# Patient Record
Sex: FEMALE | Race: WHITE | Employment: OTHER | URBAN - METROPOLITAN AREA
[De-identification: names, ages, dates, MRNs, and addresses within clinical notes are randomized per-mention and may not be internally consistent; named-entity substitution may affect disease eponyms.]

---

## 2017-09-26 PROBLEM — R42 DIZZINESS: Status: ACTIVE | Noted: 2017-09-26

## 2018-01-01 ENCOUNTER — APPOINTMENT (OUTPATIENT)
Dept: GENERAL RADIOLOGY | Age: 68
DRG: 871 | End: 2018-01-01
Payer: COMMERCIAL

## 2018-01-01 ENCOUNTER — TELEPHONE (OUTPATIENT)
Dept: INTERNAL MEDICINE CLINIC | Age: 68
End: 2018-01-01

## 2018-01-01 ENCOUNTER — APPOINTMENT (OUTPATIENT)
Dept: GENERAL RADIOLOGY | Age: 68
DRG: 393 | End: 2018-01-01
Payer: COMMERCIAL

## 2018-01-01 ENCOUNTER — OFFICE VISIT (OUTPATIENT)
Dept: INTERNAL MEDICINE CLINIC | Age: 68
End: 2018-01-01
Payer: COMMERCIAL

## 2018-01-01 ENCOUNTER — HOSPITAL ENCOUNTER (OUTPATIENT)
Dept: INFUSION THERAPY | Age: 68
Setting detail: INFUSION SERIES
Discharge: HOME | End: 2018-07-03
Payer: COMMERCIAL

## 2018-01-01 ENCOUNTER — HOSPITAL ENCOUNTER (OUTPATIENT)
Age: 68
Discharge: HOME | End: 2018-08-14
Payer: COMMERCIAL

## 2018-01-01 ENCOUNTER — APPOINTMENT (OUTPATIENT)
Dept: CT IMAGING | Age: 68
DRG: 393 | End: 2018-01-01
Payer: COMMERCIAL

## 2018-01-01 ENCOUNTER — HOSPITAL ENCOUNTER (EMERGENCY)
Age: 68
Discharge: OTHER INSTITUTION | End: 2018-10-21
Attending: EMERGENCY MEDICINE
Payer: COMMERCIAL

## 2018-01-01 ENCOUNTER — HOSPITAL ENCOUNTER (OUTPATIENT)
Age: 68
Discharge: HOME | End: 2018-10-01
Payer: COMMERCIAL

## 2018-01-01 ENCOUNTER — HOSPITAL ENCOUNTER (INPATIENT)
Age: 68
LOS: 3 days | Discharge: HOME HEALTH | DRG: 871 | End: 2018-12-18
Attending: EMERGENCY MEDICINE | Admitting: FAMILY MEDICINE
Payer: COMMERCIAL

## 2018-01-01 ENCOUNTER — APPOINTMENT (OUTPATIENT)
Dept: CT IMAGING | Age: 68
DRG: 871 | End: 2018-01-01
Payer: COMMERCIAL

## 2018-01-01 ENCOUNTER — TELEPHONE (OUTPATIENT)
Dept: FAMILY MEDICINE CLINIC | Age: 68
End: 2018-01-01

## 2018-01-01 ENCOUNTER — APPOINTMENT (OUTPATIENT)
Dept: CT IMAGING | Age: 68
End: 2018-01-01
Payer: COMMERCIAL

## 2018-01-01 ENCOUNTER — TELEPHONE (OUTPATIENT)
Dept: PHYSICAL MEDICINE AND REHAB | Age: 68
End: 2018-01-01

## 2018-01-01 ENCOUNTER — HOSPITAL ENCOUNTER (OUTPATIENT)
Dept: MAMMOGRAPHY | Age: 68
Discharge: HOME | End: 2018-05-27
Payer: COMMERCIAL

## 2018-01-01 ENCOUNTER — HOSPITAL ENCOUNTER (INPATIENT)
Age: 68
LOS: 6 days | Discharge: NURSING, REHAB OR LONG-TERM CARE | DRG: 393 | End: 2018-09-14
Attending: EMERGENCY MEDICINE | Admitting: FAMILY MEDICINE
Payer: COMMERCIAL

## 2018-01-01 ENCOUNTER — HOSPITAL ENCOUNTER (OUTPATIENT)
Age: 68
Discharge: HOME | End: 2018-04-10
Payer: COMMERCIAL

## 2018-01-01 ENCOUNTER — HOSPITAL ENCOUNTER (OUTPATIENT)
Dept: CT IMAGING | Age: 68
Discharge: HOME | End: 2018-03-16
Payer: COMMERCIAL

## 2018-01-01 ENCOUNTER — HOSPITAL ENCOUNTER (EMERGENCY)
Age: 68
Discharge: HOME | End: 2018-05-18
Attending: EMERGENCY MEDICINE
Payer: COMMERCIAL

## 2018-01-01 VITALS
OXYGEN SATURATION: 96 % | TEMPERATURE: 98.9 F | HEART RATE: 96 BPM | WEIGHT: 150 LBS | RESPIRATION RATE: 14 BRPM | BODY MASS INDEX: 24.99 KG/M2 | SYSTOLIC BLOOD PRESSURE: 118 MMHG | DIASTOLIC BLOOD PRESSURE: 61 MMHG | HEIGHT: 65 IN

## 2018-01-01 VITALS
WEIGHT: 144 LBS | OXYGEN SATURATION: 97 % | RESPIRATION RATE: 22 BRPM | DIASTOLIC BLOOD PRESSURE: 82 MMHG | TEMPERATURE: 98.6 F | HEART RATE: 107 BPM | BODY MASS INDEX: 23.99 KG/M2 | SYSTOLIC BLOOD PRESSURE: 124 MMHG | HEIGHT: 65 IN

## 2018-01-01 VITALS
SYSTOLIC BLOOD PRESSURE: 122 MMHG | HEIGHT: 64 IN | BODY MASS INDEX: 26.05 KG/M2 | DIASTOLIC BLOOD PRESSURE: 66 MMHG | WEIGHT: 152.6 LBS | RESPIRATION RATE: 18 BRPM | OXYGEN SATURATION: 98 % | HEART RATE: 114 BPM | TEMPERATURE: 98.2 F

## 2018-01-01 VITALS
OXYGEN SATURATION: 96 % | BODY MASS INDEX: 22.34 KG/M2 | HEART RATE: 119 BPM | TEMPERATURE: 98 F | HEIGHT: 66 IN | DIASTOLIC BLOOD PRESSURE: 67 MMHG | SYSTOLIC BLOOD PRESSURE: 123 MMHG | WEIGHT: 139 LBS

## 2018-01-01 VITALS
TEMPERATURE: 98 F | SYSTOLIC BLOOD PRESSURE: 125 MMHG | HEART RATE: 119 BPM | BODY MASS INDEX: 25.2 KG/M2 | DIASTOLIC BLOOD PRESSURE: 76 MMHG | WEIGHT: 156.8 LBS | OXYGEN SATURATION: 94 % | HEIGHT: 66 IN

## 2018-01-01 VITALS
SYSTOLIC BLOOD PRESSURE: 122 MMHG | DIASTOLIC BLOOD PRESSURE: 63 MMHG | HEART RATE: 98 BPM | RESPIRATION RATE: 100 BRPM | BODY MASS INDEX: 20.57 KG/M2 | TEMPERATURE: 98.9 F | WEIGHT: 128 LBS | HEIGHT: 66 IN

## 2018-01-01 VITALS
OXYGEN SATURATION: 93 % | WEIGHT: 148.15 LBS | BODY MASS INDEX: 24.68 KG/M2 | HEART RATE: 102 BPM | HEIGHT: 65 IN | RESPIRATION RATE: 16 BRPM | DIASTOLIC BLOOD PRESSURE: 66 MMHG | SYSTOLIC BLOOD PRESSURE: 146 MMHG | TEMPERATURE: 98.4 F

## 2018-01-01 VITALS
OXYGEN SATURATION: 92 % | HEIGHT: 66 IN | DIASTOLIC BLOOD PRESSURE: 60 MMHG | RESPIRATION RATE: 18 BRPM | BODY MASS INDEX: 17.57 KG/M2 | TEMPERATURE: 98.7 F | SYSTOLIC BLOOD PRESSURE: 121 MMHG | HEART RATE: 102 BPM | WEIGHT: 109.31 LBS

## 2018-01-01 VITALS
DIASTOLIC BLOOD PRESSURE: 78 MMHG | RESPIRATION RATE: 18 BRPM | OXYGEN SATURATION: 100 % | TEMPERATURE: 98.6 F | HEART RATE: 80 BPM | SYSTOLIC BLOOD PRESSURE: 109 MMHG

## 2018-01-01 DIAGNOSIS — K56.699 COLON STRICTURE (HCC): ICD-10-CM

## 2018-01-01 DIAGNOSIS — M79.89 PAIN AND SWELLING OF RIGHT LOWER LEG: ICD-10-CM

## 2018-01-01 DIAGNOSIS — R63.4 WEIGHT LOSS, UNINTENTIONAL: ICD-10-CM

## 2018-01-01 DIAGNOSIS — J96.11 CHRONIC RESPIRATORY FAILURE WITH HYPOXIA, ON HOME O2 THERAPY (HCC): Primary | ICD-10-CM

## 2018-01-01 DIAGNOSIS — I50.32 CHRONIC DIASTOLIC (CONGESTIVE) HEART FAILURE (HCC): ICD-10-CM

## 2018-01-01 DIAGNOSIS — R11.2 NON-INTRACTABLE VOMITING WITH NAUSEA, UNSPECIFIED VOMITING TYPE: ICD-10-CM

## 2018-01-01 DIAGNOSIS — Z13.820 OSTEOPOROSIS SCREENING: ICD-10-CM

## 2018-01-01 DIAGNOSIS — K55.059 ACUTE INTESTINAL ISCHEMIA (HCC): Primary | ICD-10-CM

## 2018-01-01 DIAGNOSIS — R91.8 LUNG NODULES: Primary | ICD-10-CM

## 2018-01-01 DIAGNOSIS — M79.661 PAIN AND SWELLING OF RIGHT LOWER LEG: ICD-10-CM

## 2018-01-01 DIAGNOSIS — E11.9 DIABETES MELLITUS TYPE 2, NONINSULIN DEPENDENT (HCC): ICD-10-CM

## 2018-01-01 DIAGNOSIS — Z78.9 IMPAIRED MOBILITY AND ACTIVITIES OF DAILY LIVING: ICD-10-CM

## 2018-01-01 DIAGNOSIS — E78.5 HYPERLIPIDEMIA, UNSPECIFIED HYPERLIPIDEMIA TYPE: ICD-10-CM

## 2018-01-01 DIAGNOSIS — R63.0 DECREASED APPETITE: ICD-10-CM

## 2018-01-01 DIAGNOSIS — M85.859 OTHER SPECIFIED DISORDERS OF BONE DENSITY AND STRUCTURE, UNSPECIFIED THIGH: ICD-10-CM

## 2018-01-01 DIAGNOSIS — J42 CHRONIC BRONCHITIS, UNSPECIFIED CHRONIC BRONCHITIS TYPE (HCC): ICD-10-CM

## 2018-01-01 DIAGNOSIS — Z99.81 CHRONIC RESPIRATORY FAILURE WITH HYPOXIA, ON HOME O2 THERAPY (HCC): Primary | ICD-10-CM

## 2018-01-01 DIAGNOSIS — A41.9 SEPSIS, DUE TO UNSPECIFIED ORGANISM: Primary | ICD-10-CM

## 2018-01-01 DIAGNOSIS — D64.9 ANEMIA, UNSPECIFIED TYPE: ICD-10-CM

## 2018-01-01 DIAGNOSIS — Z99.81 CHRONIC RESPIRATORY FAILURE WITH HYPOXIA, ON HOME OXYGEN THERAPY (HCC): Primary | ICD-10-CM

## 2018-01-01 DIAGNOSIS — I99.8 VASCULAR CALCIFICATION: ICD-10-CM

## 2018-01-01 DIAGNOSIS — Z71.89 MEDICATION CARE PLAN DISCUSSED WITH PATIENT: ICD-10-CM

## 2018-01-01 DIAGNOSIS — R11.0 NAUSEA: ICD-10-CM

## 2018-01-01 DIAGNOSIS — G25.81 RESTLESS LEGS SYNDROME: ICD-10-CM

## 2018-01-01 DIAGNOSIS — E87.20 LACTIC ACIDOSIS: ICD-10-CM

## 2018-01-01 DIAGNOSIS — M81.0 AGE-RELATED OSTEOPOROSIS WITHOUT CURRENT PATHOLOGICAL FRACTURE: Primary | ICD-10-CM

## 2018-01-01 DIAGNOSIS — J96.11 CHRONIC RESPIRATORY FAILURE WITH HYPOXIA (HCC): ICD-10-CM

## 2018-01-01 DIAGNOSIS — R13.14 PHARYNGOESOPHAGEAL DYSPHAGIA: Primary | ICD-10-CM

## 2018-01-01 DIAGNOSIS — K62.5 RECTAL BLEEDING: ICD-10-CM

## 2018-01-01 DIAGNOSIS — R60.0 BILATERAL EDEMA OF LOWER EXTREMITY: ICD-10-CM

## 2018-01-01 DIAGNOSIS — K21.9 GASTROESOPHAGEAL REFLUX DISEASE, ESOPHAGITIS PRESENCE NOT SPECIFIED: Primary | Chronic | ICD-10-CM

## 2018-01-01 DIAGNOSIS — Z12.39 BREAST CANCER SCREENING: ICD-10-CM

## 2018-01-01 DIAGNOSIS — E78.2 MIXED HYPERLIPIDEMIA: ICD-10-CM

## 2018-01-01 DIAGNOSIS — I50.32 CHRONIC DIASTOLIC HEART FAILURE (HCC): ICD-10-CM

## 2018-01-01 DIAGNOSIS — R91.8 LUNG NODULES: ICD-10-CM

## 2018-01-01 DIAGNOSIS — M81.0 AGE-RELATED OSTEOPOROSIS WITHOUT CURRENT PATHOLOGICAL FRACTURE: ICD-10-CM

## 2018-01-01 DIAGNOSIS — R10.10 PAIN OF UPPER ABDOMEN: ICD-10-CM

## 2018-01-01 DIAGNOSIS — R91.1 LUNG NODULE: ICD-10-CM

## 2018-01-01 DIAGNOSIS — J42 CHRONIC BRONCHITIS, UNSPECIFIED CHRONIC BRONCHITIS TYPE (HCC): Primary | ICD-10-CM

## 2018-01-01 DIAGNOSIS — R91.8 MULTIPLE LUNG NODULES ON CT: ICD-10-CM

## 2018-01-01 DIAGNOSIS — Z23 NEED FOR PNEUMOCOCCAL VACCINATION: ICD-10-CM

## 2018-01-01 DIAGNOSIS — J96.11 CHRONIC RESPIRATORY FAILURE WITH HYPOXIA, ON HOME OXYGEN THERAPY (HCC): Primary | ICD-10-CM

## 2018-01-01 DIAGNOSIS — F17.218 CIGARETTE NICOTINE DEPENDENCE WITH OTHER NICOTINE-INDUCED DISORDER: ICD-10-CM

## 2018-01-01 DIAGNOSIS — J44.9 CHRONIC OBSTRUCTIVE PULMONARY DISEASE, UNSPECIFIED COPD TYPE (HCC): ICD-10-CM

## 2018-01-01 DIAGNOSIS — R34 OLIGOURIA: ICD-10-CM

## 2018-01-01 DIAGNOSIS — M85.859 OSTEOPENIA OF HIP: ICD-10-CM

## 2018-01-01 DIAGNOSIS — R19.7 DIARRHEA, UNSPECIFIED TYPE: ICD-10-CM

## 2018-01-01 DIAGNOSIS — Z09 HOSPITAL DISCHARGE FOLLOW-UP: Primary | ICD-10-CM

## 2018-01-01 DIAGNOSIS — I10 ESSENTIAL HYPERTENSION: ICD-10-CM

## 2018-01-01 DIAGNOSIS — J44.9 COPD WITH HYPOXIA (HCC): ICD-10-CM

## 2018-01-01 DIAGNOSIS — D50.9 IRON DEFICIENCY ANEMIA, UNSPECIFIED IRON DEFICIENCY ANEMIA TYPE: ICD-10-CM

## 2018-01-01 DIAGNOSIS — R10.13 EPIGASTRIC PAIN: Primary | ICD-10-CM

## 2018-01-01 DIAGNOSIS — J84.9 INTERSTITIAL LUNG DISEASE (HCC): ICD-10-CM

## 2018-01-01 DIAGNOSIS — J90 PLEURAL EFFUSION: Primary | ICD-10-CM

## 2018-01-01 DIAGNOSIS — Z74.09 IMPAIRED MOBILITY AND ACTIVITIES OF DAILY LIVING: ICD-10-CM

## 2018-01-01 DIAGNOSIS — J18.9 PNEUMONIA OF BOTH LOWER LOBES DUE TO INFECTIOUS ORGANISM: ICD-10-CM

## 2018-01-01 DIAGNOSIS — R09.02 COPD WITH HYPOXIA (HCC): ICD-10-CM

## 2018-01-01 DIAGNOSIS — R10.32 LEFT LOWER QUADRANT PAIN: ICD-10-CM

## 2018-01-01 DIAGNOSIS — D72.829 LEUKOCYTOSIS, UNSPECIFIED TYPE: ICD-10-CM

## 2018-01-01 DIAGNOSIS — J84.89 INTERSTITIAL PNEUMONITIS (HCC): ICD-10-CM

## 2018-01-01 DIAGNOSIS — K21.9 GASTROESOPHAGEAL REFLUX DISEASE, ESOPHAGITIS PRESENCE NOT SPECIFIED: ICD-10-CM

## 2018-01-01 DIAGNOSIS — Z87.19 HISTORY OF ISCHEMIC COLITIS: ICD-10-CM

## 2018-01-01 DIAGNOSIS — E78.1 HYPERTRIGLYCERIDEMIA: ICD-10-CM

## 2018-01-01 DIAGNOSIS — E53.8 FOLATE DEFICIENCY: ICD-10-CM

## 2018-01-01 DIAGNOSIS — Z71.2 ENCOUNTER TO DISCUSS TEST RESULTS: ICD-10-CM

## 2018-01-01 DIAGNOSIS — E11.49 OTHER DIABETIC NEUROLOGICAL COMPLICATION ASSOCIATED WITH TYPE 2 DIABETES MELLITUS (HCC): ICD-10-CM

## 2018-01-01 DIAGNOSIS — F32.A DEPRESSION, UNSPECIFIED DEPRESSION TYPE: Chronic | ICD-10-CM

## 2018-01-01 DIAGNOSIS — K55.069 OCCLUSION OF SUPERIOR MESENTERIC ARTERY (HCC): Primary | ICD-10-CM

## 2018-01-01 DIAGNOSIS — E55.9 VITAMIN D DEFICIENCY: ICD-10-CM

## 2018-01-01 DIAGNOSIS — R26.2 DIFFICULTY WALKING: ICD-10-CM

## 2018-01-01 LAB
ABO/RH: NORMAL
ALBUMIN SERPL-MCNC: 1.8 G/DL (ref 3.5–5.2)
ALBUMIN SERPL-MCNC: 2 G/DL (ref 3.5–5.2)
ALBUMIN SERPL-MCNC: 2.1 G/DL (ref 3.5–5.2)
ALBUMIN SERPL-MCNC: 2.3 G/DL (ref 3.5–5.2)
ALBUMIN SERPL-MCNC: 2.4 G/DL (ref 3.5–5.2)
ALBUMIN SERPL-MCNC: 2.5 G/DL (ref 3.5–5.2)
ALBUMIN SERPL-MCNC: 2.5 G/DL (ref 3.5–5.2)
ALBUMIN SERPL-MCNC: 2.6 G/DL (ref 3.5–5.2)
ALBUMIN SERPL-MCNC: 2.7 G/DL (ref 3.5–5.2)
ALBUMIN SERPL-MCNC: 2.7 G/DL (ref 3.5–5.2)
ALBUMIN SERPL-MCNC: 3.4 G/DL (ref 3.5–5.2)
ALBUMIN SERPL-MCNC: 3.4 G/DL (ref 3.5–5.2)
ALBUMIN SERPL-MCNC: 3.6 G/DL (ref 3.5–5.2)
ALBUMIN SERPL-MCNC: 3.6 G/DL (ref 3.5–5.2)
ALBUMIN SERPL-MCNC: 3.8 G/DL (ref 3.5–5.2)
ALP BLD-CCNC: 105 U/L (ref 35–104)
ALP BLD-CCNC: 111 U/L (ref 35–104)
ALP BLD-CCNC: 111 U/L (ref 35–104)
ALP BLD-CCNC: 112 U/L (ref 35–104)
ALP BLD-CCNC: 112 U/L (ref 35–104)
ALP BLD-CCNC: 116 U/L (ref 35–104)
ALP BLD-CCNC: 122 U/L (ref 35–104)
ALP BLD-CCNC: 77 U/L (ref 35–104)
ALP BLD-CCNC: 83 U/L (ref 35–104)
ALP BLD-CCNC: 83 U/L (ref 35–104)
ALP BLD-CCNC: 84 U/L (ref 35–104)
ALP BLD-CCNC: 84 U/L (ref 35–104)
ALP BLD-CCNC: 89 U/L (ref 35–104)
ALP BLD-CCNC: 91 U/L (ref 35–104)
ALP BLD-CCNC: 98 U/L (ref 35–104)
ALT SERPL-CCNC: 10 U/L (ref 0–32)
ALT SERPL-CCNC: 14 U/L (ref 0–32)
ALT SERPL-CCNC: 15 U/L (ref 0–32)
ALT SERPL-CCNC: 15 U/L (ref 0–32)
ALT SERPL-CCNC: 16 U/L (ref 0–32)
ALT SERPL-CCNC: 16 U/L (ref 0–32)
ALT SERPL-CCNC: 5 U/L (ref 0–32)
ALT SERPL-CCNC: 6 U/L (ref 0–32)
ALT SERPL-CCNC: 7 U/L (ref 0–32)
ALT SERPL-CCNC: 8 U/L (ref 0–32)
ALT SERPL-CCNC: 9 U/L (ref 0–32)
ALT SERPL-CCNC: 9 U/L (ref 0–32)
ANION GAP SERPL CALCULATED.3IONS-SCNC: 10 MMOL/L (ref 7–16)
ANION GAP SERPL CALCULATED.3IONS-SCNC: 11 MMOL/L (ref 7–16)
ANION GAP SERPL CALCULATED.3IONS-SCNC: 12 MMOL/L (ref 7–16)
ANION GAP SERPL CALCULATED.3IONS-SCNC: 12 MMOL/L (ref 7–16)
ANION GAP SERPL CALCULATED.3IONS-SCNC: 13 MMOL/L (ref 7–16)
ANION GAP SERPL CALCULATED.3IONS-SCNC: 14 MMOL/L (ref 7–16)
ANION GAP SERPL CALCULATED.3IONS-SCNC: 16 MMOL/L (ref 7–16)
ANION GAP SERPL CALCULATED.3IONS-SCNC: 9 MMOL/L (ref 7–16)
ANISOCYTOSIS: ABNORMAL
ANISOCYTOSIS: ABNORMAL
ANTIBODY SCREEN: NORMAL
AST SERPL-CCNC: 10 U/L (ref 0–31)
AST SERPL-CCNC: 11 U/L (ref 0–31)
AST SERPL-CCNC: 12 U/L (ref 0–31)
AST SERPL-CCNC: 14 U/L (ref 0–31)
AST SERPL-CCNC: 14 U/L (ref 0–31)
AST SERPL-CCNC: 15 U/L (ref 0–31)
AST SERPL-CCNC: 15 U/L (ref 0–31)
AST SERPL-CCNC: 18 U/L (ref 0–31)
AST SERPL-CCNC: 18 U/L (ref 0–31)
AST SERPL-CCNC: 19 U/L (ref 0–31)
AST SERPL-CCNC: 8 U/L (ref 0–31)
AST SERPL-CCNC: 8 U/L (ref 0–31)
B.E.: 4.4 MMOL/L (ref -3–3)
BACTERIA: ABNORMAL /HPF
BACTERIA: NORMAL /HPF
BASOPHILS ABSOLUTE: 0 E9/L (ref 0–0.2)
BASOPHILS ABSOLUTE: 0.02 E9/L (ref 0–0.2)
BASOPHILS ABSOLUTE: 0.03 E9/L (ref 0–0.2)
BASOPHILS ABSOLUTE: 0.04 E9/L (ref 0–0.2)
BASOPHILS ABSOLUTE: 0.05 E9/L (ref 0–0.2)
BASOPHILS ABSOLUTE: 0.05 E9/L (ref 0–0.2)
BASOPHILS RELATIVE PERCENT: 0.1 % (ref 0–2)
BASOPHILS RELATIVE PERCENT: 0.1 % (ref 0–2)
BASOPHILS RELATIVE PERCENT: 0.2 % (ref 0–2)
BASOPHILS RELATIVE PERCENT: 0.3 % (ref 0–2)
BASOPHILS RELATIVE PERCENT: 0.4 % (ref 0–2)
BASOPHILS RELATIVE PERCENT: 0.4 % (ref 0–2)
BASOPHILS RELATIVE PERCENT: 0.5 % (ref 0–2)
BASOPHILS RELATIVE PERCENT: 0.5 % (ref 0–2)
BILIRUB SERPL-MCNC: 0.2 MG/DL (ref 0–1.2)
BILIRUB SERPL-MCNC: 0.3 MG/DL (ref 0–1.2)
BILIRUB SERPL-MCNC: 0.4 MG/DL (ref 0–1.2)
BILIRUB SERPL-MCNC: <0.2 MG/DL (ref 0–1.2)
BILIRUBIN URINE: NEGATIVE
BLOOD BANK DISPENSE STATUS: NORMAL
BLOOD BANK DISPENSE STATUS: NORMAL
BLOOD BANK PRODUCT CODE: NORMAL
BLOOD BANK PRODUCT CODE: NORMAL
BLOOD CULTURE, ROUTINE: NORMAL
BLOOD, URINE: NEGATIVE
BPU ID: NORMAL
BPU ID: NORMAL
BUN BLDV-MCNC: 10 MG/DL (ref 8–23)
BUN BLDV-MCNC: 10 MG/DL (ref 8–23)
BUN BLDV-MCNC: 11 MG/DL (ref 8–23)
BUN BLDV-MCNC: 11 MG/DL (ref 8–23)
BUN BLDV-MCNC: 12 MG/DL (ref 8–23)
BUN BLDV-MCNC: 14 MG/DL (ref 8–23)
BUN BLDV-MCNC: 14 MG/DL (ref 8–23)
BUN BLDV-MCNC: 20 MG/DL (ref 8–23)
BUN BLDV-MCNC: 3 MG/DL (ref 8–23)
BUN BLDV-MCNC: 5 MG/DL (ref 8–23)
BUN BLDV-MCNC: 6 MG/DL (ref 8–23)
BUN BLDV-MCNC: 6 MG/DL (ref 8–23)
BUN BLDV-MCNC: 7 MG/DL (ref 8–23)
BUN BLDV-MCNC: 7 MG/DL (ref 8–23)
BUN BLDV-MCNC: 8 MG/DL (ref 8–23)
BURR CELLS: ABNORMAL
C DIFFICILE TOXIN, EIA: NORMAL
CALCIUM SERPL-MCNC: 7.3 MG/DL (ref 8.6–10.2)
CALCIUM SERPL-MCNC: 7.4 MG/DL (ref 8.6–10.2)
CALCIUM SERPL-MCNC: 7.5 MG/DL (ref 8.6–10.2)
CALCIUM SERPL-MCNC: 7.6 MG/DL (ref 8.6–10.2)
CALCIUM SERPL-MCNC: 7.7 MG/DL (ref 8.6–10.2)
CALCIUM SERPL-MCNC: 7.7 MG/DL (ref 8.6–10.2)
CALCIUM SERPL-MCNC: 7.8 MG/DL (ref 8.6–10.2)
CALCIUM SERPL-MCNC: 7.8 MG/DL (ref 8.6–10.2)
CALCIUM SERPL-MCNC: 8.2 MG/DL (ref 8.6–10.2)
CALCIUM SERPL-MCNC: 8.3 MG/DL (ref 8.6–10.2)
CALCIUM SERPL-MCNC: 8.4 MG/DL (ref 8.6–10.2)
CALCIUM SERPL-MCNC: 8.4 MG/DL (ref 8.6–10.2)
CALCIUM SERPL-MCNC: 8.7 MG/DL (ref 8.6–10.2)
CALCIUM SERPL-MCNC: 9 MG/DL (ref 8.6–10.2)
CALCIUM SERPL-MCNC: 9.1 MG/DL (ref 8.6–10.2)
CALCIUM SERPL-MCNC: 9.3 MG/DL (ref 8.6–10.2)
CHLORIDE BLD-SCNC: 100 MMOL/L (ref 98–107)
CHLORIDE BLD-SCNC: 100 MMOL/L (ref 98–107)
CHLORIDE BLD-SCNC: 101 MMOL/L (ref 98–107)
CHLORIDE BLD-SCNC: 106 MMOL/L (ref 98–107)
CHLORIDE BLD-SCNC: 109 MMOL/L (ref 98–107)
CHLORIDE BLD-SCNC: 109 MMOL/L (ref 98–107)
CHLORIDE BLD-SCNC: 92 MMOL/L (ref 98–107)
CHLORIDE BLD-SCNC: 95 MMOL/L (ref 98–107)
CHLORIDE BLD-SCNC: 96 MMOL/L (ref 98–107)
CHLORIDE BLD-SCNC: 97 MMOL/L (ref 98–107)
CHLORIDE BLD-SCNC: 99 MMOL/L (ref 98–107)
CHOLESTEROL, TOTAL: 185 MG/DL (ref 0–199)
CHOLESTEROL, TOTAL: 92 MG/DL (ref 0–199)
CHP ED QC CHECK: NORMAL
CLARITY: ABNORMAL
CLARITY: CLEAR
CO2: 18 MMOL/L (ref 22–29)
CO2: 19 MMOL/L (ref 22–29)
CO2: 20 MMOL/L (ref 22–29)
CO2: 21 MMOL/L (ref 22–29)
CO2: 22 MMOL/L (ref 22–29)
CO2: 23 MMOL/L (ref 22–29)
CO2: 24 MMOL/L (ref 22–29)
CO2: 25 MMOL/L (ref 22–29)
CO2: 26 MMOL/L (ref 22–29)
CO2: 27 MMOL/L (ref 22–29)
CO2: 28 MMOL/L (ref 22–29)
CO2: 29 MMOL/L (ref 22–29)
CO2: 29 MMOL/L (ref 22–29)
CO2: 30 MMOL/L (ref 22–29)
CO2: 31 MMOL/L (ref 22–29)
COHB: 0.7 % (ref 0–1.5)
COLOR: YELLOW
CREAT SERPL-MCNC: 0.4 MG/DL (ref 0.5–1)
CREAT SERPL-MCNC: 0.5 MG/DL (ref 0.5–1)
CREAT SERPL-MCNC: 0.6 MG/DL (ref 0.5–1)
CREAT SERPL-MCNC: 0.7 MG/DL (ref 0.5–1)
CREAT SERPL-MCNC: 0.8 MG/DL (ref 0.5–1)
CRITICAL: ABNORMAL
CULTURE, BLOOD 2: NORMAL
CULTURE, RESPIRATORY: ABNORMAL
CULTURE, RESPIRATORY: ABNORMAL
CULTURE, STOOL: NORMAL
DATE ANALYZED: ABNORMAL
DATE OF COLLECTION: ABNORMAL
DESCRIPTION BLOOD BANK: NORMAL
DESCRIPTION BLOOD BANK: NORMAL
EKG ATRIAL RATE: 105 BPM
EKG ATRIAL RATE: 110 BPM
EKG ATRIAL RATE: 129 BPM
EKG ATRIAL RATE: 95 BPM
EKG P AXIS: 50 DEGREES
EKG P AXIS: 51 DEGREES
EKG P AXIS: 59 DEGREES
EKG P AXIS: 65 DEGREES
EKG P-R INTERVAL: 136 MS
EKG P-R INTERVAL: 140 MS
EKG P-R INTERVAL: 146 MS
EKG P-R INTERVAL: 154 MS
EKG Q-T INTERVAL: 318 MS
EKG Q-T INTERVAL: 354 MS
EKG Q-T INTERVAL: 376 MS
EKG Q-T INTERVAL: 406 MS
EKG QRS DURATION: 124 MS
EKG QRS DURATION: 126 MS
EKG QRS DURATION: 132 MS
EKG QRS DURATION: 134 MS
EKG QTC CALCULATION (BAZETT): 465 MS
EKG QTC CALCULATION (BAZETT): 467 MS
EKG QTC CALCULATION (BAZETT): 508 MS
EKG QTC CALCULATION (BAZETT): 510 MS
EKG R AXIS: 19 DEGREES
EKG R AXIS: 25 DEGREES
EKG R AXIS: 28 DEGREES
EKG R AXIS: 52 DEGREES
EKG T AXIS: 120 DEGREES
EKG T AXIS: 29 DEGREES
EKG T AXIS: 32 DEGREES
EKG T AXIS: 5 DEGREES
EKG VENTRICULAR RATE: 105 BPM
EKG VENTRICULAR RATE: 110 BPM
EKG VENTRICULAR RATE: 129 BPM
EKG VENTRICULAR RATE: 95 BPM
EOSINOPHILS ABSOLUTE: 0 E9/L (ref 0.05–0.5)
EOSINOPHILS ABSOLUTE: 0.03 E9/L (ref 0.05–0.5)
EOSINOPHILS ABSOLUTE: 0.04 E9/L (ref 0.05–0.5)
EOSINOPHILS ABSOLUTE: 0.11 E9/L (ref 0.05–0.5)
EOSINOPHILS ABSOLUTE: 0.12 E9/L (ref 0.05–0.5)
EOSINOPHILS ABSOLUTE: 0.12 E9/L (ref 0.05–0.5)
EOSINOPHILS ABSOLUTE: 0.15 E9/L (ref 0.05–0.5)
EOSINOPHILS ABSOLUTE: 0.2 E9/L (ref 0.05–0.5)
EOSINOPHILS ABSOLUTE: 0.38 E9/L (ref 0.05–0.5)
EOSINOPHILS RELATIVE PERCENT: 0 % (ref 0–6)
EOSINOPHILS RELATIVE PERCENT: 0.2 % (ref 0–6)
EOSINOPHILS RELATIVE PERCENT: 0.2 % (ref 0–6)
EOSINOPHILS RELATIVE PERCENT: 0.3 % (ref 0–6)
EOSINOPHILS RELATIVE PERCENT: 0.9 % (ref 0–6)
EOSINOPHILS RELATIVE PERCENT: 1.1 % (ref 0–6)
EOSINOPHILS RELATIVE PERCENT: 2.3 % (ref 0–6)
EOSINOPHILS RELATIVE PERCENT: 2.4 % (ref 0–6)
EPITHELIAL CELLS, UA: ABNORMAL /HPF
EPITHELIAL CELLS, UA: NORMAL /HPF
FERRITIN: 1 NG/ML
FERRITIN: 14 NG/ML
FILM ARRAY ADENOVIRUS: NORMAL
FILM ARRAY BORDETELLA PERTUSSIS: NORMAL
FILM ARRAY CHLAMYDOPHILIA PNEUMONIAE: NORMAL
FILM ARRAY CORONAVIRUS 229E: NORMAL
FILM ARRAY CORONAVIRUS HKU1: NORMAL
FILM ARRAY CORONAVIRUS NL63: NORMAL
FILM ARRAY CORONAVIRUS OC43: NORMAL
FILM ARRAY INFLUENZA A VIRUS 09H1: NORMAL
FILM ARRAY INFLUENZA A VIRUS H1: NORMAL
FILM ARRAY INFLUENZA A VIRUS H3: NORMAL
FILM ARRAY INFLUENZA A VIRUS: NORMAL
FILM ARRAY INFLUENZA B: NORMAL
FILM ARRAY METAPNEUMOVIRUS: NORMAL
FILM ARRAY MYCOPLASMA PNEUMONIAE: NORMAL
FILM ARRAY PARAINFLUENZA VIRUS 1: NORMAL
FILM ARRAY PARAINFLUENZA VIRUS 2: NORMAL
FILM ARRAY PARAINFLUENZA VIRUS 3: NORMAL
FILM ARRAY PARAINFLUENZA VIRUS 4: NORMAL
FILM ARRAY RESPIRATORY SYNCITIAL VIRUS: NORMAL
FILM ARRAY RHINOVIRUS/ENTEROVIRUS: NORMAL
FOLATE: >20 NG/ML (ref 4.8–24.2)
FOLATE: >20 NG/ML (ref 4.8–24.2)
GFR AFRICAN AMERICAN: >60
GFR NON-AFRICAN AMERICAN: >60 ML/MIN/1.73
GIARDIA ANTIGEN STOOL: NORMAL
GLUCOSE BLD-MCNC: 134 MG/DL (ref 74–109)
GLUCOSE BLD-MCNC: 135 MG/DL
GLUCOSE BLD-MCNC: 137 MG/DL (ref 74–109)
GLUCOSE BLD-MCNC: 146 MG/DL (ref 74–99)
GLUCOSE BLD-MCNC: 148 MG/DL (ref 74–109)
GLUCOSE BLD-MCNC: 151 MG/DL (ref 74–109)
GLUCOSE BLD-MCNC: 156 MG/DL (ref 74–109)
GLUCOSE BLD-MCNC: 185 MG/DL (ref 74–109)
GLUCOSE BLD-MCNC: 195 MG/DL (ref 74–109)
GLUCOSE BLD-MCNC: 214 MG/DL (ref 74–109)
GLUCOSE BLD-MCNC: 235 MG/DL (ref 74–109)
GLUCOSE BLD-MCNC: 240 MG/DL (ref 74–109)
GLUCOSE BLD-MCNC: 244 MG/DL (ref 74–109)
GLUCOSE BLD-MCNC: 71 MG/DL (ref 74–99)
GLUCOSE BLD-MCNC: 80 MG/DL (ref 74–99)
GLUCOSE BLD-MCNC: 91 MG/DL (ref 74–99)
GLUCOSE FASTING: 120 MG/DL (ref 74–109)
GLUCOSE FASTING: 167 MG/DL (ref 74–109)
GLUCOSE URINE: NEGATIVE MG/DL
HBA1C MFR BLD: 5.4 % (ref 4–5.6)
HBA1C MFR BLD: 6.2 % (ref 4–5.6)
HBA1C MFR BLD: 6.2 % (ref 4–5.6)
HBA1C MFR BLD: 6.9 %
HCO3: 28.4 MMOL/L (ref 22–26)
HCT VFR BLD CALC: 23.4 % (ref 34–48)
HCT VFR BLD CALC: 24.4 % (ref 34–48)
HCT VFR BLD CALC: 24.5 % (ref 34–48)
HCT VFR BLD CALC: 24.6 % (ref 34–48)
HCT VFR BLD CALC: 25.3 % (ref 34–48)
HCT VFR BLD CALC: 25.4 % (ref 34–48)
HCT VFR BLD CALC: 25.9 % (ref 34–48)
HCT VFR BLD CALC: 26.5 % (ref 34–48)
HCT VFR BLD CALC: 26.7 % (ref 34–48)
HCT VFR BLD CALC: 27 % (ref 34–48)
HCT VFR BLD CALC: 28.6 % (ref 34–48)
HCT VFR BLD CALC: 29.5 % (ref 34–48)
HCT VFR BLD CALC: 29.8 % (ref 34–48)
HCT VFR BLD CALC: 30.3 % (ref 34–48)
HCT VFR BLD CALC: 30.5 % (ref 34–48)
HCT VFR BLD CALC: 31.7 % (ref 34–48)
HCT VFR BLD CALC: 32 % (ref 34–48)
HCT VFR BLD CALC: 34.3 % (ref 34–48)
HDLC SERPL-MCNC: 39 MG/DL
HDLC SERPL-MCNC: 46 MG/DL
HEMOGLOBIN: 10.1 G/DL (ref 11.5–15.5)
HEMOGLOBIN: 10.5 G/DL (ref 11.5–15.5)
HEMOGLOBIN: 11.3 G/DL (ref 11.5–15.5)
HEMOGLOBIN: 7.3 G/DL (ref 11.5–15.5)
HEMOGLOBIN: 7.6 G/DL (ref 11.5–15.5)
HEMOGLOBIN: 7.8 G/DL (ref 11.5–15.5)
HEMOGLOBIN: 7.8 G/DL (ref 11.5–15.5)
HEMOGLOBIN: 7.9 G/DL (ref 11.5–15.5)
HEMOGLOBIN: 8 G/DL (ref 11.5–15.5)
HEMOGLOBIN: 8.1 G/DL (ref 11.5–15.5)
HEMOGLOBIN: 8.4 G/DL (ref 11.5–15.5)
HEMOGLOBIN: 8.8 G/DL (ref 11.5–15.5)
HEMOGLOBIN: 9 G/DL (ref 11.5–15.5)
HEMOGLOBIN: 9.2 G/DL (ref 11.5–15.5)
HEMOGLOBIN: 9.3 G/DL (ref 11.5–15.5)
HEMOGLOBIN: 9.3 G/DL (ref 11.5–15.5)
HEMOGLOBIN: 9.4 G/DL (ref 11.5–15.5)
HHB: 4.2 % (ref 0–5)
HYPOCHROMIA: ABNORMAL
IMMATURE GRANULOCYTES #: 0.07 E9/L
IMMATURE GRANULOCYTES #: 0.08 E9/L
IMMATURE GRANULOCYTES #: 0.1 E9/L
IMMATURE GRANULOCYTES #: 0.1 E9/L
IMMATURE GRANULOCYTES #: 0.11 E9/L
IMMATURE GRANULOCYTES #: 0.11 E9/L
IMMATURE GRANULOCYTES #: 0.21 E9/L
IMMATURE GRANULOCYTES #: 0.22 E9/L
IMMATURE GRANULOCYTES %: 0.5 % (ref 0–5)
IMMATURE GRANULOCYTES %: 0.7 % (ref 0–5)
IMMATURE GRANULOCYTES %: 0.8 % (ref 0–5)
IMMATURE GRANULOCYTES %: 1 % (ref 0–5)
IMMATURE GRANULOCYTES %: 1 % (ref 0–5)
IMMATURE GRANULOCYTES %: 1.1 % (ref 0–5)
IMMATURE GRANULOCYTES %: 1.4 % (ref 0–5)
IMMATURE GRANULOCYTES %: 1.6 % (ref 0–5)
IMMATURE RETIC FRACT: 32 % (ref 3–15.9)
IRON SATURATION: 4 % (ref 15–50)
IRON SATURATION: 6 % (ref 15–50)
IRON: 12 MCG/DL (ref 37–145)
IRON: 26 MCG/DL (ref 37–145)
KETONES, URINE: NEGATIVE MG/DL
LAB: ABNORMAL
LACTATE DEHYDROGENASE: 141 U/L (ref 135–214)
LACTATE DEHYDROGENASE: 207 U/L (ref 135–214)
LACTIC ACID: 1.2 MMOL/L (ref 0.5–2.2)
LACTIC ACID: 1.3 MMOL/L (ref 0.5–2.2)
LACTIC ACID: 1.9 MMOL/L (ref 0.5–2.2)
LACTIC ACID: 2.1 MMOL/L (ref 0.5–2.2)
LACTIC ACID: 2.5 MMOL/L (ref 0.5–2.2)
LACTIC ACID: 2.6 MMOL/L (ref 0.5–2.2)
LACTIC ACID: 3.6 MMOL/L (ref 0.5–2.2)
LACTIC ACID: 4.1 MMOL/L (ref 0.5–2.2)
LDL CHOLESTEROL CALCULATED: 26 MG/DL (ref 0–99)
LDL CHOLESTEROL CALCULATED: 97 MG/DL (ref 0–99)
LEUKOCYTE ESTERASE, URINE: ABNORMAL
LEUKOCYTE ESTERASE, URINE: NEGATIVE
LIPASE: 17 U/L (ref 13–60)
LIPASE: 19 U/L (ref 13–60)
LIPASE: 26 U/L (ref 13–60)
LIPASE: 32 U/L (ref 13–60)
LV EF: 73 %
LVEF MODALITY: NORMAL
LYMPHOCYTES ABSOLUTE: 0.29 E9/L (ref 1.5–4)
LYMPHOCYTES ABSOLUTE: 0.47 E9/L (ref 1.5–4)
LYMPHOCYTES ABSOLUTE: 0.7 E9/L (ref 1.5–4)
LYMPHOCYTES ABSOLUTE: 1.18 E9/L (ref 1.5–4)
LYMPHOCYTES ABSOLUTE: 1.27 E9/L (ref 1.5–4)
LYMPHOCYTES ABSOLUTE: 1.39 E9/L (ref 1.5–4)
LYMPHOCYTES ABSOLUTE: 1.49 E9/L (ref 1.5–4)
LYMPHOCYTES ABSOLUTE: 1.67 E9/L (ref 1.5–4)
LYMPHOCYTES ABSOLUTE: 1.71 E9/L (ref 1.5–4)
LYMPHOCYTES ABSOLUTE: 2.05 E9/L (ref 1.5–4)
LYMPHOCYTES ABSOLUTE: 2.3 E9/L (ref 1.5–4)
LYMPHOCYTES ABSOLUTE: 2.37 E9/L (ref 1.5–4)
LYMPHOCYTES RELATIVE PERCENT: 0.9 % (ref 20–42)
LYMPHOCYTES RELATIVE PERCENT: 1.8 % (ref 20–42)
LYMPHOCYTES RELATIVE PERCENT: 14.2 % (ref 20–42)
LYMPHOCYTES RELATIVE PERCENT: 15 % (ref 20–42)
LYMPHOCYTES RELATIVE PERCENT: 15.8 % (ref 20–42)
LYMPHOCYTES RELATIVE PERCENT: 17.8 % (ref 20–42)
LYMPHOCYTES RELATIVE PERCENT: 20.9 % (ref 20–42)
LYMPHOCYTES RELATIVE PERCENT: 23.3 % (ref 20–42)
LYMPHOCYTES RELATIVE PERCENT: 3.3 % (ref 20–42)
LYMPHOCYTES RELATIVE PERCENT: 4.3 % (ref 20–42)
LYMPHOCYTES RELATIVE PERCENT: 4.3 % (ref 20–42)
LYMPHOCYTES RELATIVE PERCENT: 8.2 % (ref 20–42)
Lab: ABNORMAL
MAGNESIUM: 0.9 MG/DL (ref 1.6–2.6)
MAGNESIUM: 1.2 MG/DL (ref 1.6–2.6)
MAGNESIUM: 1.7 MG/DL (ref 1.6–2.6)
MAGNESIUM: 1.9 MG/DL (ref 1.6–2.6)
MCH RBC QN AUTO: 25 PG (ref 26–35)
MCH RBC QN AUTO: 25.1 PG (ref 26–35)
MCH RBC QN AUTO: 25.2 PG (ref 26–35)
MCH RBC QN AUTO: 25.5 PG (ref 26–35)
MCH RBC QN AUTO: 25.6 PG (ref 26–35)
MCH RBC QN AUTO: 25.8 PG (ref 26–35)
MCH RBC QN AUTO: 25.9 PG (ref 26–35)
MCH RBC QN AUTO: 26.1 PG (ref 26–35)
MCH RBC QN AUTO: 26.2 PG (ref 26–35)
MCH RBC QN AUTO: 26.5 PG (ref 26–35)
MCH RBC QN AUTO: 27.2 PG (ref 26–35)
MCH RBC QN AUTO: 28.3 PG (ref 26–35)
MCH RBC QN AUTO: 28.8 PG (ref 26–35)
MCH RBC QN AUTO: 28.9 PG (ref 26–35)
MCH RBC QN AUTO: 29 PG (ref 26–35)
MCH RBC QN AUTO: 29.1 PG (ref 26–35)
MCH RBC QN AUTO: 29.5 PG (ref 26–35)
MCH RBC QN AUTO: 30.4 PG (ref 26–35)
MCHC RBC AUTO-ENTMCNC: 30.5 % (ref 32–34.5)
MCHC RBC AUTO-ENTMCNC: 30.7 % (ref 32–34.5)
MCHC RBC AUTO-ENTMCNC: 30.8 % (ref 32–34.5)
MCHC RBC AUTO-ENTMCNC: 30.9 % (ref 32–34.5)
MCHC RBC AUTO-ENTMCNC: 31.1 % (ref 32–34.5)
MCHC RBC AUTO-ENTMCNC: 31.2 % (ref 32–34.5)
MCHC RBC AUTO-ENTMCNC: 31.2 % (ref 32–34.5)
MCHC RBC AUTO-ENTMCNC: 31.5 % (ref 32–34.5)
MCHC RBC AUTO-ENTMCNC: 31.6 % (ref 32–34.5)
MCHC RBC AUTO-ENTMCNC: 31.7 % (ref 32–34.5)
MCHC RBC AUTO-ENTMCNC: 31.9 % (ref 32–34.5)
MCHC RBC AUTO-ENTMCNC: 32.2 % (ref 32–34.5)
MCHC RBC AUTO-ENTMCNC: 32.9 % (ref 32–34.5)
MCHC RBC AUTO-ENTMCNC: 33.1 % (ref 32–34.5)
MCV RBC AUTO: 80.4 FL (ref 80–99.9)
MCV RBC AUTO: 80.4 FL (ref 80–99.9)
MCV RBC AUTO: 80.7 FL (ref 80–99.9)
MCV RBC AUTO: 80.9 FL (ref 80–99.9)
MCV RBC AUTO: 81.1 FL (ref 80–99.9)
MCV RBC AUTO: 81.8 FL (ref 80–99.9)
MCV RBC AUTO: 83 FL (ref 80–99.9)
MCV RBC AUTO: 83.1 FL (ref 80–99.9)
MCV RBC AUTO: 84 FL (ref 80–99.9)
MCV RBC AUTO: 84.2 FL (ref 80–99.9)
MCV RBC AUTO: 86.1 FL (ref 80–99.9)
MCV RBC AUTO: 89.6 FL (ref 80–99.9)
MCV RBC AUTO: 90.7 FL (ref 80–99.9)
MCV RBC AUTO: 91.4 FL (ref 80–99.9)
MCV RBC AUTO: 91.4 FL (ref 80–99.9)
MCV RBC AUTO: 91.9 FL (ref 80–99.9)
MCV RBC AUTO: 92.4 FL (ref 80–99.9)
MCV RBC AUTO: 93.2 FL (ref 80–99.9)
METER GLUCOSE: 106 MG/DL (ref 74–99)
METER GLUCOSE: 106 MG/DL (ref 74–99)
METER GLUCOSE: 113 MG/DL (ref 74–99)
METER GLUCOSE: 117 MG/DL (ref 74–99)
METER GLUCOSE: 119 MG/DL (ref 70–110)
METER GLUCOSE: 120 MG/DL (ref 70–110)
METER GLUCOSE: 121 MG/DL (ref 70–110)
METER GLUCOSE: 128 MG/DL (ref 74–99)
METER GLUCOSE: 137 MG/DL (ref 70–110)
METER GLUCOSE: 137 MG/DL (ref 74–99)
METER GLUCOSE: 137 MG/DL (ref 74–99)
METER GLUCOSE: 152 MG/DL (ref 70–110)
METER GLUCOSE: 153 MG/DL (ref 70–110)
METER GLUCOSE: 155 MG/DL (ref 74–99)
METER GLUCOSE: 156 MG/DL (ref 70–110)
METER GLUCOSE: 157 MG/DL (ref 70–110)
METER GLUCOSE: 162 MG/DL (ref 70–110)
METER GLUCOSE: 183 MG/DL (ref 70–110)
METER GLUCOSE: 185 MG/DL (ref 70–110)
METER GLUCOSE: 185 MG/DL (ref 74–99)
METER GLUCOSE: 188 MG/DL (ref 70–110)
METER GLUCOSE: 190 MG/DL (ref 70–110)
METER GLUCOSE: 194 MG/DL (ref 70–110)
METER GLUCOSE: 206 MG/DL (ref 70–110)
METER GLUCOSE: 215 MG/DL (ref 70–110)
METER GLUCOSE: 224 MG/DL (ref 70–110)
METER GLUCOSE: 232 MG/DL (ref 70–110)
METER GLUCOSE: 239 MG/DL (ref 70–110)
METER GLUCOSE: 254 MG/DL (ref 70–110)
METER GLUCOSE: 259 MG/DL (ref 70–110)
METER GLUCOSE: 273 MG/DL (ref 70–110)
METER GLUCOSE: 282 MG/DL (ref 70–110)
METER GLUCOSE: 284 MG/DL (ref 70–110)
METER GLUCOSE: 286 MG/DL (ref 70–110)
METER GLUCOSE: 301 MG/DL (ref 70–110)
METER GLUCOSE: 81 MG/DL (ref 74–99)
METER GLUCOSE: 85 MG/DL (ref 74–99)
METER GLUCOSE: 94 MG/DL (ref 74–99)
METER GLUCOSE: 94 MG/DL (ref 74–99)
METER GLUCOSE: 98 MG/DL (ref 74–99)
METHB: 0.3 % (ref 0–1.5)
MODE: ABNORMAL
MONOCYTES ABSOLUTE: 0.29 E9/L (ref 0.1–0.95)
MONOCYTES ABSOLUTE: 0.31 E9/L (ref 0.1–0.95)
MONOCYTES ABSOLUTE: 0.38 E9/L (ref 0.1–0.95)
MONOCYTES ABSOLUTE: 0.42 E9/L (ref 0.1–0.95)
MONOCYTES ABSOLUTE: 0.61 E9/L (ref 0.1–0.95)
MONOCYTES ABSOLUTE: 0.69 E9/L (ref 0.1–0.95)
MONOCYTES ABSOLUTE: 0.7 E9/L (ref 0.1–0.95)
MONOCYTES ABSOLUTE: 0.74 E9/L (ref 0.1–0.95)
MONOCYTES ABSOLUTE: 0.88 E9/L (ref 0.1–0.95)
MONOCYTES ABSOLUTE: 0.9 E9/L (ref 0.1–0.95)
MONOCYTES ABSOLUTE: 1 E9/L (ref 0.1–0.95)
MONOCYTES ABSOLUTE: 1.25 E9/L (ref 0.1–0.95)
MONOCYTES RELATIVE PERCENT: 0.9 % (ref 2–12)
MONOCYTES RELATIVE PERCENT: 2.6 % (ref 2–12)
MONOCYTES RELATIVE PERCENT: 2.6 % (ref 2–12)
MONOCYTES RELATIVE PERCENT: 2.7 % (ref 2–12)
MONOCYTES RELATIVE PERCENT: 4.3 % (ref 2–12)
MONOCYTES RELATIVE PERCENT: 4.6 % (ref 2–12)
MONOCYTES RELATIVE PERCENT: 4.6 % (ref 2–12)
MONOCYTES RELATIVE PERCENT: 4.8 % (ref 2–12)
MONOCYTES RELATIVE PERCENT: 4.8 % (ref 2–12)
MONOCYTES RELATIVE PERCENT: 4.9 % (ref 2–12)
MONOCYTES RELATIVE PERCENT: 5.3 % (ref 2–12)
MONOCYTES RELATIVE PERCENT: 5.9 % (ref 2–12)
MRSA CULTURE ONLY: NORMAL
NEUTROPHILS ABSOLUTE: 10.01 E9/L (ref 1.8–7.3)
NEUTROPHILS ABSOLUTE: 10.06 E9/L (ref 1.8–7.3)
NEUTROPHILS ABSOLUTE: 11.97 E9/L (ref 1.8–7.3)
NEUTROPHILS ABSOLUTE: 13.17 E9/L (ref 1.8–7.3)
NEUTROPHILS ABSOLUTE: 18.98 E9/L (ref 1.8–7.3)
NEUTROPHILS ABSOLUTE: 22.56 E9/L (ref 1.8–7.3)
NEUTROPHILS ABSOLUTE: 27.93 E9/L (ref 1.8–7.3)
NEUTROPHILS ABSOLUTE: 28.52 E9/L (ref 1.8–7.3)
NEUTROPHILS ABSOLUTE: 38.36 E9/L (ref 1.8–7.3)
NEUTROPHILS ABSOLUTE: 4.31 E9/L (ref 1.8–7.3)
NEUTROPHILS ABSOLUTE: 5.78 E9/L (ref 1.8–7.3)
NEUTROPHILS ABSOLUTE: 7.76 E9/L (ref 1.8–7.3)
NEUTROPHILS RELATIVE PERCENT: 67.4 % (ref 43–80)
NEUTROPHILS RELATIVE PERCENT: 70.6 % (ref 43–80)
NEUTROPHILS RELATIVE PERCENT: 75.5 % (ref 43–80)
NEUTROPHILS RELATIVE PERCENT: 77.1 % (ref 43–80)
NEUTROPHILS RELATIVE PERCENT: 78 % (ref 43–80)
NEUTROPHILS RELATIVE PERCENT: 79.1 % (ref 43–80)
NEUTROPHILS RELATIVE PERCENT: 85.1 % (ref 43–80)
NEUTROPHILS RELATIVE PERCENT: 90.8 % (ref 43–80)
NEUTROPHILS RELATIVE PERCENT: 92.2 % (ref 43–80)
NEUTROPHILS RELATIVE PERCENT: 94.8 % (ref 43–80)
NEUTROPHILS RELATIVE PERCENT: 95.6 % (ref 43–80)
NEUTROPHILS RELATIVE PERCENT: 96.5 % (ref 43–80)
NITRITE, URINE: NEGATIVE
NITRITE, URINE: POSITIVE
O2 CONTENT: 13.3 ML/DL
O2 SATURATION: 95.8 % (ref 92–98.5)
O2HB: 94.8 % (ref 94–97)
OCCULT BLOOD SCREENING: NORMAL
OPERATOR ID: 208
ORGANISM: ABNORMAL
OVALOCYTES: ABNORMAL
PATHOLOGIST REVIEW: NORMAL
PATIENT TEMP: 37 C
PCO2: 40.2 MMHG (ref 35–45)
PDW BLD-RTO: 14 FL (ref 11.5–15)
PDW BLD-RTO: 14.5 FL (ref 11.5–15)
PDW BLD-RTO: 14.6 FL (ref 11.5–15)
PDW BLD-RTO: 15 FL (ref 11.5–15)
PDW BLD-RTO: 15.1 FL (ref 11.5–15)
PDW BLD-RTO: 15.1 FL (ref 11.5–15)
PDW BLD-RTO: 15.3 FL (ref 11.5–15)
PDW BLD-RTO: 15.4 FL (ref 11.5–15)
PDW BLD-RTO: 15.5 FL (ref 11.5–15)
PDW BLD-RTO: 15.5 FL (ref 11.5–15)
PDW BLD-RTO: 15.6 FL (ref 11.5–15)
PDW BLD-RTO: 15.7 FL (ref 11.5–15)
PDW BLD-RTO: 15.7 FL (ref 11.5–15)
PDW BLD-RTO: 16.3 FL (ref 11.5–15)
PDW BLD-RTO: 18 FL (ref 11.5–15)
PDW BLD-RTO: 22.5 FL (ref 11.5–15)
PH BLOOD GAS: 7.47 (ref 7.35–7.45)
PH UA: 5 (ref 5–9)
PH UA: 6 (ref 5–9)
PH UA: 6 (ref 5–9)
PH UA: 7 (ref 5–9)
PH UA: 7 (ref 5–9)
PLATELET # BLD: 245 E9/L (ref 130–450)
PLATELET # BLD: 248 E9/L (ref 130–450)
PLATELET # BLD: 251 E9/L (ref 130–450)
PLATELET # BLD: 255 E9/L (ref 130–450)
PLATELET # BLD: 256 E9/L (ref 130–450)
PLATELET # BLD: 276 E9/L (ref 130–450)
PLATELET # BLD: 292 E9/L (ref 130–450)
PLATELET # BLD: 295 E9/L (ref 130–450)
PLATELET # BLD: 301 E9/L (ref 130–450)
PLATELET # BLD: 307 E9/L (ref 130–450)
PLATELET # BLD: 312 E9/L (ref 130–450)
PLATELET # BLD: 321 E9/L (ref 130–450)
PLATELET # BLD: 322 E9/L (ref 130–450)
PLATELET # BLD: 338 E9/L (ref 130–450)
PLATELET # BLD: 339 E9/L (ref 130–450)
PLATELET # BLD: 340 E9/L (ref 130–450)
PLATELET # BLD: 369 E9/L (ref 130–450)
PLATELET # BLD: 421 E9/L (ref 130–450)
PMV BLD AUTO: 10 FL (ref 7–12)
PMV BLD AUTO: 10.1 FL (ref 7–12)
PMV BLD AUTO: 11.1 FL (ref 7–12)
PMV BLD AUTO: 8.9 FL (ref 7–12)
PMV BLD AUTO: 9.5 FL (ref 7–12)
PMV BLD AUTO: 9.6 FL (ref 7–12)
PMV BLD AUTO: 9.7 FL (ref 7–12)
PMV BLD AUTO: 9.8 FL (ref 7–12)
PMV BLD AUTO: 9.8 FL (ref 7–12)
PMV BLD AUTO: 9.9 FL (ref 7–12)
PO2: 76.7 MMHG (ref 60–100)
POC ANION GAP: 14
POC ANION GAP: 9 MMOL/L (ref 7–16)
POC BUN: 19 MG/DL (ref 8–23)
POC BUN: 4
POC CHLORIDE: 104 MMOL/L (ref 100–108)
POC CHLORIDE: 95
POC CO2: 30
POC CREATININE: 0.4
POC CREATININE: 0.6 MG/DL (ref 0.5–1)
POC POTASSIUM: 3
POC POTASSIUM: 5.1 MMOL/L (ref 3.5–5)
POC SODIUM: 132 MMOL/L (ref 132–146)
POC SODIUM: 134
POIKILOCYTES: ABNORMAL
POLYCHROMASIA: ABNORMAL
POTASSIUM REFLEX MAGNESIUM: 3.5 MMOL/L (ref 3.5–5)
POTASSIUM SERPL-SCNC: 2.7 MMOL/L (ref 3.5–5)
POTASSIUM SERPL-SCNC: 2.8 MMOL/L (ref 3.5–5)
POTASSIUM SERPL-SCNC: 3.1 MMOL/L (ref 3.5–5)
POTASSIUM SERPL-SCNC: 3.2 MMOL/L (ref 3.5–5)
POTASSIUM SERPL-SCNC: 3.4 MMOL/L (ref 3.5–5)
POTASSIUM SERPL-SCNC: 3.6 MMOL/L (ref 3.5–5)
POTASSIUM SERPL-SCNC: 3.7 MMOL/L (ref 3.5–5)
POTASSIUM SERPL-SCNC: 3.7 MMOL/L (ref 3.5–5)
POTASSIUM SERPL-SCNC: 3.8 MMOL/L (ref 3.5–5)
POTASSIUM SERPL-SCNC: 3.9 MMOL/L (ref 3.5–5)
POTASSIUM SERPL-SCNC: 4 MMOL/L (ref 3.5–5)
POTASSIUM SERPL-SCNC: 4.1 MMOL/L (ref 3.5–5)
POTASSIUM SERPL-SCNC: 4.2 MMOL/L (ref 3.5–5)
POTASSIUM SERPL-SCNC: 4.7 MMOL/L (ref 3.5–5)
POTASSIUM SERPL-SCNC: 6.1 MMOL/L (ref 3.5–5)
PROTEIN UA: NEGATIVE MG/DL
PROTEIN UA: NORMAL MG/DL
RBC # BLD: 2.69 E12/L (ref 3.5–5.5)
RBC # BLD: 2.78 E12/L (ref 3.5–5.5)
RBC # BLD: 2.89 E12/L (ref 3.5–5.5)
RBC # BLD: 2.9 E12/L (ref 3.5–5.5)
RBC # BLD: 2.9 E12/L (ref 3.5–5.5)
RBC # BLD: 3.03 E12/L (ref 3.5–5.5)
RBC # BLD: 3.06 E12/L (ref 3.5–5.5)
RBC # BLD: 3.06 E12/L (ref 3.5–5.5)
RBC # BLD: 3.12 E12/L (ref 3.5–5.5)
RBC # BLD: 3.24 E12/L (ref 3.5–5.5)
RBC # BLD: 3.44 E12/L (ref 3.5–5.5)
RBC # BLD: 3.45 E12/L (ref 3.5–5.5)
RBC # BLD: 3.46 E12/L (ref 3.5–5.5)
RBC # BLD: 3.5 E12/L (ref 3.5–5.5)
RBC # BLD: 3.6 E12/L (ref 3.5–5.5)
RBC # BLD: 3.63 E12/L (ref 3.5–5.5)
RBC # BLD: 3.67 E12/L (ref 3.5–5.5)
RBC # BLD: 3.83 E12/L (ref 3.5–5.5)
RBC UA: ABNORMAL /HPF (ref 0–2)
RBC UA: NORMAL /HPF (ref 0–2)
RETIC HGB EQUIVALENT: 26.4 PG (ref 28.2–36.6)
RETICULOCYTE ABSOLUTE COUNT: 0.07 E12/L
RETICULOCYTE COUNT PCT: 2.1 % (ref 0.4–1.9)
SMEAR, RESPIRATORY: ABNORMAL
SODIUM BLD-SCNC: 130 MMOL/L (ref 132–146)
SODIUM BLD-SCNC: 132 MMOL/L (ref 132–146)
SODIUM BLD-SCNC: 132 MMOL/L (ref 132–146)
SODIUM BLD-SCNC: 135 MMOL/L (ref 132–146)
SODIUM BLD-SCNC: 135 MMOL/L (ref 132–146)
SODIUM BLD-SCNC: 136 MMOL/L (ref 132–146)
SODIUM BLD-SCNC: 137 MMOL/L (ref 132–146)
SODIUM BLD-SCNC: 137 MMOL/L (ref 132–146)
SODIUM BLD-SCNC: 138 MMOL/L (ref 132–146)
SODIUM BLD-SCNC: 138 MMOL/L (ref 132–146)
SODIUM BLD-SCNC: 140 MMOL/L (ref 132–146)
SODIUM BLD-SCNC: 140 MMOL/L (ref 132–146)
SODIUM BLD-SCNC: 142 MMOL/L (ref 132–146)
SODIUM BLD-SCNC: 142 MMOL/L (ref 132–146)
SODIUM BLD-SCNC: 143 MMOL/L (ref 132–146)
SOURCE, BLOOD GAS: ABNORMAL
SPECIFIC GRAVITY UA: 1.01 (ref 1–1.03)
SPECIFIC GRAVITY UA: <=1.005 (ref 1–1.03)
TARGET CELLS: ABNORMAL
THB: 9.9 G/DL (ref 11.5–16.5)
TIME ANALYZED: 728
TOTAL IRON BINDING CAPACITY: 296 MCG/DL (ref 250–450)
TOTAL IRON BINDING CAPACITY: 407 MCG/DL (ref 250–450)
TOTAL PROTEIN: 5.6 G/DL (ref 6.4–8.3)
TOTAL PROTEIN: 5.7 G/DL (ref 6.4–8.3)
TOTAL PROTEIN: 5.8 G/DL (ref 6.4–8.3)
TOTAL PROTEIN: 6 G/DL (ref 6.4–8.3)
TOTAL PROTEIN: 6.1 G/DL (ref 6.4–8.3)
TOTAL PROTEIN: 6.3 G/DL (ref 6.4–8.3)
TOTAL PROTEIN: 6.3 G/DL (ref 6.4–8.3)
TOTAL PROTEIN: 6.4 G/DL (ref 6.4–8.3)
TOTAL PROTEIN: 6.5 G/DL (ref 6.4–8.3)
TOTAL PROTEIN: 6.5 G/DL (ref 6.4–8.3)
TOTAL PROTEIN: 6.9 G/DL (ref 6.4–8.3)
TOTAL PROTEIN: 6.9 G/DL (ref 6.4–8.3)
TOTAL PROTEIN: 7 G/DL (ref 6.4–8.3)
TOTAL PROTEIN: 7.3 G/DL (ref 6.4–8.3)
TOTAL PROTEIN: 7.4 G/DL (ref 6.4–8.3)
TRANSFERRIN: 252 MG/DL (ref 200–360)
TRANSFERRIN: 345 MG/DL (ref 200–360)
TRIGL SERPL-MCNC: 136 MG/DL (ref 0–149)
TRIGL SERPL-MCNC: 209 MG/DL (ref 0–149)
TROPONIN: 0.01 NG/ML (ref 0–0.03)
TROPONIN: <0.01 NG/ML (ref 0–0.03)
TROPONIN: <0.01 NG/ML (ref 0–0.03)
TSH SERPL DL<=0.05 MIU/L-ACNC: 1.02 UIU/ML (ref 0.27–4.2)
TSH SERPL DL<=0.05 MIU/L-ACNC: 1.24 UIU/ML (ref 0.27–4.2)
UROBILINOGEN, URINE: 0.2 E.U./DL
VITAMIN B-12: 188 PG/ML (ref 211–946)
VITAMIN B-12: 223 PG/ML (ref 211–946)
VLDLC SERPL CALC-MCNC: 27 MG/DL
VLDLC SERPL CALC-MCNC: 42 MG/DL
WBC # BLD: 13 E9/L (ref 4.5–11.5)
WBC # BLD: 13.3 E9/L (ref 4.5–11.5)
WBC # BLD: 13.6 E9/L (ref 4.5–11.5)
WBC # BLD: 15.3 E9/L (ref 4.5–11.5)
WBC # BLD: 15.5 E9/L (ref 4.5–11.5)
WBC # BLD: 16.6 E9/L (ref 4.5–11.5)
WBC # BLD: 16.9 E9/L (ref 4.5–11.5)
WBC # BLD: 17.1 E9/L (ref 4.5–11.5)
WBC # BLD: 18.6 E9/L (ref 4.5–11.5)
WBC # BLD: 20.9 E9/L (ref 4.5–11.5)
WBC # BLD: 23.5 E9/L (ref 4.5–11.5)
WBC # BLD: 24 E9/L (ref 4.5–11.5)
WBC # BLD: 29.4 E9/L (ref 4.5–11.5)
WBC # BLD: 29.4 E9/L (ref 4.5–11.5)
WBC # BLD: 41.7 E9/L (ref 4.5–11.5)
WBC # BLD: 6.4 E9/L (ref 4.5–11.5)
WBC # BLD: 8.2 E9/L (ref 4.5–11.5)
WBC # BLD: 9.8 E9/L (ref 4.5–11.5)
WBC UA: ABNORMAL /HPF (ref 0–5)
WBC UA: NORMAL /HPF (ref 0–5)
WHITE BLOOD CELLS (WBC), STOOL: NORMAL

## 2018-01-01 PROCEDURE — 2700000000 HC OXYGEN THERAPY PER DAY

## 2018-01-01 PROCEDURE — 83540 ASSAY OF IRON: CPT

## 2018-01-01 PROCEDURE — 2580000003 HC RX 258: Performed by: FAMILY MEDICINE

## 2018-01-01 PROCEDURE — 87070 CULTURE OTHR SPECIMN AEROBIC: CPT

## 2018-01-01 PROCEDURE — 6370000000 HC RX 637 (ALT 250 FOR IP): Performed by: FAMILY MEDICINE

## 2018-01-01 PROCEDURE — 6370000000 HC RX 637 (ALT 250 FOR IP): Performed by: INTERNAL MEDICINE

## 2018-01-01 PROCEDURE — 1090F PRES/ABSN URINE INCON ASSESS: CPT | Performed by: FAMILY MEDICINE

## 2018-01-01 PROCEDURE — 99213 OFFICE O/P EST LOW 20 MIN: CPT | Performed by: FAMILY MEDICINE

## 2018-01-01 PROCEDURE — 83605 ASSAY OF LACTIC ACID: CPT

## 2018-01-01 PROCEDURE — 3044F HG A1C LEVEL LT 7.0%: CPT | Performed by: FAMILY MEDICINE

## 2018-01-01 PROCEDURE — 82962 GLUCOSE BLOOD TEST: CPT

## 2018-01-01 PROCEDURE — 6360000002 HC RX W HCPCS: Performed by: INTERNAL MEDICINE

## 2018-01-01 PROCEDURE — 83735 ASSAY OF MAGNESIUM: CPT

## 2018-01-01 PROCEDURE — P9046 ALBUMIN (HUMAN), 25%, 20 ML: HCPCS | Performed by: FAMILY MEDICINE

## 2018-01-01 PROCEDURE — 2580000003 HC RX 258: Performed by: INTERNAL MEDICINE

## 2018-01-01 PROCEDURE — 93005 ELECTROCARDIOGRAM TRACING: CPT | Performed by: INTERNAL MEDICINE

## 2018-01-01 PROCEDURE — 71045 X-RAY EXAM CHEST 1 VIEW: CPT

## 2018-01-01 PROCEDURE — 85025 COMPLETE CBC W/AUTO DIFF WBC: CPT

## 2018-01-01 PROCEDURE — 36415 COLL VENOUS BLD VENIPUNCTURE: CPT

## 2018-01-01 PROCEDURE — G8417 CALC BMI ABV UP PARAM F/U: HCPCS | Performed by: FAMILY MEDICINE

## 2018-01-01 PROCEDURE — G0009 ADMIN PNEUMOCOCCAL VACCINE: HCPCS

## 2018-01-01 PROCEDURE — 6360000002 HC RX W HCPCS: Performed by: FAMILY MEDICINE

## 2018-01-01 PROCEDURE — 97530 THERAPEUTIC ACTIVITIES: CPT

## 2018-01-01 PROCEDURE — 80061 LIPID PANEL: CPT

## 2018-01-01 PROCEDURE — 2060000000 HC ICU INTERMEDIATE R&B

## 2018-01-01 PROCEDURE — 80048 BASIC METABOLIC PNL TOTAL CA: CPT

## 2018-01-01 PROCEDURE — 89055 LEUKOCYTE ASSESSMENT FECAL: CPT

## 2018-01-01 PROCEDURE — 2022F DILAT RTA XM EVC RTNOPTHY: CPT | Performed by: FAMILY MEDICINE

## 2018-01-01 PROCEDURE — 80053 COMPREHEN METABOLIC PANEL: CPT

## 2018-01-01 PROCEDURE — 77080 DXA BONE DENSITY AXIAL: CPT

## 2018-01-01 PROCEDURE — 99212 OFFICE O/P EST SF 10 MIN: CPT | Performed by: FAMILY MEDICINE

## 2018-01-01 PROCEDURE — 93005 ELECTROCARDIOGRAM TRACING: CPT | Performed by: EMERGENCY MEDICINE

## 2018-01-01 PROCEDURE — 99232 SBSQ HOSP IP/OBS MODERATE 35: CPT | Performed by: SURGERY

## 2018-01-01 PROCEDURE — 6360000004 HC RX CONTRAST MEDICATION: Performed by: RADIOLOGY

## 2018-01-01 PROCEDURE — 1101F PT FALLS ASSESS-DOCD LE1/YR: CPT | Performed by: FAMILY MEDICINE

## 2018-01-01 PROCEDURE — 82746 ASSAY OF FOLIC ACID SERUM: CPT

## 2018-01-01 PROCEDURE — 99232 SBSQ HOSP IP/OBS MODERATE 35: CPT | Performed by: FAMILY MEDICINE

## 2018-01-01 PROCEDURE — 94668 MNPJ CHEST WALL SBSQ: CPT

## 2018-01-01 PROCEDURE — 74018 RADEX ABDOMEN 1 VIEW: CPT

## 2018-01-01 PROCEDURE — 83615 LACTATE (LD) (LDH) ENZYME: CPT

## 2018-01-01 PROCEDURE — 97110 THERAPEUTIC EXERCISES: CPT

## 2018-01-01 PROCEDURE — 82607 VITAMIN B-12: CPT

## 2018-01-01 PROCEDURE — 94762 N-INVAS EAR/PLS OXIMTRY CONT: CPT

## 2018-01-01 PROCEDURE — G8427 DOCREV CUR MEDS BY ELIG CLIN: HCPCS | Performed by: FAMILY MEDICINE

## 2018-01-01 PROCEDURE — 94640 AIRWAY INHALATION TREATMENT: CPT

## 2018-01-01 PROCEDURE — G8987 SELF CARE CURRENT STATUS: HCPCS

## 2018-01-01 PROCEDURE — 6360000002 HC RX W HCPCS: Performed by: SURGERY

## 2018-01-01 PROCEDURE — 96374 THER/PROPH/DIAG INJ IV PUSH: CPT

## 2018-01-01 PROCEDURE — 81001 URINALYSIS AUTO W/SCOPE: CPT

## 2018-01-01 PROCEDURE — 94761 N-INVAS EAR/PLS OXIMETRY MLT: CPT

## 2018-01-01 PROCEDURE — 84466 ASSAY OF TRANSFERRIN: CPT

## 2018-01-01 PROCEDURE — 85018 HEMOGLOBIN: CPT

## 2018-01-01 PROCEDURE — 4040F PNEUMOC VAC/ADMIN/RCVD: CPT | Performed by: FAMILY MEDICINE

## 2018-01-01 PROCEDURE — 2580000003 HC RX 258: Performed by: EMERGENCY MEDICINE

## 2018-01-01 PROCEDURE — 81003 URINALYSIS AUTO W/O SCOPE: CPT

## 2018-01-01 PROCEDURE — 87486 CHLMYD PNEUM DNA AMP PROBE: CPT

## 2018-01-01 PROCEDURE — 96376 TX/PRO/DX INJ SAME DRUG ADON: CPT

## 2018-01-01 PROCEDURE — G8420 CALC BMI NORM PARAMETERS: HCPCS | Performed by: FAMILY MEDICINE

## 2018-01-01 PROCEDURE — C9113 INJ PANTOPRAZOLE SODIUM, VIA: HCPCS | Performed by: INTERNAL MEDICINE

## 2018-01-01 PROCEDURE — 6370000000 HC RX 637 (ALT 250 FOR IP): Performed by: PHYSICIAN ASSISTANT

## 2018-01-01 PROCEDURE — 86850 RBC ANTIBODY SCREEN: CPT

## 2018-01-01 PROCEDURE — 99223 1ST HOSP IP/OBS HIGH 75: CPT | Performed by: FAMILY MEDICINE

## 2018-01-01 PROCEDURE — 2580000003 HC RX 258: Performed by: SURGERY

## 2018-01-01 PROCEDURE — 6360000002 HC RX W HCPCS: Performed by: EMERGENCY MEDICINE

## 2018-01-01 PROCEDURE — 94150 VITAL CAPACITY TEST: CPT

## 2018-01-01 PROCEDURE — 83690 ASSAY OF LIPASE: CPT

## 2018-01-01 PROCEDURE — 3014F SCREEN MAMMO DOC REV: CPT | Performed by: FAMILY MEDICINE

## 2018-01-01 PROCEDURE — 6360000002 HC RX W HCPCS: Performed by: PHYSICIAN ASSISTANT

## 2018-01-01 PROCEDURE — 99285 EMERGENCY DEPT VISIT HI MDM: CPT

## 2018-01-01 PROCEDURE — 87633 RESP VIRUS 12-25 TARGETS: CPT

## 2018-01-01 PROCEDURE — 1123F ACP DISCUSS/DSCN MKR DOCD: CPT | Performed by: FAMILY MEDICINE

## 2018-01-01 PROCEDURE — 96375 TX/PRO/DX INJ NEW DRUG ADDON: CPT

## 2018-01-01 PROCEDURE — 2500000003 HC RX 250 WO HCPCS: Performed by: SURGERY

## 2018-01-01 PROCEDURE — 70450 CT HEAD/BRAIN W/O DYE: CPT

## 2018-01-01 PROCEDURE — 82947 ASSAY GLUCOSE BLOOD QUANT: CPT

## 2018-01-01 PROCEDURE — 97535 SELF CARE MNGMENT TRAINING: CPT

## 2018-01-01 PROCEDURE — 87581 M.PNEUMON DNA AMP PROBE: CPT

## 2018-01-01 PROCEDURE — 3023F SPIROM DOC REV: CPT | Performed by: FAMILY MEDICINE

## 2018-01-01 PROCEDURE — 87324 CLOSTRIDIUM AG IA: CPT

## 2018-01-01 PROCEDURE — 74177 CT ABD & PELVIS W/CONTRAST: CPT

## 2018-01-01 PROCEDURE — 4004F PT TOBACCO SCREEN RCVD TLK: CPT | Performed by: FAMILY MEDICINE

## 2018-01-01 PROCEDURE — 85027 COMPLETE CBC AUTOMATED: CPT

## 2018-01-01 PROCEDURE — 87206 SMEAR FLUORESCENT/ACID STAI: CPT

## 2018-01-01 PROCEDURE — 6370000000 HC RX 637 (ALT 250 FOR IP): Performed by: EMERGENCY MEDICINE

## 2018-01-01 PROCEDURE — 87081 CULTURE SCREEN ONLY: CPT

## 2018-01-01 PROCEDURE — 2580000003 HC RX 258: Performed by: STUDENT IN AN ORGANIZED HEALTH CARE EDUCATION/TRAINING PROGRAM

## 2018-01-01 PROCEDURE — 82728 ASSAY OF FERRITIN: CPT

## 2018-01-01 PROCEDURE — 1111F DSCHRG MED/CURRENT MED MERGE: CPT | Performed by: FAMILY MEDICINE

## 2018-01-01 PROCEDURE — 86920 COMPATIBILITY TEST SPIN: CPT

## 2018-01-01 PROCEDURE — C9113 INJ PANTOPRAZOLE SODIUM, VIA: HCPCS | Performed by: FAMILY MEDICINE

## 2018-01-01 PROCEDURE — 87186 SC STD MICRODIL/AGAR DIL: CPT

## 2018-01-01 PROCEDURE — P9016 RBC LEUKOCYTES REDUCED: HCPCS

## 2018-01-01 PROCEDURE — 97116 GAIT TRAINING THERAPY: CPT | Performed by: PHYSICAL THERAPIST

## 2018-01-01 PROCEDURE — 93306 TTE W/DOPPLER COMPLETE: CPT

## 2018-01-01 PROCEDURE — 51798 US URINE CAPACITY MEASURE: CPT

## 2018-01-01 PROCEDURE — G8400 PT W/DXA NO RESULTS DOC: HCPCS | Performed by: FAMILY MEDICINE

## 2018-01-01 PROCEDURE — 83036 HEMOGLOBIN GLYCOSYLATED A1C: CPT

## 2018-01-01 PROCEDURE — 71275 CT ANGIOGRAPHY CHEST: CPT

## 2018-01-01 PROCEDURE — 83550 IRON BINDING TEST: CPT

## 2018-01-01 PROCEDURE — G8926 SPIRO NO PERF OR DOC: HCPCS | Performed by: FAMILY MEDICINE

## 2018-01-01 PROCEDURE — G8988 SELF CARE GOAL STATUS: HCPCS

## 2018-01-01 PROCEDURE — 97166 OT EVAL MOD COMPLEX 45 MIN: CPT

## 2018-01-01 PROCEDURE — 3017F COLORECTAL CA SCREEN DOC REV: CPT | Performed by: FAMILY MEDICINE

## 2018-01-01 PROCEDURE — 71250 CT THORAX DX C-: CPT

## 2018-01-01 PROCEDURE — 99223 1ST HOSP IP/OBS HIGH 75: CPT | Performed by: SURGERY

## 2018-01-01 PROCEDURE — 82805 BLOOD GASES W/O2 SATURATION: CPT

## 2018-01-01 PROCEDURE — 84520 ASSAY OF UREA NITROGEN: CPT

## 2018-01-01 PROCEDURE — 6360000002 HC RX W HCPCS: Performed by: STUDENT IN AN ORGANIZED HEALTH CARE EDUCATION/TRAINING PROGRAM

## 2018-01-01 PROCEDURE — 84132 ASSAY OF SERUM POTASSIUM: CPT

## 2018-01-01 PROCEDURE — 84443 ASSAY THYROID STIM HORMONE: CPT

## 2018-01-01 PROCEDURE — 97162 PT EVAL MOD COMPLEX 30 MIN: CPT | Performed by: PHYSICAL THERAPIST

## 2018-01-01 PROCEDURE — 99284 EMERGENCY DEPT VISIT MOD MDM: CPT

## 2018-01-01 PROCEDURE — 87798 DETECT AGENT NOS DNA AMP: CPT

## 2018-01-01 PROCEDURE — G8979 MOBILITY GOAL STATUS: HCPCS | Performed by: PHYSICAL THERAPIST

## 2018-01-01 PROCEDURE — 97161 PT EVAL LOW COMPLEX 20 MIN: CPT | Performed by: PHYSICAL THERAPIST

## 2018-01-01 PROCEDURE — 86901 BLOOD TYPING SEROLOGIC RH(D): CPT

## 2018-01-01 PROCEDURE — 84484 ASSAY OF TROPONIN QUANT: CPT

## 2018-01-01 PROCEDURE — 80051 ELECTROLYTE PANEL: CPT

## 2018-01-01 PROCEDURE — 1036F TOBACCO NON-USER: CPT | Performed by: FAMILY MEDICINE

## 2018-01-01 PROCEDURE — 85045 AUTOMATED RETICULOCYTE COUNT: CPT

## 2018-01-01 PROCEDURE — 87329 GIARDIA AG IA: CPT

## 2018-01-01 PROCEDURE — G8482 FLU IMMUNIZE ORDER/ADMIN: HCPCS | Performed by: FAMILY MEDICINE

## 2018-01-01 PROCEDURE — 96372 THER/PROPH/DIAG INJ SC/IM: CPT

## 2018-01-01 PROCEDURE — 2580000003 HC RX 258: Performed by: PHYSICIAN ASSISTANT

## 2018-01-01 PROCEDURE — 2000000000 HC ICU R&B

## 2018-01-01 PROCEDURE — 86900 BLOOD TYPING SEROLOGIC ABO: CPT

## 2018-01-01 PROCEDURE — 83036 HEMOGLOBIN GLYCOSYLATED A1C: CPT | Performed by: FAMILY MEDICINE

## 2018-01-01 PROCEDURE — 87040 BLOOD CULTURE FOR BACTERIA: CPT

## 2018-01-01 PROCEDURE — 85014 HEMATOCRIT: CPT

## 2018-01-01 PROCEDURE — 87045 FECES CULTURE AEROBIC BACT: CPT

## 2018-01-01 PROCEDURE — G8399 PT W/DXA RESULTS DOCUMENT: HCPCS | Performed by: FAMILY MEDICINE

## 2018-01-01 PROCEDURE — 94664 DEMO&/EVAL PT USE INHALER: CPT

## 2018-01-01 PROCEDURE — 87077 CULTURE AEROBIC IDENTIFY: CPT

## 2018-01-01 PROCEDURE — 94667 MNPJ CHEST WALL 1ST: CPT

## 2018-01-01 PROCEDURE — G8978 MOBILITY CURRENT STATUS: HCPCS | Performed by: PHYSICAL THERAPIST

## 2018-01-01 PROCEDURE — 3045F PR MOST RECENT HEMOGLOBIN A1C LEVEL 7.0-9.0%: CPT | Performed by: FAMILY MEDICINE

## 2018-01-01 PROCEDURE — 96365 THER/PROPH/DIAG IV INF INIT: CPT

## 2018-01-01 PROCEDURE — 97116 GAIT TRAINING THERAPY: CPT

## 2018-01-01 PROCEDURE — 36600 WITHDRAWAL OF ARTERIAL BLOOD: CPT

## 2018-01-01 PROCEDURE — 82565 ASSAY OF CREATININE: CPT

## 2018-01-01 PROCEDURE — 36430 TRANSFUSION BLD/BLD COMPNT: CPT

## 2018-01-01 PROCEDURE — G0328 FECAL BLOOD SCRN IMMUNOASSAY: HCPCS

## 2018-01-01 PROCEDURE — 97165 OT EVAL LOW COMPLEX 30 MIN: CPT

## 2018-01-01 PROCEDURE — 97530 THERAPEUTIC ACTIVITIES: CPT | Performed by: PHYSICAL THERAPIST

## 2018-01-01 RX ORDER — QUETIAPINE FUMARATE 200 MG/1
200 TABLET, FILM COATED ORAL NIGHTLY
Qty: 60 TABLET | Refills: 3 | DISCHARGE
Start: 2018-01-01

## 2018-01-01 RX ORDER — FOLIC ACID 1 MG/1
1 TABLET ORAL DAILY
Status: DISCONTINUED | OUTPATIENT
Start: 2018-01-01 | End: 2018-01-01 | Stop reason: HOSPADM

## 2018-01-01 RX ORDER — ONDANSETRON 4 MG/1
4 TABLET, ORALLY DISINTEGRATING ORAL EVERY 8 HOURS PRN
Qty: 30 TABLET | Refills: 0 | Status: SHIPPED | OUTPATIENT
Start: 2018-01-01

## 2018-01-01 RX ORDER — PRAVASTATIN SODIUM 20 MG
40 TABLET ORAL DAILY
Qty: 180 TABLET | Refills: 0 | Status: SHIPPED | OUTPATIENT
Start: 2018-01-01 | End: 2018-01-01 | Stop reason: SDUPTHER

## 2018-01-01 RX ORDER — ERGOCALCIFEROL 1.25 MG/1
50000 CAPSULE ORAL WEEKLY
Qty: 4 CAPSULE | Refills: 1 | Status: SHIPPED | OUTPATIENT
Start: 2018-01-01 | End: 2018-01-01 | Stop reason: SDUPTHER

## 2018-01-01 RX ORDER — ONDANSETRON 2 MG/ML
4 INJECTION INTRAMUSCULAR; INTRAVENOUS EVERY 6 HOURS PRN
Status: DISCONTINUED | OUTPATIENT
Start: 2018-01-01 | End: 2018-01-01 | Stop reason: SDUPTHER

## 2018-01-01 RX ORDER — 0.9 % SODIUM CHLORIDE 0.9 %
500 INTRAVENOUS SOLUTION INTRAVENOUS ONCE
Status: COMPLETED | OUTPATIENT
Start: 2018-01-01 | End: 2018-01-01

## 2018-01-01 RX ORDER — OMEPRAZOLE 20 MG/1
20 CAPSULE, DELAYED RELEASE ORAL DAILY
Qty: 90 CAPSULE | Refills: 0 | Status: ON HOLD | OUTPATIENT
Start: 2018-01-01 | End: 2018-01-01 | Stop reason: HOSPADM

## 2018-01-01 RX ORDER — SERTRALINE HYDROCHLORIDE 100 MG/1
100 TABLET, FILM COATED ORAL NIGHTLY
Status: ON HOLD | COMMUNITY
End: 2018-01-01 | Stop reason: HOSPADM

## 2018-01-01 RX ORDER — FOLIC ACID 1 MG/1
1 TABLET ORAL DAILY
Qty: 30 TABLET | Refills: 3 | Status: ON HOLD | OUTPATIENT
Start: 2018-01-01 | End: 2018-01-01 | Stop reason: HOSPADM

## 2018-01-01 RX ORDER — DILTIAZEM HYDROCHLORIDE 120 MG/1
120 CAPSULE, COATED, EXTENDED RELEASE ORAL
Qty: 90 CAPSULE | Refills: 0 | Status: SHIPPED | OUTPATIENT
Start: 2018-01-01 | End: 2018-01-01 | Stop reason: SDUPTHER

## 2018-01-01 RX ORDER — POTASSIUM CHLORIDE 20 MEQ/1
20 TABLET, EXTENDED RELEASE ORAL DAILY
Qty: 60 TABLET | Refills: 3 | DISCHARGE
Start: 2018-01-01 | End: 2018-01-01 | Stop reason: SDUPTHER

## 2018-01-01 RX ORDER — SODIUM CHLORIDE 0.9 % (FLUSH) 0.9 %
10 SYRINGE (ML) INJECTION EVERY 12 HOURS SCHEDULED
Status: DISCONTINUED | OUTPATIENT
Start: 2018-01-01 | End: 2018-01-01 | Stop reason: HOSPADM

## 2018-01-01 RX ORDER — BUMETANIDE 1 MG/1
2 TABLET ORAL 2 TIMES DAILY
Status: DISCONTINUED | OUTPATIENT
Start: 2018-01-01 | End: 2018-01-01

## 2018-01-01 RX ORDER — ACETAMINOPHEN 325 MG/1
650 TABLET ORAL EVERY 4 HOURS PRN
Status: DISCONTINUED | OUTPATIENT
Start: 2018-01-01 | End: 2018-01-01 | Stop reason: HOSPADM

## 2018-01-01 RX ORDER — POTASSIUM CHLORIDE 20 MEQ/1
20 TABLET, EXTENDED RELEASE ORAL DAILY
Status: DISCONTINUED | OUTPATIENT
Start: 2018-01-01 | End: 2018-01-01

## 2018-01-01 RX ORDER — ALBUTEROL SULFATE 90 UG/1
1 AEROSOL, METERED RESPIRATORY (INHALATION) EVERY 6 HOURS PRN
Qty: 1 INHALER | Refills: 3 | DISCHARGE
Start: 2018-01-01

## 2018-01-01 RX ORDER — SODIUM CHLORIDE 9 MG/ML
1000 INJECTION, SOLUTION INTRAVENOUS ONCE
Status: DISCONTINUED | OUTPATIENT
Start: 2018-01-01 | End: 2018-01-01

## 2018-01-01 RX ORDER — OMEPRAZOLE 20 MG/1
20 CAPSULE, DELAYED RELEASE ORAL DAILY
Qty: 90 CAPSULE | Refills: 0 | Status: CANCELLED | OUTPATIENT
Start: 2018-01-01

## 2018-01-01 RX ORDER — 0.9 % SODIUM CHLORIDE 0.9 %
1000 INTRAVENOUS SOLUTION INTRAVENOUS ONCE
Status: COMPLETED | OUTPATIENT
Start: 2018-01-01 | End: 2018-01-01

## 2018-01-01 RX ORDER — QUETIAPINE FUMARATE 100 MG/1
200 TABLET, FILM COATED ORAL NIGHTLY
Status: DISCONTINUED | OUTPATIENT
Start: 2018-01-01 | End: 2018-01-01 | Stop reason: HOSPADM

## 2018-01-01 RX ORDER — ROPINIROLE 1 MG/1
1 TABLET, FILM COATED ORAL NIGHTLY
Qty: 90 TABLET | Refills: 0 | Status: SHIPPED | OUTPATIENT
Start: 2018-01-01 | End: 2018-01-01 | Stop reason: SDUPTHER

## 2018-01-01 RX ORDER — METRONIDAZOLE 500 MG/1
500 TABLET ORAL EVERY 8 HOURS SCHEDULED
Qty: 30 TABLET | Refills: 0 | DISCHARGE
Start: 2018-01-01 | End: 2018-01-01

## 2018-01-01 RX ORDER — FUROSEMIDE 10 MG/ML
20 INJECTION INTRAMUSCULAR; INTRAVENOUS ONCE
Status: COMPLETED | OUTPATIENT
Start: 2018-01-01 | End: 2018-01-01

## 2018-01-01 RX ORDER — ONDANSETRON 4 MG/1
4 TABLET, ORALLY DISINTEGRATING ORAL EVERY 8 HOURS PRN
Qty: 20 TABLET | Refills: 0 | Status: ON HOLD | OUTPATIENT
Start: 2018-01-01 | End: 2018-01-01 | Stop reason: HOSPADM

## 2018-01-01 RX ORDER — OMEGA-3-ACID ETHYL ESTERS 1 G/1
2 CAPSULE, LIQUID FILLED ORAL 2 TIMES DAILY
Qty: 60 CAPSULE | Refills: 3 | Status: ON HOLD | OUTPATIENT
Start: 2018-01-01 | End: 2018-01-01 | Stop reason: HOSPADM

## 2018-01-01 RX ORDER — BUDESONIDE 0.5 MG/2ML
0.5 INHALANT ORAL 2 TIMES DAILY
Qty: 60 AMPULE | Refills: 3 | DISCHARGE
Start: 2018-01-01 | End: 2018-01-01 | Stop reason: SDUPTHER

## 2018-01-01 RX ORDER — TRAZODONE HYDROCHLORIDE 150 MG/1
75 TABLET ORAL NIGHTLY
COMMUNITY
End: 2018-01-01 | Stop reason: ALTCHOICE

## 2018-01-01 RX ORDER — OMEPRAZOLE 20 MG/1
20 CAPSULE, DELAYED RELEASE ORAL DAILY
Qty: 90 CAPSULE | Refills: 0 | Status: SHIPPED | OUTPATIENT
Start: 2018-01-01 | End: 2018-01-01 | Stop reason: SDUPTHER

## 2018-01-01 RX ORDER — CLOTRIMAZOLE 10 MG/1
10 LOZENGE ORAL; TOPICAL
Qty: 50 TABLET | Refills: 0 | DISCHARGE
Start: 2018-01-01 | End: 2018-01-01

## 2018-01-01 RX ORDER — BUMETANIDE 2 MG/1
2 TABLET ORAL EVERY OTHER DAY
Qty: 30 TABLET | Refills: 3 | Status: SHIPPED | OUTPATIENT
Start: 2018-01-01 | End: 2019-01-01 | Stop reason: SDUPTHER

## 2018-01-01 RX ORDER — BUDESONIDE 0.5 MG/2ML
0.5 INHALANT ORAL 2 TIMES DAILY
Status: DISCONTINUED | OUTPATIENT
Start: 2018-01-01 | End: 2018-01-01 | Stop reason: HOSPADM

## 2018-01-01 RX ORDER — CALCIUM CITRATE 1040MG
1 TABLET ORAL DAILY
Status: DISCONTINUED | OUTPATIENT
Start: 2018-01-01 | End: 2018-01-01

## 2018-01-01 RX ORDER — IPRATROPIUM BROMIDE AND ALBUTEROL SULFATE 2.5; .5 MG/3ML; MG/3ML
1 SOLUTION RESPIRATORY (INHALATION)
Status: DISCONTINUED | OUTPATIENT
Start: 2018-01-01 | End: 2018-01-01

## 2018-01-01 RX ORDER — METHYLPREDNISOLONE SODIUM SUCCINATE 40 MG/ML
40 INJECTION, POWDER, LYOPHILIZED, FOR SOLUTION INTRAMUSCULAR; INTRAVENOUS EVERY 12 HOURS
Status: ON HOLD | DISCHARGE
Start: 2018-01-01 | End: 2018-01-01 | Stop reason: HOSPADM

## 2018-01-01 RX ORDER — FERROUS SULFATE 325(65) MG
325 TABLET ORAL
Qty: 30 TABLET | Refills: 3 | Status: SHIPPED | OUTPATIENT
Start: 2018-01-01 | End: 2018-01-01 | Stop reason: SDUPTHER

## 2018-01-01 RX ORDER — MORPHINE SULFATE 4 MG/ML
4 INJECTION, SOLUTION INTRAMUSCULAR; INTRAVENOUS
Status: DISCONTINUED | OUTPATIENT
Start: 2018-01-01 | End: 2018-01-01 | Stop reason: CLARIF

## 2018-01-01 RX ORDER — PRAVASTATIN SODIUM 20 MG
40 TABLET ORAL DAILY
Qty: 180 TABLET | Refills: 0 | Status: ON HOLD | OUTPATIENT
Start: 2018-01-01 | End: 2018-01-01 | Stop reason: HOSPADM

## 2018-01-01 RX ORDER — MORPHINE SULFATE 4 MG/ML
3 INJECTION, SOLUTION INTRAMUSCULAR; INTRAVENOUS
Status: DISCONTINUED | OUTPATIENT
Start: 2018-01-01 | End: 2018-01-01

## 2018-01-01 RX ORDER — POTASSIUM CHLORIDE 1.5 G/1.77G
40 POWDER, FOR SOLUTION ORAL ONCE
Status: COMPLETED | OUTPATIENT
Start: 2018-01-01 | End: 2018-01-01

## 2018-01-01 RX ORDER — ERGOCALCIFEROL 1.25 MG/1
50000 CAPSULE ORAL WEEKLY
Qty: 12 CAPSULE | Refills: 0 | Status: SHIPPED | OUTPATIENT
Start: 2018-01-01 | End: 2018-01-01 | Stop reason: SDUPTHER

## 2018-01-01 RX ORDER — SODIUM CHLORIDE 0.9 % (FLUSH) 0.9 %
10 SYRINGE (ML) INJECTION PRN
Status: DISCONTINUED | OUTPATIENT
Start: 2018-01-01 | End: 2018-01-01

## 2018-01-01 RX ORDER — SERTRALINE HYDROCHLORIDE 100 MG/1
100 TABLET, FILM COATED ORAL NIGHTLY
Qty: 30 TABLET | Refills: 3 | DISCHARGE
Start: 2018-01-01

## 2018-01-01 RX ORDER — PRAVASTATIN SODIUM 20 MG
40 TABLET ORAL DAILY
Status: DISCONTINUED | OUTPATIENT
Start: 2018-01-01 | End: 2018-01-01

## 2018-01-01 RX ORDER — MORPHINE SULFATE 4 MG/ML
4 INJECTION, SOLUTION INTRAMUSCULAR; INTRAVENOUS ONCE
Status: COMPLETED | OUTPATIENT
Start: 2018-01-01 | End: 2018-01-01

## 2018-01-01 RX ORDER — ONDANSETRON 2 MG/ML
4 INJECTION INTRAMUSCULAR; INTRAVENOUS ONCE
Status: COMPLETED | OUTPATIENT
Start: 2018-01-01 | End: 2018-01-01

## 2018-01-01 RX ORDER — BUMETANIDE 2 MG/1
2 TABLET ORAL EVERY OTHER DAY
Qty: 30 TABLET | Refills: 3 | DISCHARGE
Start: 2018-01-01 | End: 2018-01-01 | Stop reason: SDUPTHER

## 2018-01-01 RX ORDER — NICOTINE POLACRILEX 4 MG
15 LOZENGE BUCCAL PRN
Status: DISCONTINUED | OUTPATIENT
Start: 2018-01-01 | End: 2018-01-01 | Stop reason: HOSPADM

## 2018-01-01 RX ORDER — IPRATROPIUM BROMIDE AND ALBUTEROL SULFATE 2.5; .5 MG/3ML; MG/3ML
3 SOLUTION RESPIRATORY (INHALATION) EVERY 4 HOURS
Qty: 360 ML | DISCHARGE
Start: 2018-01-01 | End: 2018-01-01 | Stop reason: SDUPTHER

## 2018-01-01 RX ORDER — DEXTROSE MONOHYDRATE 50 MG/ML
100 INJECTION, SOLUTION INTRAVENOUS PRN
Status: DISCONTINUED | OUTPATIENT
Start: 2018-01-01 | End: 2018-01-01 | Stop reason: HOSPADM

## 2018-01-01 RX ORDER — BUMETANIDE 2 MG/1
2 TABLET ORAL 2 TIMES DAILY
Qty: 120 TABLET | Refills: 0 | Status: SHIPPED | OUTPATIENT
Start: 2018-01-01 | End: 2018-01-01 | Stop reason: SDUPTHER

## 2018-01-01 RX ORDER — BUMETANIDE 2 MG/1
2 TABLET ORAL 2 TIMES DAILY
Qty: 120 TABLET | Refills: 0 | Status: ON HOLD | OUTPATIENT
Start: 2018-01-01 | End: 2018-01-01 | Stop reason: HOSPADM

## 2018-01-01 RX ORDER — PANTOPRAZOLE SODIUM 40 MG/1
40 TABLET, DELAYED RELEASE ORAL
Qty: 30 TABLET | Refills: 0 | Status: ON HOLD | OUTPATIENT
Start: 2018-01-01 | End: 2018-01-01 | Stop reason: HOSPADM

## 2018-01-01 RX ORDER — DILTIAZEM HYDROCHLORIDE 120 MG/1
120 CAPSULE, COATED, EXTENDED RELEASE ORAL
Qty: 90 CAPSULE | Refills: 0 | Status: SHIPPED | OUTPATIENT
Start: 2018-01-01 | End: 2018-01-01 | Stop reason: ALTCHOICE

## 2018-01-01 RX ORDER — MAGNESIUM SULFATE IN WATER 40 MG/ML
2 INJECTION, SOLUTION INTRAVENOUS ONCE
Status: COMPLETED | OUTPATIENT
Start: 2018-01-01 | End: 2018-01-01

## 2018-01-01 RX ORDER — ALBUMIN (HUMAN) 12.5 G/50ML
25 SOLUTION INTRAVENOUS EVERY 6 HOURS
Status: COMPLETED | OUTPATIENT
Start: 2018-01-01 | End: 2018-01-01

## 2018-01-01 RX ORDER — PANTOPRAZOLE SODIUM 40 MG/1
40 TABLET, DELAYED RELEASE ORAL
Status: DISCONTINUED | OUTPATIENT
Start: 2018-01-01 | End: 2018-01-01 | Stop reason: HOSPADM

## 2018-01-01 RX ORDER — PRAVASTATIN SODIUM 20 MG
40 TABLET ORAL DAILY
Status: DISCONTINUED | OUTPATIENT
Start: 2018-01-01 | End: 2018-01-01 | Stop reason: HOSPADM

## 2018-01-01 RX ORDER — ACETAMINOPHEN 500 MG
1000 TABLET ORAL ONCE
Status: COMPLETED | OUTPATIENT
Start: 2018-01-01 | End: 2018-01-01

## 2018-01-01 RX ORDER — PANTOPRAZOLE SODIUM 40 MG/1
40 TABLET, DELAYED RELEASE ORAL
Qty: 30 TABLET | Refills: 3 | DISCHARGE
Start: 2018-01-01 | End: 2018-01-01 | Stop reason: SDUPTHER

## 2018-01-01 RX ORDER — IPRATROPIUM BROMIDE AND ALBUTEROL SULFATE 2.5; .5 MG/3ML; MG/3ML
1 SOLUTION RESPIRATORY (INHALATION)
Status: DISCONTINUED | OUTPATIENT
Start: 2018-01-01 | End: 2018-01-01 | Stop reason: HOSPADM

## 2018-01-01 RX ORDER — GABAPENTIN 300 MG/1
600 CAPSULE ORAL 2 TIMES DAILY
Qty: 120 CAPSULE | Refills: 2 | Status: ON HOLD | OUTPATIENT
Start: 2018-01-01 | End: 2018-01-01 | Stop reason: HOSPADM

## 2018-01-01 RX ORDER — SODIUM CHLORIDE, SODIUM LACTATE, POTASSIUM CHLORIDE, CALCIUM CHLORIDE 600; 310; 30; 20 MG/100ML; MG/100ML; MG/100ML; MG/100ML
INJECTION, SOLUTION INTRAVENOUS CONTINUOUS
Status: DISCONTINUED | OUTPATIENT
Start: 2018-01-01 | End: 2018-01-01

## 2018-01-01 RX ORDER — FERROUS SULFATE 325(65) MG
325 TABLET ORAL
Qty: 30 TABLET | Refills: 3 | Status: SHIPPED | OUTPATIENT
Start: 2018-01-01 | End: 2019-01-01 | Stop reason: SDUPTHER

## 2018-01-01 RX ORDER — POTASSIUM CHLORIDE 7.45 MG/ML
10 INJECTION INTRAVENOUS ONCE
Status: COMPLETED | OUTPATIENT
Start: 2018-01-01 | End: 2018-01-01

## 2018-01-01 RX ORDER — PANTOPRAZOLE SODIUM 40 MG/10ML
40 INJECTION, POWDER, LYOPHILIZED, FOR SOLUTION INTRAVENOUS DAILY
Status: DISCONTINUED | OUTPATIENT
Start: 2018-01-01 | End: 2018-01-01

## 2018-01-01 RX ORDER — FUROSEMIDE 10 MG/ML
40 INJECTION INTRAMUSCULAR; INTRAVENOUS ONCE
Status: COMPLETED | OUTPATIENT
Start: 2018-01-01 | End: 2018-01-01

## 2018-01-01 RX ORDER — MAGNESIUM SULFATE 1 G/100ML
1 INJECTION INTRAVENOUS ONCE
Status: COMPLETED | OUTPATIENT
Start: 2018-01-01 | End: 2018-01-01

## 2018-01-01 RX ORDER — FORMOTEROL FUMARATE 20 UG/2ML
20 SOLUTION RESPIRATORY (INHALATION) 2 TIMES DAILY
Status: DISCONTINUED | OUTPATIENT
Start: 2018-01-01 | End: 2018-01-01 | Stop reason: HOSPADM

## 2018-01-01 RX ORDER — DILTIAZEM HYDROCHLORIDE 120 MG/1
120 CAPSULE, COATED, EXTENDED RELEASE ORAL
Status: DISCONTINUED | OUTPATIENT
Start: 2018-01-01 | End: 2018-01-01

## 2018-01-01 RX ORDER — MORPHINE SULFATE 2 MG/ML
2 INJECTION, SOLUTION INTRAMUSCULAR; INTRAVENOUS
Status: DISCONTINUED | OUTPATIENT
Start: 2018-01-01 | End: 2018-01-01 | Stop reason: HOSPADM

## 2018-01-01 RX ORDER — CIPROFLOXACIN 500 MG/1
500 TABLET, FILM COATED ORAL EVERY 12 HOURS SCHEDULED
Qty: 20 TABLET | Refills: 0 | DISCHARGE
Start: 2018-01-01 | End: 2018-01-01

## 2018-01-01 RX ORDER — IPRATROPIUM BROMIDE AND ALBUTEROL SULFATE 2.5; .5 MG/3ML; MG/3ML
3 SOLUTION RESPIRATORY (INHALATION) EVERY 4 HOURS
Qty: 360 ML | Refills: 2 | Status: SHIPPED | OUTPATIENT
Start: 2018-01-01

## 2018-01-01 RX ORDER — FOLIC ACID 1 MG/1
1 TABLET ORAL DAILY
Qty: 30 TABLET | Refills: 3 | DISCHARGE
Start: 2018-01-01 | End: 2018-01-01 | Stop reason: SDUPTHER

## 2018-01-01 RX ORDER — IPRATROPIUM BROMIDE AND ALBUTEROL SULFATE 2.5; .5 MG/3ML; MG/3ML
1 SOLUTION RESPIRATORY (INHALATION) EVERY 4 HOURS
Status: DISCONTINUED | OUTPATIENT
Start: 2018-01-01 | End: 2018-01-01 | Stop reason: HOSPADM

## 2018-01-01 RX ORDER — UMECLIDINIUM 62.5 UG/1
AEROSOL, POWDER ORAL
Refills: 5 | Status: ON HOLD | COMMUNITY
Start: 2018-01-01 | End: 2018-01-01 | Stop reason: HOSPADM

## 2018-01-01 RX ORDER — FERROUS SULFATE 325(65) MG
325 TABLET ORAL
Status: DISCONTINUED | OUTPATIENT
Start: 2018-01-01 | End: 2018-01-01 | Stop reason: HOSPADM

## 2018-01-01 RX ORDER — ACETAMINOPHEN 325 MG/1
650 TABLET ORAL EVERY 4 HOURS PRN
Status: DISCONTINUED | OUTPATIENT
Start: 2018-01-01 | End: 2018-01-01 | Stop reason: SDUPTHER

## 2018-01-01 RX ORDER — DEXTROSE MONOHYDRATE 25 G/50ML
12.5 INJECTION, SOLUTION INTRAVENOUS PRN
Status: DISCONTINUED | OUTPATIENT
Start: 2018-01-01 | End: 2018-01-01 | Stop reason: HOSPADM

## 2018-01-01 RX ORDER — CLOTRIMAZOLE 10 MG/1
10 LOZENGE ORAL; TOPICAL
Status: DISCONTINUED | OUTPATIENT
Start: 2018-01-01 | End: 2018-01-01 | Stop reason: HOSPADM

## 2018-01-01 RX ORDER — SODIUM CHLORIDE 0.9 % (FLUSH) 0.9 %
10 SYRINGE (ML) INJECTION EVERY 12 HOURS SCHEDULED
Status: DISCONTINUED | OUTPATIENT
Start: 2018-01-01 | End: 2018-01-01 | Stop reason: SDUPTHER

## 2018-01-01 RX ORDER — 0.9 % SODIUM CHLORIDE 0.9 %
10 VIAL (ML) INJECTION DAILY
Status: DISCONTINUED | OUTPATIENT
Start: 2018-01-01 | End: 2018-01-01

## 2018-01-01 RX ORDER — ATORVASTATIN CALCIUM 10 MG/1
10 TABLET, FILM COATED ORAL NIGHTLY
Qty: 30 TABLET | Refills: 3 | Status: SHIPPED | OUTPATIENT
Start: 2018-01-01 | End: 2019-01-01 | Stop reason: SDUPTHER

## 2018-01-01 RX ORDER — LEVOFLOXACIN 500 MG/1
500 TABLET, FILM COATED ORAL DAILY
Status: DISCONTINUED | OUTPATIENT
Start: 2018-01-01 | End: 2018-01-01 | Stop reason: HOSPADM

## 2018-01-01 RX ORDER — FERROUS SULFATE 325(65) MG
325 TABLET ORAL
Qty: 30 TABLET | Refills: 0 | Status: SHIPPED | OUTPATIENT
Start: 2018-01-01 | End: 2018-01-01 | Stop reason: SDUPTHER

## 2018-01-01 RX ORDER — ROPINIROLE 1 MG/1
1 TABLET, FILM COATED ORAL NIGHTLY
Status: DISCONTINUED | OUTPATIENT
Start: 2018-01-01 | End: 2018-01-01

## 2018-01-01 RX ORDER — ERGOCALCIFEROL 1.25 MG/1
50000 CAPSULE ORAL WEEKLY
Status: DISCONTINUED | OUTPATIENT
Start: 2018-01-01 | End: 2018-01-01 | Stop reason: HOSPADM

## 2018-01-01 RX ORDER — DOXEPIN HYDROCHLORIDE 50 MG/G
CREAM TOPICAL
Status: ON HOLD | COMMUNITY
Start: 2018-01-01 | End: 2018-01-01 | Stop reason: HOSPADM

## 2018-01-01 RX ORDER — GABAPENTIN 300 MG/1
300 CAPSULE ORAL 2 TIMES DAILY
Status: DISCONTINUED | OUTPATIENT
Start: 2018-01-01 | End: 2018-01-01 | Stop reason: HOSPADM

## 2018-01-01 RX ORDER — POTASSIUM CHLORIDE 20 MEQ/1
20 TABLET, EXTENDED RELEASE ORAL DAILY
Qty: 90 TABLET | Refills: 0 | Status: ON HOLD | OUTPATIENT
Start: 2018-01-01 | End: 2018-01-01 | Stop reason: HOSPADM

## 2018-01-01 RX ORDER — SODIUM CHLORIDE 9 MG/ML
INJECTION, SOLUTION INTRAVENOUS CONTINUOUS
Status: DISCONTINUED | OUTPATIENT
Start: 2018-01-01 | End: 2018-01-01

## 2018-01-01 RX ORDER — FENTANYL CITRATE 50 UG/ML
25 INJECTION, SOLUTION INTRAMUSCULAR; INTRAVENOUS ONCE
Status: COMPLETED | OUTPATIENT
Start: 2018-01-01 | End: 2018-01-01

## 2018-01-01 RX ORDER — BUDESONIDE AND FORMOTEROL FUMARATE DIHYDRATE 160; 4.5 UG/1; UG/1
2 AEROSOL RESPIRATORY (INHALATION) 2 TIMES DAILY
Status: DISCONTINUED | OUTPATIENT
Start: 2018-01-01 | End: 2018-01-01 | Stop reason: CLARIF

## 2018-01-01 RX ORDER — ATORVASTATIN CALCIUM 10 MG/1
10 TABLET, FILM COATED ORAL NIGHTLY
Status: DISCONTINUED | OUTPATIENT
Start: 2018-01-01 | End: 2018-01-01 | Stop reason: HOSPADM

## 2018-01-01 RX ORDER — FOLIC ACID 1 MG/1
1 TABLET ORAL DAILY
Qty: 30 TABLET | Refills: 3 | Status: SHIPPED | OUTPATIENT
Start: 2018-01-01 | End: 2019-01-01 | Stop reason: SDUPTHER

## 2018-01-01 RX ORDER — POTASSIUM CHLORIDE 20 MEQ/1
20 TABLET, EXTENDED RELEASE ORAL DAILY
Status: DISCONTINUED | OUTPATIENT
Start: 2018-01-01 | End: 2018-01-01 | Stop reason: HOSPADM

## 2018-01-01 RX ORDER — MORPHINE SULFATE 10 MG/ML
6 INJECTION, SOLUTION INTRAMUSCULAR; INTRAVENOUS
Status: DISCONTINUED | OUTPATIENT
Start: 2018-01-01 | End: 2018-01-01

## 2018-01-01 RX ORDER — ROPINIROLE 1 MG/1
1 TABLET, FILM COATED ORAL NIGHTLY
Status: DISCONTINUED | OUTPATIENT
Start: 2018-01-01 | End: 2018-01-01 | Stop reason: HOSPADM

## 2018-01-01 RX ORDER — ROPINIROLE 1 MG/1
1 TABLET, FILM COATED ORAL NIGHTLY
Qty: 90 TABLET | Refills: 0 | Status: ON HOLD | OUTPATIENT
Start: 2018-01-01 | End: 2018-01-01 | Stop reason: HOSPADM

## 2018-01-01 RX ORDER — PANTOPRAZOLE SODIUM 40 MG/10ML
40 INJECTION, POWDER, LYOPHILIZED, FOR SOLUTION INTRAVENOUS 2 TIMES DAILY
Status: DISCONTINUED | OUTPATIENT
Start: 2018-01-01 | End: 2018-01-01

## 2018-01-01 RX ORDER — MORPHINE SULFATE 2 MG/ML
2 INJECTION, SOLUTION INTRAMUSCULAR; INTRAVENOUS
Status: DISCONTINUED | OUTPATIENT
Start: 2018-01-01 | End: 2018-01-01 | Stop reason: CLARIF

## 2018-01-01 RX ORDER — FERROUS SULFATE 325(65) MG
325 TABLET ORAL
Qty: 90 TABLET | Refills: 1 | Status: ON HOLD | OUTPATIENT
Start: 2018-01-01 | End: 2018-01-01 | Stop reason: HOSPADM

## 2018-01-01 RX ORDER — SERTRALINE HYDROCHLORIDE 100 MG/1
100 TABLET, FILM COATED ORAL NIGHTLY
Status: DISCONTINUED | OUTPATIENT
Start: 2018-01-01 | End: 2018-01-01 | Stop reason: HOSPADM

## 2018-01-01 RX ORDER — METHYLPREDNISOLONE SODIUM SUCCINATE 40 MG/ML
40 INJECTION, POWDER, LYOPHILIZED, FOR SOLUTION INTRAMUSCULAR; INTRAVENOUS EVERY 12 HOURS
Status: DISCONTINUED | OUTPATIENT
Start: 2018-01-01 | End: 2018-01-01 | Stop reason: HOSPADM

## 2018-01-01 RX ORDER — ALBUTEROL SULFATE 90 UG/1
1 AEROSOL, METERED RESPIRATORY (INHALATION) EVERY 6 HOURS PRN
Status: DISCONTINUED | OUTPATIENT
Start: 2018-01-01 | End: 2018-01-01 | Stop reason: HOSPADM

## 2018-01-01 RX ORDER — ROPINIROLE 1 MG/1
1 TABLET, FILM COATED ORAL NIGHTLY
Qty: 90 TABLET | Refills: 3 | Status: SHIPPED | OUTPATIENT
Start: 2018-01-01 | End: 2019-01-01 | Stop reason: DRUGHIGH

## 2018-01-01 RX ORDER — FORMOTEROL FUMARATE 20 UG/2ML
20 SOLUTION RESPIRATORY (INHALATION) 2 TIMES DAILY
DISCHARGE
Start: 2018-01-01

## 2018-01-01 RX ORDER — BUMETANIDE 2 MG/1
2 TABLET ORAL 2 TIMES DAILY
Qty: 120 TABLET | Refills: 0 | Status: CANCELLED | OUTPATIENT
Start: 2018-01-01

## 2018-01-01 RX ORDER — MORPHINE SULFATE 2 MG/ML
2 INJECTION, SOLUTION INTRAMUSCULAR; INTRAVENOUS EVERY 4 HOURS PRN
Status: DISCONTINUED | OUTPATIENT
Start: 2018-01-01 | End: 2018-01-01 | Stop reason: HOSPADM

## 2018-01-01 RX ORDER — OMEPRAZOLE 20 MG/1
20 CAPSULE, DELAYED RELEASE ORAL DAILY
Qty: 90 CAPSULE | Refills: 0 | OUTPATIENT
Start: 2018-01-01

## 2018-01-01 RX ORDER — POTASSIUM CHLORIDE 20 MEQ/1
20 TABLET, EXTENDED RELEASE ORAL DAILY
Qty: 60 TABLET | Refills: 3 | Status: SHIPPED | OUTPATIENT
Start: 2018-01-01 | End: 2019-01-01 | Stop reason: SDUPTHER

## 2018-01-01 RX ORDER — POTASSIUM CHLORIDE 1.5 G/1.77G
40 POWDER, FOR SOLUTION ORAL 3 TIMES DAILY
Status: DISCONTINUED | OUTPATIENT
Start: 2018-01-01 | End: 2018-01-01 | Stop reason: HOSPADM

## 2018-01-01 RX ORDER — METRONIDAZOLE 500 MG/1
500 TABLET ORAL EVERY 8 HOURS SCHEDULED
Status: DISCONTINUED | OUTPATIENT
Start: 2018-01-01 | End: 2018-01-01 | Stop reason: HOSPADM

## 2018-01-01 RX ORDER — SODIUM CHLORIDE 0.9 % (FLUSH) 0.9 %
10 SYRINGE (ML) INJECTION PRN
Status: DISCONTINUED | OUTPATIENT
Start: 2018-01-01 | End: 2018-01-01 | Stop reason: HOSPADM

## 2018-01-01 RX ORDER — BUMETANIDE 1 MG/1
2 TABLET ORAL DAILY
Status: DISCONTINUED | OUTPATIENT
Start: 2018-01-01 | End: 2018-01-01 | Stop reason: HOSPADM

## 2018-01-01 RX ORDER — PRAVASTATIN SODIUM 40 MG
40 TABLET ORAL DAILY
Qty: 30 TABLET | Refills: 3 | DISCHARGE
Start: 2018-01-01 | End: 2018-01-01 | Stop reason: ALTCHOICE

## 2018-01-01 RX ORDER — ACETAMINOPHEN 325 MG/1
650 TABLET ORAL EVERY 4 HOURS PRN
Qty: 120 TABLET | Refills: 3 | DISCHARGE
Start: 2018-01-01

## 2018-01-01 RX ORDER — ONDANSETRON 2 MG/ML
4 INJECTION INTRAMUSCULAR; INTRAVENOUS EVERY 6 HOURS PRN
Status: DISCONTINUED | OUTPATIENT
Start: 2018-01-01 | End: 2018-01-01

## 2018-01-01 RX ORDER — CIPROFLOXACIN 500 MG/1
500 TABLET, FILM COATED ORAL EVERY 12 HOURS SCHEDULED
Status: DISCONTINUED | OUTPATIENT
Start: 2018-01-01 | End: 2018-01-01 | Stop reason: HOSPADM

## 2018-01-01 RX ORDER — MORPHINE SULFATE 4 MG/ML
4 INJECTION, SOLUTION INTRAMUSCULAR; INTRAVENOUS
Status: DISCONTINUED | OUTPATIENT
Start: 2018-01-01 | End: 2018-01-01 | Stop reason: SDUPTHER

## 2018-01-01 RX ORDER — CALCIUM CITRATE 1040MG
1 TABLET ORAL DAILY
Qty: 90 TABLET | Refills: 1 | Status: ON HOLD | OUTPATIENT
Start: 2018-01-01 | End: 2018-01-01 | Stop reason: HOSPADM

## 2018-01-01 RX ORDER — MORPHINE SULFATE 2 MG/ML
2 INJECTION, SOLUTION INTRAMUSCULAR; INTRAVENOUS
Status: DISCONTINUED | OUTPATIENT
Start: 2018-01-01 | End: 2018-01-01

## 2018-01-01 RX ORDER — FERROUS SULFATE 325(65) MG
325 TABLET ORAL
Qty: 30 TABLET | Refills: 3 | DISCHARGE
Start: 2018-01-01 | End: 2018-01-01 | Stop reason: SDUPTHER

## 2018-01-01 RX ORDER — BUDESONIDE 0.5 MG/2ML
0.5 INHALANT ORAL 2 TIMES DAILY
Qty: 60 AMPULE | Refills: 3 | Status: SHIPPED | OUTPATIENT
Start: 2018-01-01

## 2018-01-01 RX ORDER — SERTRALINE HYDROCHLORIDE 100 MG/1
100 TABLET, FILM COATED ORAL NIGHTLY
Status: DISCONTINUED | OUTPATIENT
Start: 2018-01-01 | End: 2018-01-01

## 2018-01-01 RX ORDER — ONDANSETRON 2 MG/ML
4 INJECTION INTRAMUSCULAR; INTRAVENOUS EVERY 6 HOURS PRN
Status: DISCONTINUED | OUTPATIENT
Start: 2018-01-01 | End: 2018-01-01 | Stop reason: HOSPADM

## 2018-01-01 RX ORDER — BUMETANIDE 1 MG/1
2 TABLET ORAL EVERY OTHER DAY
Status: DISCONTINUED | OUTPATIENT
Start: 2018-01-01 | End: 2018-01-01 | Stop reason: HOSPADM

## 2018-01-01 RX ORDER — MORPHINE SULFATE 4 MG/ML
4 INJECTION, SOLUTION INTRAMUSCULAR; INTRAVENOUS
Status: DISCONTINUED | OUTPATIENT
Start: 2018-01-01 | End: 2018-01-01 | Stop reason: HOSPADM

## 2018-01-01 RX ORDER — LEVOFLOXACIN 500 MG/1
500 TABLET, FILM COATED ORAL DAILY
Qty: 7 TABLET | Refills: 0 | Status: SHIPPED | OUTPATIENT
Start: 2018-01-01 | End: 2018-01-01

## 2018-01-01 RX ORDER — POTASSIUM CHLORIDE 20 MEQ/1
20 TABLET, EXTENDED RELEASE ORAL DAILY
Qty: 90 TABLET | Refills: 0 | Status: SHIPPED | OUTPATIENT
Start: 2018-01-01 | End: 2018-01-01 | Stop reason: SDUPTHER

## 2018-01-01 RX ORDER — ONDANSETRON 2 MG/ML
INJECTION INTRAMUSCULAR; INTRAVENOUS
Status: DISPENSED
Start: 2018-01-01 | End: 2018-01-01

## 2018-01-01 RX ORDER — GABAPENTIN 300 MG/1
600 CAPSULE ORAL 2 TIMES DAILY
Status: DISCONTINUED | OUTPATIENT
Start: 2018-01-01 | End: 2018-01-01

## 2018-01-01 RX ORDER — ROPINIROLE 1 MG/1
1 TABLET, FILM COATED ORAL NIGHTLY
Qty: 90 TABLET | Refills: 3 | DISCHARGE
Start: 2018-01-01 | End: 2018-01-01 | Stop reason: SDUPTHER

## 2018-01-01 RX ORDER — SODIUM CHLORIDE 0.9 % (FLUSH) 0.9 %
10 SYRINGE (ML) INJECTION PRN
Status: DISCONTINUED | OUTPATIENT
Start: 2018-01-01 | End: 2018-01-01 | Stop reason: SDUPTHER

## 2018-01-01 RX ORDER — FAMOTIDINE 20 MG/1
20 TABLET, FILM COATED ORAL 2 TIMES DAILY
Qty: 60 TABLET | Refills: 0 | Status: SHIPPED | OUTPATIENT
Start: 2018-01-01 | End: 2019-01-01 | Stop reason: SDUPTHER

## 2018-01-01 RX ORDER — KETOROLAC TROMETHAMINE 15 MG/ML
15 INJECTION, SOLUTION INTRAMUSCULAR; INTRAVENOUS ONCE
Status: COMPLETED | OUTPATIENT
Start: 2018-01-01 | End: 2018-01-01

## 2018-01-01 RX ORDER — CHOLECALCIFEROL (VITAMIN D3) 125 MCG
5 CAPSULE ORAL NIGHTLY
Status: ON HOLD | COMMUNITY
End: 2018-01-01 | Stop reason: HOSPADM

## 2018-01-01 RX ORDER — CIPROFLOXACIN 2 MG/ML
400 INJECTION, SOLUTION INTRAVENOUS EVERY 12 HOURS
Status: DISCONTINUED | OUTPATIENT
Start: 2018-01-01 | End: 2018-01-01

## 2018-01-01 RX ORDER — SODIUM CHLORIDE 0.9 % (FLUSH) 0.9 %
10 SYRINGE (ML) INJECTION EVERY 12 HOURS SCHEDULED
Status: DISCONTINUED | OUTPATIENT
Start: 2018-01-01 | End: 2018-01-01

## 2018-01-01 RX ORDER — ERGOCALCIFEROL 1.25 MG/1
50000 CAPSULE ORAL WEEKLY
Qty: 4 CAPSULE | Refills: 0 | Status: SHIPPED | OUTPATIENT
Start: 2018-01-01 | End: 2018-01-01 | Stop reason: SDUPTHER

## 2018-01-01 RX ORDER — QUETIAPINE FUMARATE 100 MG/1
200 TABLET, FILM COATED ORAL NIGHTLY
Status: DISCONTINUED | OUTPATIENT
Start: 2018-01-01 | End: 2018-01-01

## 2018-01-01 RX ORDER — POTASSIUM CHLORIDE 20 MEQ/1
40 TABLET, EXTENDED RELEASE ORAL ONCE
Status: COMPLETED | OUTPATIENT
Start: 2018-01-01 | End: 2018-01-01

## 2018-01-01 RX ADMIN — BUDESONIDE 500 MCG: 0.5 INHALANT RESPIRATORY (INHALATION) at 06:52

## 2018-01-01 RX ADMIN — MAGNESIUM SULFATE HEPTAHYDRATE 2 G: 40 INJECTION, SOLUTION INTRAVENOUS at 10:56

## 2018-01-01 RX ADMIN — QUETIAPINE FUMARATE 200 MG: 100 TABLET ORAL at 21:13

## 2018-01-01 RX ADMIN — PANTOPRAZOLE SODIUM 40 MG: 40 TABLET, DELAYED RELEASE ORAL at 08:23

## 2018-01-01 RX ADMIN — CLOTRIMAZOLE 10 MG: 10 LOZENGE ORAL at 09:57

## 2018-01-01 RX ADMIN — CIPROFLOXACIN 500 MG: 500 TABLET, FILM COATED ORAL at 21:13

## 2018-01-01 RX ADMIN — SODIUM CHLORIDE, POTASSIUM CHLORIDE, SODIUM LACTATE AND CALCIUM CHLORIDE: 600; 310; 30; 20 INJECTION, SOLUTION INTRAVENOUS at 03:40

## 2018-01-01 RX ADMIN — METHYLPREDNISOLONE SODIUM SUCCINATE 40 MG: 40 INJECTION, POWDER, FOR SOLUTION INTRAMUSCULAR; INTRAVENOUS at 00:28

## 2018-01-01 RX ADMIN — SODIUM CHLORIDE: 9 INJECTION, SOLUTION INTRAVENOUS at 12:36

## 2018-01-01 RX ADMIN — TRIMETHOBENZAMIDE HYDROCHLORIDE 200 MG: 100 INJECTION INTRAMUSCULAR at 16:59

## 2018-01-01 RX ADMIN — IPRATROPIUM BROMIDE AND ALBUTEROL SULFATE 1 AMPULE: .5; 3 SOLUTION RESPIRATORY (INHALATION) at 10:25

## 2018-01-01 RX ADMIN — ROPINIROLE HYDROCHLORIDE 1 MG: 1 TABLET, FILM COATED ORAL at 22:14

## 2018-01-01 RX ADMIN — CIPROFLOXACIN 400 MG: 2 INJECTION, SOLUTION INTRAVENOUS at 13:05

## 2018-01-01 RX ADMIN — SODIUM CHLORIDE, POTASSIUM CHLORIDE, SODIUM LACTATE AND CALCIUM CHLORIDE: 600; 310; 30; 20 INJECTION, SOLUTION INTRAVENOUS at 12:19

## 2018-01-01 RX ADMIN — METFORMIN HYDROCHLORIDE 1000 MG: 1000 TABLET, FILM COATED ORAL at 16:52

## 2018-01-01 RX ADMIN — IPRATROPIUM BROMIDE AND ALBUTEROL SULFATE 1 AMPULE: .5; 3 SOLUTION RESPIRATORY (INHALATION) at 09:37

## 2018-01-01 RX ADMIN — PRAVASTATIN SODIUM 40 MG: 20 TABLET ORAL at 21:12

## 2018-01-01 RX ADMIN — METHYLPREDNISOLONE SODIUM SUCCINATE 40 MG: 40 INJECTION, POWDER, FOR SOLUTION INTRAMUSCULAR; INTRAVENOUS at 16:48

## 2018-01-01 RX ADMIN — IOPAMIDOL 80 ML: 755 INJECTION, SOLUTION INTRAVENOUS at 01:23

## 2018-01-01 RX ADMIN — METFORMIN HYDROCHLORIDE 1000 MG: 1000 TABLET, FILM COATED ORAL at 17:17

## 2018-01-01 RX ADMIN — IOPAMIDOL 80 ML: 755 INJECTION, SOLUTION INTRAVENOUS at 14:00

## 2018-01-01 RX ADMIN — IPRATROPIUM BROMIDE AND ALBUTEROL SULFATE 1 AMPULE: .5; 3 SOLUTION RESPIRATORY (INHALATION) at 17:53

## 2018-01-01 RX ADMIN — INSULIN LISPRO 4 UNITS: 100 INJECTION, SOLUTION INTRAVENOUS; SUBCUTANEOUS at 17:16

## 2018-01-01 RX ADMIN — FOLIC ACID 1 MG: 1 TABLET ORAL at 09:23

## 2018-01-01 RX ADMIN — INSULIN LISPRO 4 UNITS: 100 INJECTION, SOLUTION INTRAVENOUS; SUBCUTANEOUS at 08:59

## 2018-01-01 RX ADMIN — IOPAMIDOL 80 ML: 755 INJECTION, SOLUTION INTRAVENOUS at 10:47

## 2018-01-01 RX ADMIN — QUETIAPINE FUMARATE 200 MG: 100 TABLET ORAL at 21:07

## 2018-01-01 RX ADMIN — ROFLUMILAST 500 MCG: 500 TABLET ORAL at 14:00

## 2018-01-01 RX ADMIN — GABAPENTIN 300 MG: 300 CAPSULE ORAL at 20:35

## 2018-01-01 RX ADMIN — INSULIN LISPRO 2 UNITS: 100 INJECTION, SOLUTION INTRAVENOUS; SUBCUTANEOUS at 17:11

## 2018-01-01 RX ADMIN — FOLIC ACID 1 MG: 1 TABLET ORAL at 08:52

## 2018-01-01 RX ADMIN — Medication 10 ML: at 08:11

## 2018-01-01 RX ADMIN — MORPHINE SULFATE 4 MG: 4 INJECTION INTRAVENOUS at 09:24

## 2018-01-01 RX ADMIN — TRIMETHOBENZAMIDE HYDROCHLORIDE 200 MG: 100 INJECTION INTRAMUSCULAR at 00:50

## 2018-01-01 RX ADMIN — POTASSIUM CHLORIDE 20 MEQ: 1500 TABLET, EXTENDED RELEASE ORAL at 09:52

## 2018-01-01 RX ADMIN — Medication 10 ML: at 20:45

## 2018-01-01 RX ADMIN — METRONIDAZOLE 500 MG: 500 TABLET ORAL at 21:43

## 2018-01-01 RX ADMIN — FORMOTEROL FUMARATE DIHYDRATE 20 MCG: 20 SOLUTION RESPIRATORY (INHALATION) at 17:32

## 2018-01-01 RX ADMIN — IPRATROPIUM BROMIDE AND ALBUTEROL SULFATE 1 AMPULE: .5; 3 SOLUTION RESPIRATORY (INHALATION) at 02:07

## 2018-01-01 RX ADMIN — ACETAMINOPHEN 650 MG: 325 TABLET, FILM COATED ORAL at 06:13

## 2018-01-01 RX ADMIN — METHYLPREDNISOLONE SODIUM SUCCINATE 40 MG: 40 INJECTION, POWDER, FOR SOLUTION INTRAMUSCULAR; INTRAVENOUS at 12:15

## 2018-01-01 RX ADMIN — MORPHINE SULFATE 4 MG: 4 INJECTION INTRAVENOUS at 12:15

## 2018-01-01 RX ADMIN — IPRATROPIUM BROMIDE AND ALBUTEROL SULFATE 1 AMPULE: .5; 3 SOLUTION RESPIRATORY (INHALATION) at 13:40

## 2018-01-01 RX ADMIN — Medication 10 ML: at 22:13

## 2018-01-01 RX ADMIN — MORPHINE SULFATE 4 MG: 4 INJECTION INTRAVENOUS at 08:35

## 2018-01-01 RX ADMIN — LEVOFLOXACIN 500 MG: 500 TABLET, FILM COATED ORAL at 09:36

## 2018-01-01 RX ADMIN — FOLIC ACID 1 MG: 1 TABLET ORAL at 12:02

## 2018-01-01 RX ADMIN — INSULIN LISPRO 6 UNITS: 100 INJECTION, SOLUTION INTRAVENOUS; SUBCUTANEOUS at 16:52

## 2018-01-01 RX ADMIN — METHYLPREDNISOLONE SODIUM SUCCINATE 40 MG: 40 INJECTION, POWDER, FOR SOLUTION INTRAMUSCULAR; INTRAVENOUS at 00:55

## 2018-01-01 RX ADMIN — BUMETANIDE 2 MG: 1 TABLET ORAL at 09:57

## 2018-01-01 RX ADMIN — IPRATROPIUM BROMIDE AND ALBUTEROL SULFATE 1 AMPULE: .5; 3 SOLUTION RESPIRATORY (INHALATION) at 06:36

## 2018-01-01 RX ADMIN — METRONIDAZOLE 500 MG: 500 INJECTION, SOLUTION INTRAVENOUS at 21:00

## 2018-01-01 RX ADMIN — BUMETANIDE 2 MG: 1 TABLET ORAL at 08:59

## 2018-01-01 RX ADMIN — INSULIN LISPRO 6 UNITS: 100 INJECTION, SOLUTION INTRAVENOUS; SUBCUTANEOUS at 09:58

## 2018-01-01 RX ADMIN — ACETAMINOPHEN 650 MG: 325 TABLET, FILM COATED ORAL at 11:03

## 2018-01-01 RX ADMIN — ROPINIROLE HYDROCHLORIDE 1 MG: 1 TABLET, FILM COATED ORAL at 20:50

## 2018-01-01 RX ADMIN — METHYLPREDNISOLONE SODIUM SUCCINATE 40 MG: 40 INJECTION, POWDER, FOR SOLUTION INTRAMUSCULAR; INTRAVENOUS at 00:57

## 2018-01-01 RX ADMIN — FORMOTEROL FUMARATE DIHYDRATE 20 MCG: 20 SOLUTION RESPIRATORY (INHALATION) at 06:09

## 2018-01-01 RX ADMIN — MORPHINE SULFATE 2 MG: 2 INJECTION, SOLUTION INTRAMUSCULAR; INTRAVENOUS at 00:50

## 2018-01-01 RX ADMIN — ROPINIROLE HYDROCHLORIDE 1 MG: 1 TABLET, FILM COATED ORAL at 21:13

## 2018-01-01 RX ADMIN — MORPHINE SULFATE 4 MG: 4 INJECTION INTRAVENOUS at 22:14

## 2018-01-01 RX ADMIN — POTASSIUM CHLORIDE 10 MEQ: 7.46 INJECTION, SOLUTION INTRAVENOUS at 13:57

## 2018-01-01 RX ADMIN — ENOXAPARIN SODIUM 50 MG: 60 INJECTION SUBCUTANEOUS at 08:52

## 2018-01-01 RX ADMIN — SERTRALINE 100 MG: 100 TABLET, FILM COATED ORAL at 00:12

## 2018-01-01 RX ADMIN — METFORMIN HYDROCHLORIDE 1000 MG: 1000 TABLET, FILM COATED ORAL at 12:18

## 2018-01-01 RX ADMIN — METRONIDAZOLE 500 MG: 500 TABLET ORAL at 06:06

## 2018-01-01 RX ADMIN — METFORMIN HYDROCHLORIDE 1000 MG: 1000 TABLET, FILM COATED ORAL at 16:25

## 2018-01-01 RX ADMIN — ROPINIROLE HYDROCHLORIDE 1 MG: 1 TABLET, FILM COATED ORAL at 21:43

## 2018-01-01 RX ADMIN — ENOXAPARIN SODIUM 40 MG: 40 INJECTION SUBCUTANEOUS at 09:03

## 2018-01-01 RX ADMIN — FORMOTEROL FUMARATE DIHYDRATE 20 MCG: 20 SOLUTION RESPIRATORY (INHALATION) at 06:26

## 2018-01-01 RX ADMIN — BUDESONIDE 500 MCG: 0.5 INHALANT RESPIRATORY (INHALATION) at 17:35

## 2018-01-01 RX ADMIN — PANTOPRAZOLE SODIUM 40 MG: 40 INJECTION, POWDER, FOR SOLUTION INTRAVENOUS at 08:38

## 2018-01-01 RX ADMIN — FORMOTEROL FUMARATE DIHYDRATE 20 MCG: 20 SOLUTION RESPIRATORY (INHALATION) at 17:35

## 2018-01-01 RX ADMIN — SODIUM CHLORIDE 1000 ML: 900 INJECTION, SOLUTION INTRAVENOUS at 09:08

## 2018-01-01 RX ADMIN — METRONIDAZOLE 500 MG: 500 TABLET ORAL at 22:27

## 2018-01-01 RX ADMIN — IPRATROPIUM BROMIDE AND ALBUTEROL SULFATE 1 AMPULE: .5; 3 SOLUTION RESPIRATORY (INHALATION) at 09:52

## 2018-01-01 RX ADMIN — GABAPENTIN 300 MG: 300 CAPSULE ORAL at 08:52

## 2018-01-01 RX ADMIN — GABAPENTIN 300 MG: 300 CAPSULE ORAL at 12:20

## 2018-01-01 RX ADMIN — ENOXAPARIN SODIUM 50 MG: 60 INJECTION SUBCUTANEOUS at 21:08

## 2018-01-01 RX ADMIN — INSULIN LISPRO 2 UNITS: 100 INJECTION, SOLUTION INTRAVENOUS; SUBCUTANEOUS at 17:43

## 2018-01-01 RX ADMIN — FORMOTEROL FUMARATE DIHYDRATE 20 MCG: 20 SOLUTION RESPIRATORY (INHALATION) at 06:52

## 2018-01-01 RX ADMIN — FORMOTEROL FUMARATE DIHYDRATE 20 MCG: 20 SOLUTION RESPIRATORY (INHALATION) at 18:17

## 2018-01-01 RX ADMIN — METRONIDAZOLE 500 MG: 500 TABLET ORAL at 08:22

## 2018-01-01 RX ADMIN — METRONIDAZOLE 500 MG: 500 INJECTION, SOLUTION INTRAVENOUS at 20:30

## 2018-01-01 RX ADMIN — FENTANYL CITRATE 25 MCG: 50 INJECTION INTRAMUSCULAR; INTRAVENOUS at 07:51

## 2018-01-01 RX ADMIN — SERTRALINE 100 MG: 100 TABLET, FILM COATED ORAL at 21:13

## 2018-01-01 RX ADMIN — MORPHINE SULFATE 2 MG: 2 INJECTION, SOLUTION INTRAMUSCULAR; INTRAVENOUS at 08:59

## 2018-01-01 RX ADMIN — CIPROFLOXACIN 500 MG: 500 TABLET, FILM COATED ORAL at 09:23

## 2018-01-01 RX ADMIN — MORPHINE SULFATE 4 MG: 4 INJECTION INTRAVENOUS at 19:53

## 2018-01-01 RX ADMIN — MORPHINE SULFATE 4 MG: 4 INJECTION INTRAVENOUS at 10:45

## 2018-01-01 RX ADMIN — MAGNESIUM HYDROXIDE 30 ML: 400 SUSPENSION ORAL at 14:19

## 2018-01-01 RX ADMIN — METFORMIN HYDROCHLORIDE 1000 MG: 1000 TABLET, FILM COATED ORAL at 08:59

## 2018-01-01 RX ADMIN — INSULIN LISPRO 1 UNITS: 100 INJECTION, SOLUTION INTRAVENOUS; SUBCUTANEOUS at 12:18

## 2018-01-01 RX ADMIN — BUDESONIDE 500 MCG: 0.5 INHALANT RESPIRATORY (INHALATION) at 18:17

## 2018-01-01 RX ADMIN — ROFLUMILAST 500 MCG: 500 TABLET ORAL at 08:59

## 2018-01-01 RX ADMIN — ALBUMIN (HUMAN) 25 G: 0.25 INJECTION, SOLUTION INTRAVENOUS at 20:36

## 2018-01-01 RX ADMIN — MORPHINE SULFATE 2 MG: 2 INJECTION, SOLUTION INTRAMUSCULAR; INTRAVENOUS at 04:54

## 2018-01-01 RX ADMIN — MORPHINE SULFATE 4 MG: 4 INJECTION INTRAVENOUS at 04:47

## 2018-01-01 RX ADMIN — MORPHINE SULFATE 4 MG: 4 INJECTION INTRAVENOUS at 22:13

## 2018-01-01 RX ADMIN — ENOXAPARIN SODIUM 50 MG: 60 INJECTION SUBCUTANEOUS at 20:51

## 2018-01-01 RX ADMIN — Medication 10 ML: at 14:00

## 2018-01-01 RX ADMIN — METHYLPREDNISOLONE SODIUM SUCCINATE 40 MG: 40 INJECTION, POWDER, FOR SOLUTION INTRAMUSCULAR; INTRAVENOUS at 01:37

## 2018-01-01 RX ADMIN — PANTOPRAZOLE SODIUM 40 MG: 40 TABLET, DELAYED RELEASE ORAL at 06:27

## 2018-01-01 RX ADMIN — ACETAMINOPHEN 650 MG: 325 TABLET, FILM COATED ORAL at 09:36

## 2018-01-01 RX ADMIN — ENOXAPARIN SODIUM 40 MG: 40 INJECTION SUBCUTANEOUS at 08:59

## 2018-01-01 RX ADMIN — ENOXAPARIN SODIUM 40 MG: 40 INJECTION SUBCUTANEOUS at 09:23

## 2018-01-01 RX ADMIN — QUETIAPINE FUMARATE 200 MG: 100 TABLET ORAL at 22:14

## 2018-01-01 RX ADMIN — METHYLPREDNISOLONE SODIUM SUCCINATE 40 MG: 40 INJECTION, POWDER, FOR SOLUTION INTRAMUSCULAR; INTRAVENOUS at 14:20

## 2018-01-01 RX ADMIN — METRONIDAZOLE 500 MG: 500 INJECTION, SOLUTION INTRAVENOUS at 04:41

## 2018-01-01 RX ADMIN — POTASSIUM CHLORIDE 40 MEQ: 1.5 POWDER, FOR SOLUTION ORAL at 09:29

## 2018-01-01 RX ADMIN — IOHEXOL 50 ML: 240 INJECTION, SOLUTION INTRATHECAL; INTRAVASCULAR; INTRAVENOUS; ORAL at 01:23

## 2018-01-01 RX ADMIN — IPRATROPIUM BROMIDE AND ALBUTEROL SULFATE 1 AMPULE: .5; 3 SOLUTION RESPIRATORY (INHALATION) at 09:29

## 2018-01-01 RX ADMIN — QUETIAPINE FUMARATE 200 MG: 100 TABLET ORAL at 20:35

## 2018-01-01 RX ADMIN — PANTOPRAZOLE SODIUM 40 MG: 40 INJECTION, POWDER, FOR SOLUTION INTRAVENOUS at 08:00

## 2018-01-01 RX ADMIN — IPRATROPIUM BROMIDE AND ALBUTEROL SULFATE 1 AMPULE: .5; 3 SOLUTION RESPIRATORY (INHALATION) at 07:02

## 2018-01-01 RX ADMIN — IPRATROPIUM BROMIDE AND ALBUTEROL SULFATE 1 AMPULE: .5; 3 SOLUTION RESPIRATORY (INHALATION) at 22:54

## 2018-01-01 RX ADMIN — Medication 10 ML: at 14:07

## 2018-01-01 RX ADMIN — Medication 10 ML: at 08:38

## 2018-01-01 RX ADMIN — FENTANYL CITRATE 25 MCG: 50 INJECTION, SOLUTION INTRAMUSCULAR; INTRAVENOUS at 23:01

## 2018-01-01 RX ADMIN — PRAVASTATIN SODIUM 40 MG: 20 TABLET ORAL at 22:13

## 2018-01-01 RX ADMIN — INSULIN LISPRO 3 UNITS: 100 INJECTION, SOLUTION INTRAVENOUS; SUBCUTANEOUS at 21:11

## 2018-01-01 RX ADMIN — PRAVASTATIN SODIUM 40 MG: 20 TABLET ORAL at 21:43

## 2018-01-01 RX ADMIN — PIPERACILLIN SODIUM AND TAZOBACTAM SODIUM 4.5 G: 4; .5 INJECTION, POWDER, LYOPHILIZED, FOR SOLUTION INTRAVENOUS at 10:22

## 2018-01-01 RX ADMIN — ATORVASTATIN CALCIUM 10 MG: 10 TABLET, FILM COATED ORAL at 20:35

## 2018-01-01 RX ADMIN — IPRATROPIUM BROMIDE AND ALBUTEROL SULFATE 1 AMPULE: .5; 3 SOLUTION RESPIRATORY (INHALATION) at 20:53

## 2018-01-01 RX ADMIN — METRONIDAZOLE 500 MG: 500 TABLET ORAL at 14:00

## 2018-01-01 RX ADMIN — DENOSUMAB 60 MG: 60 INJECTION SUBCUTANEOUS at 09:37

## 2018-01-01 RX ADMIN — SODIUM CHLORIDE: 9 INJECTION, SOLUTION INTRAVENOUS at 07:33

## 2018-01-01 RX ADMIN — INSULIN LISPRO 4 UNITS: 100 INJECTION, SOLUTION INTRAVENOUS; SUBCUTANEOUS at 21:06

## 2018-01-01 RX ADMIN — MORPHINE SULFATE 4 MG: 4 INJECTION INTRAVENOUS at 18:43

## 2018-01-01 RX ADMIN — QUETIAPINE FUMARATE 200 MG: 100 TABLET ORAL at 21:43

## 2018-01-01 RX ADMIN — FERROUS SULFATE TAB 325 MG (65 MG ELEMENTAL FE) 325 MG: 325 (65 FE) TAB at 09:52

## 2018-01-01 RX ADMIN — INSULIN LISPRO 2 UNITS: 100 INJECTION, SOLUTION INTRAVENOUS; SUBCUTANEOUS at 12:04

## 2018-01-01 RX ADMIN — AZITHROMYCIN MONOHYDRATE 500 MG: 500 INJECTION, POWDER, LYOPHILIZED, FOR SOLUTION INTRAVENOUS at 03:36

## 2018-01-01 RX ADMIN — BUDESONIDE 500 MCG: 0.5 INHALANT RESPIRATORY (INHALATION) at 17:18

## 2018-01-01 RX ADMIN — BUMETANIDE 2 MG: 1 TABLET ORAL at 08:01

## 2018-01-01 RX ADMIN — FUROSEMIDE 20 MG: 10 INJECTION, SOLUTION INTRAMUSCULAR; INTRAVENOUS at 05:41

## 2018-01-01 RX ADMIN — SERTRALINE HYDROCHLORIDE 100 MG: 100 TABLET ORAL at 21:19

## 2018-01-01 RX ADMIN — ENOXAPARIN SODIUM 40 MG: 40 INJECTION SUBCUTANEOUS at 09:57

## 2018-01-01 RX ADMIN — IPRATROPIUM BROMIDE AND ALBUTEROL SULFATE 1 AMPULE: .5; 3 SOLUTION RESPIRATORY (INHALATION) at 13:15

## 2018-01-01 RX ADMIN — INSULIN LISPRO 2 UNITS: 100 INJECTION, SOLUTION INTRAVENOUS; SUBCUTANEOUS at 12:39

## 2018-01-01 RX ADMIN — ENOXAPARIN SODIUM 40 MG: 40 INJECTION SUBCUTANEOUS at 12:22

## 2018-01-01 RX ADMIN — ROPINIROLE HYDROCHLORIDE 1 MG: 1 TABLET, FILM COATED ORAL at 21:08

## 2018-01-01 RX ADMIN — IPRATROPIUM BROMIDE AND ALBUTEROL SULFATE 1 AMPULE: .5; 3 SOLUTION RESPIRATORY (INHALATION) at 14:42

## 2018-01-01 RX ADMIN — PANTOPRAZOLE SODIUM 40 MG: 40 TABLET, DELAYED RELEASE ORAL at 06:19

## 2018-01-01 RX ADMIN — IPRATROPIUM BROMIDE AND ALBUTEROL SULFATE 1 AMPULE: .5; 3 SOLUTION RESPIRATORY (INHALATION) at 13:28

## 2018-01-01 RX ADMIN — METRONIDAZOLE 500 MG: 500 INJECTION, SOLUTION INTRAVENOUS at 04:33

## 2018-01-01 RX ADMIN — PANTOPRAZOLE SODIUM 40 MG: 40 INJECTION, POWDER, FOR SOLUTION INTRAVENOUS at 21:04

## 2018-01-01 RX ADMIN — INSULIN LISPRO 1 UNITS: 100 INJECTION, SOLUTION INTRAVENOUS; SUBCUTANEOUS at 20:32

## 2018-01-01 RX ADMIN — QUETIAPINE FUMARATE 200 MG: 100 TABLET ORAL at 00:13

## 2018-01-01 RX ADMIN — METFORMIN HYDROCHLORIDE 1000 MG: 1000 TABLET, FILM COATED ORAL at 09:57

## 2018-01-01 RX ADMIN — BUDESONIDE 500 MCG: 0.5 INHALANT RESPIRATORY (INHALATION) at 06:26

## 2018-01-01 RX ADMIN — MORPHINE SULFATE 2 MG: 2 INJECTION, SOLUTION INTRAMUSCULAR; INTRAVENOUS at 20:35

## 2018-01-01 RX ADMIN — METRONIDAZOLE 500 MG: 500 TABLET ORAL at 13:30

## 2018-01-01 RX ADMIN — SODIUM CHLORIDE 3 G: 900 INJECTION INTRAVENOUS at 15:36

## 2018-01-01 RX ADMIN — BUDESONIDE 500 MCG: 0.5 INHALANT RESPIRATORY (INHALATION) at 17:33

## 2018-01-01 RX ADMIN — INSULIN LISPRO 2 UNITS: 100 INJECTION, SOLUTION INTRAVENOUS; SUBCUTANEOUS at 08:16

## 2018-01-01 RX ADMIN — METRONIDAZOLE 500 MG: 500 INJECTION, SOLUTION INTRAVENOUS at 13:05

## 2018-01-01 RX ADMIN — PRAVASTATIN SODIUM 40 MG: 20 TABLET ORAL at 21:01

## 2018-01-01 RX ADMIN — WATER 2 G: 1 INJECTION, SOLUTION INTRAVENOUS at 03:10

## 2018-01-01 RX ADMIN — PANTOPRAZOLE SODIUM 40 MG: 40 INJECTION, POWDER, FOR SOLUTION INTRAVENOUS at 08:11

## 2018-01-01 RX ADMIN — POTASSIUM CHLORIDE 20 MEQ: 20 TABLET, EXTENDED RELEASE ORAL at 08:01

## 2018-01-01 RX ADMIN — IOHEXOL 50 ML: 240 INJECTION, SOLUTION INTRATHECAL; INTRAVASCULAR; INTRAVENOUS; ORAL at 13:26

## 2018-01-01 RX ADMIN — ALBUMIN (HUMAN) 25 G: 0.25 INJECTION, SOLUTION INTRAVENOUS at 09:29

## 2018-01-01 RX ADMIN — AZITHROMYCIN MONOHYDRATE 500 MG: 500 INJECTION, POWDER, LYOPHILIZED, FOR SOLUTION INTRAVENOUS at 03:20

## 2018-01-01 RX ADMIN — METFORMIN HYDROCHLORIDE 1000 MG: 1000 TABLET, FILM COATED ORAL at 09:35

## 2018-01-01 RX ADMIN — SERTRALINE HYDROCHLORIDE 100 MG: 100 TABLET ORAL at 20:35

## 2018-01-01 RX ADMIN — Medication 10 ML: at 21:43

## 2018-01-01 RX ADMIN — METRONIDAZOLE 500 MG: 500 TABLET ORAL at 14:20

## 2018-01-01 RX ADMIN — Medication 10 ML: at 09:53

## 2018-01-01 RX ADMIN — FENTANYL CITRATE 25 MCG: 50 INJECTION INTRAMUSCULAR; INTRAVENOUS at 08:28

## 2018-01-01 RX ADMIN — FOLIC ACID 1 MG: 1 TABLET ORAL at 09:36

## 2018-01-01 RX ADMIN — Medication 10 ML: at 09:23

## 2018-01-01 RX ADMIN — PANTOPRAZOLE SODIUM 40 MG: 40 INJECTION, POWDER, FOR SOLUTION INTRAVENOUS at 14:19

## 2018-01-01 RX ADMIN — MORPHINE SULFATE 4 MG: 4 INJECTION INTRAVENOUS at 13:06

## 2018-01-01 RX ADMIN — LEVOFLOXACIN 500 MG: 500 TABLET, FILM COATED ORAL at 15:44

## 2018-01-01 RX ADMIN — SODIUM CHLORIDE 1000 ML: 9 INJECTION, SOLUTION INTRAVENOUS at 13:37

## 2018-01-01 RX ADMIN — INSULIN LISPRO 2 UNITS: 100 INJECTION, SOLUTION INTRAVENOUS; SUBCUTANEOUS at 13:27

## 2018-01-01 RX ADMIN — PANTOPRAZOLE SODIUM 40 MG: 40 INJECTION, POWDER, FOR SOLUTION INTRAVENOUS at 09:03

## 2018-01-01 RX ADMIN — IOPAMIDOL 80 ML: 755 INJECTION, SOLUTION INTRAVENOUS at 09:36

## 2018-01-01 RX ADMIN — GABAPENTIN 300 MG: 300 CAPSULE ORAL at 21:08

## 2018-01-01 RX ADMIN — Medication 10 ML: at 21:08

## 2018-01-01 RX ADMIN — INSULIN LISPRO 2 UNITS: 100 INJECTION, SOLUTION INTRAVENOUS; SUBCUTANEOUS at 13:10

## 2018-01-01 RX ADMIN — FORMOTEROL FUMARATE DIHYDRATE 20 MCG: 20 SOLUTION RESPIRATORY (INHALATION) at 06:34

## 2018-01-01 RX ADMIN — BUDESONIDE 500 MCG: 0.5 INHALANT RESPIRATORY (INHALATION) at 05:06

## 2018-01-01 RX ADMIN — GABAPENTIN 300 MG: 300 CAPSULE ORAL at 09:36

## 2018-01-01 RX ADMIN — IPRATROPIUM BROMIDE AND ALBUTEROL SULFATE 1 AMPULE: .5; 3 SOLUTION RESPIRATORY (INHALATION) at 00:48

## 2018-01-01 RX ADMIN — MORPHINE SULFATE 4 MG: 4 INJECTION INTRAVENOUS at 20:32

## 2018-01-01 RX ADMIN — IPRATROPIUM BROMIDE AND ALBUTEROL SULFATE 1 AMPULE: .5; 3 SOLUTION RESPIRATORY (INHALATION) at 10:16

## 2018-01-01 RX ADMIN — MAGNESIUM SULFATE HEPTAHYDRATE 1 G: 1 INJECTION, SOLUTION INTRAVENOUS at 09:43

## 2018-01-01 RX ADMIN — FORMOTEROL FUMARATE DIHYDRATE 20 MCG: 20 SOLUTION RESPIRATORY (INHALATION) at 05:06

## 2018-01-01 RX ADMIN — ONDANSETRON 4 MG: 2 INJECTION INTRAMUSCULAR; INTRAVENOUS at 23:01

## 2018-01-01 RX ADMIN — INSULIN LISPRO 2 UNITS: 100 INJECTION, SOLUTION INTRAVENOUS; SUBCUTANEOUS at 09:24

## 2018-01-01 RX ADMIN — Medication 10 ML: at 07:33

## 2018-01-01 RX ADMIN — ROFLUMILAST 500 MCG: 500 TABLET ORAL at 09:53

## 2018-01-01 RX ADMIN — METRONIDAZOLE 500 MG: 500 TABLET ORAL at 06:19

## 2018-01-01 RX ADMIN — CIPROFLOXACIN 500 MG: 500 TABLET, FILM COATED ORAL at 21:01

## 2018-01-01 RX ADMIN — GABAPENTIN 300 MG: 300 CAPSULE ORAL at 08:01

## 2018-01-01 RX ADMIN — IPRATROPIUM BROMIDE AND ALBUTEROL SULFATE 1 AMPULE: .5; 3 SOLUTION RESPIRATORY (INHALATION) at 15:39

## 2018-01-01 RX ADMIN — SODIUM CHLORIDE 10 ML: 9 INJECTION, SOLUTION INTRAMUSCULAR; INTRAVENOUS; SUBCUTANEOUS at 09:09

## 2018-01-01 RX ADMIN — SODIUM CHLORIDE 1000 ML: 9 INJECTION, SOLUTION INTRAVENOUS at 11:53

## 2018-01-01 RX ADMIN — ACETAMINOPHEN 1000 MG: 500 TABLET, FILM COATED ORAL at 15:05

## 2018-01-01 RX ADMIN — ACETAMINOPHEN 650 MG: 325 TABLET, FILM COATED ORAL at 11:21

## 2018-01-01 RX ADMIN — MORPHINE SULFATE 4 MG: 4 INJECTION INTRAVENOUS at 22:50

## 2018-01-01 RX ADMIN — MORPHINE SULFATE 2 MG: 2 INJECTION, SOLUTION INTRAMUSCULAR; INTRAVENOUS at 15:52

## 2018-01-01 RX ADMIN — CIPROFLOXACIN 500 MG: 500 TABLET, FILM COATED ORAL at 08:59

## 2018-01-01 RX ADMIN — METFORMIN HYDROCHLORIDE 1000 MG: 1000 TABLET, FILM COATED ORAL at 16:47

## 2018-01-01 RX ADMIN — POTASSIUM CHLORIDE 40 MEQ: 1.5 POWDER, FOR SOLUTION ORAL at 22:13

## 2018-01-01 RX ADMIN — MORPHINE SULFATE 4 MG: 4 INJECTION INTRAVENOUS at 03:15

## 2018-01-01 RX ADMIN — KETOROLAC TROMETHAMINE 15 MG: 15 INJECTION, SOLUTION INTRAMUSCULAR; INTRAVENOUS at 11:54

## 2018-01-01 RX ADMIN — IPRATROPIUM BROMIDE AND ALBUTEROL SULFATE 1 AMPULE: .5; 3 SOLUTION RESPIRATORY (INHALATION) at 06:04

## 2018-01-01 RX ADMIN — IPRATROPIUM BROMIDE AND ALBUTEROL SULFATE 1 AMPULE: .5; 3 SOLUTION RESPIRATORY (INHALATION) at 10:13

## 2018-01-01 RX ADMIN — CIPROFLOXACIN 500 MG: 500 TABLET, FILM COATED ORAL at 21:43

## 2018-01-01 RX ADMIN — ROPINIROLE HYDROCHLORIDE 1 MG: 1 TABLET, FILM COATED ORAL at 20:35

## 2018-01-01 RX ADMIN — Medication 10 ML: at 21:04

## 2018-01-01 RX ADMIN — FORMOTEROL FUMARATE DIHYDRATE 20 MCG: 20 SOLUTION RESPIRATORY (INHALATION) at 17:33

## 2018-01-01 RX ADMIN — FERROUS SULFATE TAB 325 MG (65 MG ELEMENTAL FE) 325 MG: 325 (65 FE) TAB at 09:36

## 2018-01-01 RX ADMIN — METRONIDAZOLE 500 MG: 500 TABLET ORAL at 14:08

## 2018-01-01 RX ADMIN — ENOXAPARIN SODIUM 40 MG: 40 INJECTION SUBCUTANEOUS at 09:30

## 2018-01-01 RX ADMIN — BUMETANIDE 2 MG: 1 TABLET ORAL at 09:52

## 2018-01-01 RX ADMIN — CIPROFLOXACIN 400 MG: 2 INJECTION, SOLUTION INTRAVENOUS at 00:57

## 2018-01-01 RX ADMIN — SODIUM CHLORIDE 3 G: 900 INJECTION INTRAVENOUS at 21:07

## 2018-01-01 RX ADMIN — MORPHINE SULFATE 4 MG: 4 INJECTION INTRAVENOUS at 15:05

## 2018-01-01 RX ADMIN — FOLIC ACID 1 MG: 1 TABLET ORAL at 09:00

## 2018-01-01 RX ADMIN — BUDESONIDE 500 MCG: 0.5 INHALANT RESPIRATORY (INHALATION) at 06:09

## 2018-01-01 RX ADMIN — FUROSEMIDE 20 MG: 10 INJECTION, SOLUTION INTRAMUSCULAR; INTRAVENOUS at 21:03

## 2018-01-01 RX ADMIN — METHYLPREDNISOLONE SODIUM SUCCINATE 40 MG: 40 INJECTION, POWDER, FOR SOLUTION INTRAMUSCULAR; INTRAVENOUS at 14:07

## 2018-01-01 RX ADMIN — IPRATROPIUM BROMIDE AND ALBUTEROL SULFATE 1 AMPULE: .5; 3 SOLUTION RESPIRATORY (INHALATION) at 13:27

## 2018-01-01 RX ADMIN — POTASSIUM CHLORIDE 20 MEQ: 1500 TABLET, EXTENDED RELEASE ORAL at 17:17

## 2018-01-01 RX ADMIN — CIPROFLOXACIN 400 MG: 2 INJECTION, SOLUTION INTRAVENOUS at 03:15

## 2018-01-01 RX ADMIN — ROPINIROLE HYDROCHLORIDE 1 MG: 1 TABLET, FILM COATED ORAL at 00:13

## 2018-01-01 RX ADMIN — CIPROFLOXACIN 500 MG: 500 TABLET, FILM COATED ORAL at 09:30

## 2018-01-01 RX ADMIN — ATORVASTATIN CALCIUM 10 MG: 10 TABLET, FILM COATED ORAL at 21:07

## 2018-01-01 RX ADMIN — Medication 10 ML: at 20:30

## 2018-01-01 RX ADMIN — SODIUM CHLORIDE, POTASSIUM CHLORIDE, SODIUM LACTATE AND CALCIUM CHLORIDE: 600; 310; 30; 20 INJECTION, SOLUTION INTRAVENOUS at 13:13

## 2018-01-01 RX ADMIN — ALBUMIN (HUMAN) 25 G: 0.25 INJECTION, SOLUTION INTRAVENOUS at 02:44

## 2018-01-01 RX ADMIN — INSULIN LISPRO 4 UNITS: 100 INJECTION, SOLUTION INTRAVENOUS; SUBCUTANEOUS at 16:25

## 2018-01-01 RX ADMIN — METHYLPREDNISOLONE SODIUM SUCCINATE 40 MG: 40 INJECTION, POWDER, FOR SOLUTION INTRAMUSCULAR; INTRAVENOUS at 13:26

## 2018-01-01 RX ADMIN — FERROUS SULFATE TAB 325 MG (65 MG ELEMENTAL FE) 325 MG: 325 (65 FE) TAB at 08:52

## 2018-01-01 RX ADMIN — ATORVASTATIN CALCIUM 10 MG: 10 TABLET, FILM COATED ORAL at 20:51

## 2018-01-01 RX ADMIN — IPRATROPIUM BROMIDE AND ALBUTEROL SULFATE 1 AMPULE: .5; 3 SOLUTION RESPIRATORY (INHALATION) at 19:04

## 2018-01-01 RX ADMIN — ONDANSETRON 4 MG: 2 INJECTION INTRAMUSCULAR; INTRAVENOUS at 11:53

## 2018-01-01 RX ADMIN — PANTOPRAZOLE SODIUM 40 MG: 40 INJECTION, POWDER, FOR SOLUTION INTRAVENOUS at 08:52

## 2018-01-01 RX ADMIN — ENOXAPARIN SODIUM 40 MG: 40 INJECTION SUBCUTANEOUS at 08:11

## 2018-01-01 RX ADMIN — SODIUM CHLORIDE 3 G: 900 INJECTION INTRAVENOUS at 09:29

## 2018-01-01 RX ADMIN — METRONIDAZOLE 500 MG: 500 TABLET ORAL at 21:13

## 2018-01-01 RX ADMIN — MORPHINE SULFATE 4 MG: 4 INJECTION INTRAVENOUS at 13:54

## 2018-01-01 RX ADMIN — MORPHINE SULFATE 4 MG: 4 INJECTION INTRAVENOUS at 12:51

## 2018-01-01 RX ADMIN — IPRATROPIUM BROMIDE AND ALBUTEROL SULFATE 1 AMPULE: .5; 3 SOLUTION RESPIRATORY (INHALATION) at 22:01

## 2018-01-01 RX ADMIN — FORMOTEROL FUMARATE DIHYDRATE 20 MCG: 20 SOLUTION RESPIRATORY (INHALATION) at 17:18

## 2018-01-01 RX ADMIN — INSULIN LISPRO 2 UNITS: 100 INJECTION, SOLUTION INTRAVENOUS; SUBCUTANEOUS at 17:58

## 2018-01-01 RX ADMIN — MORPHINE SULFATE 2 MG: 2 INJECTION, SOLUTION INTRAMUSCULAR; INTRAVENOUS at 20:50

## 2018-01-01 RX ADMIN — FERROUS SULFATE TAB 325 MG (65 MG ELEMENTAL FE) 325 MG: 325 (65 FE) TAB at 09:03

## 2018-01-01 RX ADMIN — BUMETANIDE 2 MG: 1 TABLET ORAL at 09:35

## 2018-01-01 RX ADMIN — Medication 10 ML: at 10:03

## 2018-01-01 RX ADMIN — ONDANSETRON 4 MG: 2 INJECTION INTRAMUSCULAR; INTRAVENOUS at 11:56

## 2018-01-01 RX ADMIN — IOHEXOL 50 ML: 240 INJECTION, SOLUTION INTRATHECAL; INTRAVASCULAR; INTRAVENOUS; ORAL at 09:35

## 2018-01-01 RX ADMIN — INSULIN LISPRO 3 UNITS: 100 INJECTION, SOLUTION INTRAVENOUS; SUBCUTANEOUS at 22:12

## 2018-01-01 RX ADMIN — FERROUS SULFATE TAB 325 MG (65 MG ELEMENTAL FE) 325 MG: 325 (65 FE) TAB at 09:57

## 2018-01-01 RX ADMIN — SODIUM CHLORIDE 3 G: 900 INJECTION INTRAVENOUS at 20:52

## 2018-01-01 RX ADMIN — METRONIDAZOLE 500 MG: 500 TABLET ORAL at 14:30

## 2018-01-01 RX ADMIN — SODIUM CHLORIDE 3 G: 900 INJECTION INTRAVENOUS at 05:10

## 2018-01-01 RX ADMIN — FERROUS SULFATE TAB 325 MG (65 MG ELEMENTAL FE) 325 MG: 325 (65 FE) TAB at 09:00

## 2018-01-01 RX ADMIN — ENOXAPARIN SODIUM 50 MG: 60 INJECTION SUBCUTANEOUS at 08:01

## 2018-01-01 RX ADMIN — SODIUM CHLORIDE 500 ML: 9 INJECTION, SOLUTION INTRAVENOUS at 11:46

## 2018-01-01 RX ADMIN — MORPHINE SULFATE 4 MG: 4 INJECTION INTRAVENOUS at 16:48

## 2018-01-01 RX ADMIN — SERTRALINE HYDROCHLORIDE 100 MG: 100 TABLET ORAL at 20:50

## 2018-01-01 RX ADMIN — POTASSIUM CHLORIDE 40 MEQ: 1.5 POWDER, FOR SOLUTION ORAL at 13:51

## 2018-01-01 RX ADMIN — FOLIC ACID 1 MG: 1 TABLET ORAL at 09:52

## 2018-01-01 RX ADMIN — CIPROFLOXACIN 500 MG: 500 TABLET, FILM COATED ORAL at 22:14

## 2018-01-01 RX ADMIN — INSULIN LISPRO 6 UNITS: 100 INJECTION, SOLUTION INTRAVENOUS; SUBCUTANEOUS at 09:54

## 2018-01-01 RX ADMIN — ENOXAPARIN SODIUM 40 MG: 40 INJECTION SUBCUTANEOUS at 08:33

## 2018-01-01 RX ADMIN — METHYLPREDNISOLONE SODIUM SUCCINATE 40 MG: 40 INJECTION, POWDER, FOR SOLUTION INTRAMUSCULAR; INTRAVENOUS at 14:19

## 2018-01-01 RX ADMIN — FUROSEMIDE 40 MG: 10 INJECTION, SOLUTION INTRAMUSCULAR; INTRAVENOUS at 01:30

## 2018-01-01 RX ADMIN — METHYLPREDNISOLONE SODIUM SUCCINATE 40 MG: 40 INJECTION, POWDER, FOR SOLUTION INTRAMUSCULAR; INTRAVENOUS at 00:14

## 2018-01-01 RX ADMIN — PANTOPRAZOLE SODIUM 40 MG: 40 TABLET, DELAYED RELEASE ORAL at 06:06

## 2018-01-01 RX ADMIN — IPRATROPIUM BROMIDE AND ALBUTEROL SULFATE 1 AMPULE: .5; 3 SOLUTION RESPIRATORY (INHALATION) at 09:45

## 2018-01-01 RX ADMIN — MORPHINE SULFATE 4 MG: 4 INJECTION INTRAVENOUS at 09:58

## 2018-01-01 RX ADMIN — BUDESONIDE 500 MCG: 0.5 INHALANT RESPIRATORY (INHALATION) at 06:34

## 2018-01-01 RX ADMIN — FOLIC ACID 1 MG: 1 TABLET ORAL at 09:57

## 2018-01-01 RX ADMIN — INSULIN LISPRO 2 UNITS: 100 INJECTION, SOLUTION INTRAVENOUS; SUBCUTANEOUS at 18:21

## 2018-01-01 RX ADMIN — ENOXAPARIN SODIUM 40 MG: 40 INJECTION SUBCUTANEOUS at 09:36

## 2018-01-01 RX ADMIN — INSULIN LISPRO 1 UNITS: 100 INJECTION, SOLUTION INTRAVENOUS; SUBCUTANEOUS at 21:40

## 2018-01-01 RX ADMIN — MORPHINE SULFATE 4 MG: 4 INJECTION INTRAVENOUS at 21:51

## 2018-01-01 RX ADMIN — MORPHINE SULFATE 2 MG: 2 INJECTION, SOLUTION INTRAMUSCULAR; INTRAVENOUS at 18:21

## 2018-01-01 RX ADMIN — ALBUMIN (HUMAN) 25 G: 0.25 INJECTION, SOLUTION INTRAVENOUS at 15:45

## 2018-01-01 RX ADMIN — BUDESONIDE 500 MCG: 0.5 INHALANT RESPIRATORY (INHALATION) at 17:32

## 2018-01-01 RX ADMIN — MORPHINE SULFATE 4 MG: 4 INJECTION INTRAVENOUS at 20:45

## 2018-01-01 RX ADMIN — FOLIC ACID 1 MG: 1 TABLET ORAL at 09:03

## 2018-01-01 RX ADMIN — GABAPENTIN 300 MG: 300 CAPSULE ORAL at 20:50

## 2018-01-01 RX ADMIN — PANTOPRAZOLE SODIUM 40 MG: 40 INJECTION, POWDER, FOR SOLUTION INTRAVENOUS at 20:30

## 2018-01-01 RX ADMIN — FERROUS SULFATE TAB 325 MG (65 MG ELEMENTAL FE) 325 MG: 325 (65 FE) TAB at 08:01

## 2018-01-01 RX ADMIN — MAGNESIUM SULFATE HEPTAHYDRATE 2 G: 40 INJECTION, SOLUTION INTRAVENOUS at 15:45

## 2018-01-01 RX ADMIN — POTASSIUM CHLORIDE 40 MEQ: 1.5 POWDER, FOR SOLUTION ORAL at 15:44

## 2018-01-01 RX ADMIN — CLOTRIMAZOLE 10 MG: 10 LOZENGE ORAL at 14:07

## 2018-01-01 RX ADMIN — POTASSIUM CHLORIDE 40 MEQ: 1.5 POWDER, FOR SOLUTION ORAL at 09:35

## 2018-01-01 RX ADMIN — POTASSIUM CHLORIDE 40 MEQ: 20 TABLET, EXTENDED RELEASE ORAL at 13:57

## 2018-01-01 RX ADMIN — ACETAMINOPHEN 650 MG: 325 TABLET, FILM COATED ORAL at 17:17

## 2018-01-01 RX ADMIN — SERTRALINE 100 MG: 100 TABLET, FILM COATED ORAL at 22:14

## 2018-01-01 RX ADMIN — METRONIDAZOLE 500 MG: 500 TABLET ORAL at 22:24

## 2018-01-01 RX ADMIN — SODIUM CHLORIDE 3 G: 900 INJECTION INTRAVENOUS at 04:31

## 2018-01-01 RX ADMIN — METFORMIN HYDROCHLORIDE 1000 MG: 1000 TABLET, FILM COATED ORAL at 08:30

## 2018-01-01 RX ADMIN — FERROUS SULFATE TAB 325 MG (65 MG ELEMENTAL FE) 325 MG: 325 (65 FE) TAB at 09:23

## 2018-01-01 RX ADMIN — FORMOTEROL FUMARATE DIHYDRATE 20 MCG: 20 SOLUTION RESPIRATORY (INHALATION) at 05:01

## 2018-01-01 RX ADMIN — IPRATROPIUM BROMIDE AND ALBUTEROL SULFATE 1 AMPULE: .5; 3 SOLUTION RESPIRATORY (INHALATION) at 15:04

## 2018-01-01 RX ADMIN — QUETIAPINE FUMARATE 200 MG: 100 TABLET ORAL at 20:50

## 2018-01-01 RX ADMIN — ACETAMINOPHEN 650 MG: 325 TABLET, FILM COATED ORAL at 16:16

## 2018-01-01 RX ADMIN — SERTRALINE 100 MG: 100 TABLET, FILM COATED ORAL at 21:43

## 2018-01-01 RX ADMIN — IOPAMIDOL 80 ML: 755 INJECTION, SOLUTION INTRAVENOUS at 13:26

## 2018-01-01 RX ADMIN — Medication 10 ML: at 08:58

## 2018-01-01 RX ADMIN — IPRATROPIUM BROMIDE AND ALBUTEROL SULFATE 1 AMPULE: .5; 3 SOLUTION RESPIRATORY (INHALATION) at 09:49

## 2018-01-01 RX ADMIN — CIPROFLOXACIN 500 MG: 500 TABLET, FILM COATED ORAL at 09:57

## 2018-01-01 RX ADMIN — METFORMIN HYDROCHLORIDE 1000 MG: 1000 TABLET, FILM COATED ORAL at 17:53

## 2018-01-01 RX ADMIN — SODIUM CHLORIDE 3 G: 900 INJECTION INTRAVENOUS at 17:00

## 2018-01-01 RX ADMIN — POTASSIUM CHLORIDE 20 MEQ: 1500 TABLET, EXTENDED RELEASE ORAL at 10:02

## 2018-01-01 RX ADMIN — MORPHINE SULFATE 4 MG: 4 INJECTION INTRAVENOUS at 14:19

## 2018-01-01 RX ADMIN — METRONIDAZOLE 500 MG: 500 INJECTION, SOLUTION INTRAVENOUS at 12:32

## 2018-01-01 RX ADMIN — METRONIDAZOLE 500 MG: 500 TABLET ORAL at 06:27

## 2018-01-01 RX ADMIN — IPRATROPIUM BROMIDE AND ALBUTEROL SULFATE 1 AMPULE: .5; 3 SOLUTION RESPIRATORY (INHALATION) at 14:45

## 2018-01-01 RX ADMIN — METHYLPREDNISOLONE SODIUM SUCCINATE 40 MG: 40 INJECTION, POWDER, FOR SOLUTION INTRAMUSCULAR; INTRAVENOUS at 13:04

## 2018-01-01 RX ADMIN — BUDESONIDE 500 MCG: 0.5 INHALANT RESPIRATORY (INHALATION) at 05:01

## 2018-01-01 RX ADMIN — CIPROFLOXACIN 400 MG: 2 INJECTION, SOLUTION INTRAVENOUS at 14:19

## 2018-01-01 RX ADMIN — FOLIC ACID 1 MG: 1 TABLET ORAL at 08:01

## 2018-01-01 RX ADMIN — MORPHINE SULFATE 2 MG: 2 INJECTION, SOLUTION INTRAMUSCULAR; INTRAVENOUS at 08:52

## 2018-01-01 RX ADMIN — POTASSIUM CHLORIDE 40 MEQ: 1.5 POWDER, FOR SOLUTION ORAL at 09:23

## 2018-01-01 RX ADMIN — IPRATROPIUM BROMIDE AND ALBUTEROL SULFATE 1 AMPULE: .5; 3 SOLUTION RESPIRATORY (INHALATION) at 13:35

## 2018-01-01 RX ADMIN — SODIUM CHLORIDE 3 G: 900 INJECTION INTRAVENOUS at 09:45

## 2018-01-01 RX ADMIN — ROFLUMILAST 500 MCG: 500 TABLET ORAL at 09:57

## 2018-01-01 RX ADMIN — IPRATROPIUM BROMIDE AND ALBUTEROL SULFATE 1 AMPULE: .5; 3 SOLUTION RESPIRATORY (INHALATION) at 10:55

## 2018-01-01 RX ADMIN — INSULIN LISPRO 4 UNITS: 100 INJECTION, SOLUTION INTRAVENOUS; SUBCUTANEOUS at 12:24

## 2018-01-01 RX ADMIN — BUDESONIDE 500 MCG: 0.5 INHALANT RESPIRATORY (INHALATION) at 13:36

## 2018-01-01 RX ADMIN — METHYLPREDNISOLONE SODIUM SUCCINATE 40 MG: 40 INJECTION, POWDER, FOR SOLUTION INTRAMUSCULAR; INTRAVENOUS at 02:58

## 2018-01-01 ASSESSMENT — PAIN DESCRIPTION - LOCATION
LOCATION: ABDOMEN
LOCATION: LEG
LOCATION: ABDOMEN
LOCATION: LEG
LOCATION: ABDOMEN
LOCATION: LEG
LOCATION: LEG
LOCATION: ABDOMEN
LOCATION: LEG
LOCATION: LEG
LOCATION: ABDOMEN
LOCATION: LEG
LOCATION: LEG
LOCATION: ABDOMEN
LOCATION: ABDOMEN

## 2018-01-01 ASSESSMENT — PAIN SCALES - GENERAL
PAINLEVEL_OUTOF10: 0
PAINLEVEL_OUTOF10: 0
PAINLEVEL_OUTOF10: 10
PAINLEVEL_OUTOF10: 0
PAINLEVEL_OUTOF10: 8
PAINLEVEL_OUTOF10: 8
PAINLEVEL_OUTOF10: 0
PAINLEVEL_OUTOF10: 6
PAINLEVEL_OUTOF10: 0
PAINLEVEL_OUTOF10: 8
PAINLEVEL_OUTOF10: 7
PAINLEVEL_OUTOF10: 9
PAINLEVEL_OUTOF10: 0
PAINLEVEL_OUTOF10: 6
PAINLEVEL_OUTOF10: 6
PAINLEVEL_OUTOF10: 3
PAINLEVEL_OUTOF10: 0
PAINLEVEL_OUTOF10: 6
PAINLEVEL_OUTOF10: 0
PAINLEVEL_OUTOF10: 7
PAINLEVEL_OUTOF10: 0
PAINLEVEL_OUTOF10: 3
PAINLEVEL_OUTOF10: 0
PAINLEVEL_OUTOF10: 10
PAINLEVEL_OUTOF10: 7
PAINLEVEL_OUTOF10: 0
PAINLEVEL_OUTOF10: 0
PAINLEVEL_OUTOF10: 10
PAINLEVEL_OUTOF10: 10
PAINLEVEL_OUTOF10: 1
PAINLEVEL_OUTOF10: 7
PAINLEVEL_OUTOF10: 0
PAINLEVEL_OUTOF10: 0
PAINLEVEL_OUTOF10: 5
PAINLEVEL_OUTOF10: 0
PAINLEVEL_OUTOF10: 0
PAINLEVEL_OUTOF10: 5
PAINLEVEL_OUTOF10: 7
PAINLEVEL_OUTOF10: 8
PAINLEVEL_OUTOF10: 10
PAINLEVEL_OUTOF10: 10
PAINLEVEL_OUTOF10: 7
PAINLEVEL_OUTOF10: 8
PAINLEVEL_OUTOF10: 0
PAINLEVEL_OUTOF10: 8
PAINLEVEL_OUTOF10: 0
PAINLEVEL_OUTOF10: 3
PAINLEVEL_OUTOF10: 8
PAINLEVEL_OUTOF10: 0
PAINLEVEL_OUTOF10: 0
PAINLEVEL_OUTOF10: 6
PAINLEVEL_OUTOF10: 3
PAINLEVEL_OUTOF10: 8
PAINLEVEL_OUTOF10: 3
PAINLEVEL_OUTOF10: 0
PAINLEVEL_OUTOF10: 6
PAINLEVEL_OUTOF10: 4
PAINLEVEL_OUTOF10: 8
PAINLEVEL_OUTOF10: 7
PAINLEVEL_OUTOF10: 10
PAINLEVEL_OUTOF10: 10
PAINLEVEL_OUTOF10: 7
PAINLEVEL_OUTOF10: 7
PAINLEVEL_OUTOF10: 0
PAINLEVEL_OUTOF10: 4
PAINLEVEL_OUTOF10: 9
PAINLEVEL_OUTOF10: 6
PAINLEVEL_OUTOF10: 8
PAINLEVEL_OUTOF10: 7
PAINLEVEL_OUTOF10: 7
PAINLEVEL_OUTOF10: 10
PAINLEVEL_OUTOF10: 5
PAINLEVEL_OUTOF10: 0
PAINLEVEL_OUTOF10: 0
PAINLEVEL_OUTOF10: 10
PAINLEVEL_OUTOF10: 4
PAINLEVEL_OUTOF10: 7
PAINLEVEL_OUTOF10: 3
PAINLEVEL_OUTOF10: 7
PAINLEVEL_OUTOF10: 0
PAINLEVEL_OUTOF10: 0
PAINLEVEL_OUTOF10: 7
PAINLEVEL_OUTOF10: 0
PAINLEVEL_OUTOF10: 0

## 2018-01-01 ASSESSMENT — PAIN DESCRIPTION - PAIN TYPE
TYPE: ACUTE PAIN
TYPE: CHRONIC PAIN
TYPE: ACUTE PAIN
TYPE: CHRONIC PAIN
TYPE: ACUTE PAIN
TYPE: CHRONIC PAIN
TYPE: ACUTE PAIN

## 2018-01-01 ASSESSMENT — ENCOUNTER SYMPTOMS
WHEEZING: 0
WHEEZING: 0
ABDOMINAL DISTENTION: 0
EYE PAIN: 0
ABDOMINAL DISTENTION: 0
CONSTIPATION: 0
NAUSEA: 1
COLOR CHANGE: 0
SINUS PRESSURE: 0
PHOTOPHOBIA: 0
PHOTOPHOBIA: 0
SINUS PAIN: 0
CHOKING: 0
DIARRHEA: 0
CHEST TIGHTNESS: 0
CHEST TIGHTNESS: 0
SHORTNESS OF BREATH: 1
WHEEZING: 0
VOMITING: 1
EYE PAIN: 0
BLOOD IN STOOL: 0
EYE REDNESS: 0
VOMITING: 0
DIARRHEA: 0
BLOOD IN STOOL: 0
NAUSEA: 1
RHINORRHEA: 0
ABDOMINAL PAIN: 1
RHINORRHEA: 0
EYE DISCHARGE: 0
CONSTIPATION: 0
CONSTIPATION: 0
TROUBLE SWALLOWING: 0
VOICE CHANGE: 1
SORE THROAT: 0
NAUSEA: 1
ABDOMINAL DISTENTION: 0
ABDOMINAL PAIN: 1
FACIAL SWELLING: 0
DIARRHEA: 0
SHORTNESS OF BREATH: 0
EYE DISCHARGE: 0
RHINORRHEA: 0
RHINORRHEA: 0
SORE THROAT: 0
VOMITING: 1
PHOTOPHOBIA: 0
EYE PAIN: 0
ANAL BLEEDING: 1
SORE THROAT: 0
BACK PAIN: 0
SHORTNESS OF BREATH: 0
CHOKING: 0
ABDOMINAL DISTENTION: 0
BACK PAIN: 0
EYE DISCHARGE: 0
BACK PAIN: 0
VOMITING: 0
BLOOD IN STOOL: 0
DIARRHEA: 0
ABDOMINAL PAIN: 1
COUGH: 0
SHORTNESS OF BREATH: 1
CONSTIPATION: 0
TROUBLE SWALLOWING: 0
VOMITING: 1
SORE THROAT: 0
VOMITING: 0
PHOTOPHOBIA: 0
EYE PAIN: 0
CONSTIPATION: 0
BLOOD IN STOOL: 0
COUGH: 0
ABDOMINAL PAIN: 0
DIARRHEA: 0
SHORTNESS OF BREATH: 1
RHINORRHEA: 0
EYE REDNESS: 0
EYE DISCHARGE: 0
NAUSEA: 0
CHOKING: 0
ABDOMINAL PAIN: 1
COUGH: 0
BACK PAIN: 0
COUGH: 0
SHORTNESS OF BREATH: 1
EYE PAIN: 0
ABDOMINAL PAIN: 1
COUGH: 0
SINUS PRESSURE: 0
ABDOMINAL PAIN: 1
COUGH: 0
CHEST TIGHTNESS: 0
EYE DISCHARGE: 0
CHEST TIGHTNESS: 0
EYE PAIN: 0
BLOOD IN STOOL: 0
DIARRHEA: 0
COUGH: 0
CHEST TIGHTNESS: 0
CONSTIPATION: 0
WHEEZING: 0
NAUSEA: 0
RHINORRHEA: 0
DIARRHEA: 1
SHORTNESS OF BREATH: 0
BACK PAIN: 0
CHEST TIGHTNESS: 0
TROUBLE SWALLOWING: 1
ABDOMINAL DISTENTION: 0
BACK PAIN: 0
TROUBLE SWALLOWING: 0
EYE REDNESS: 0
ABDOMINAL DISTENTION: 0
ABDOMINAL PAIN: 1
CHOKING: 0
NAUSEA: 0
WHEEZING: 0
DIARRHEA: 0
SORE THROAT: 0
TROUBLE SWALLOWING: 0
WHEEZING: 0
CHOKING: 0
SHORTNESS OF BREATH: 0
NAUSEA: 1
EYE PAIN: 0
SORE THROAT: 0
SORE THROAT: 0
VOMITING: 1
VOMITING: 0
PHOTOPHOBIA: 0
PHOTOPHOBIA: 0
EYE DISCHARGE: 0
NAUSEA: 1

## 2018-01-01 ASSESSMENT — PAIN DESCRIPTION - DESCRIPTORS
DESCRIPTORS: ACHING;DISCOMFORT;TENDER
DESCRIPTORS: ACHING;CONSTANT;DISCOMFORT
DESCRIPTORS: SORE
DESCRIPTORS: TENDER;SORE;DISCOMFORT
DESCRIPTORS: ACHING;CONSTANT
DESCRIPTORS: PENETRATING;DISCOMFORT
DESCRIPTORS: SORE;TENDER;DISCOMFORT
DESCRIPTORS: ACHING;CONSTANT
DESCRIPTORS: ACHING;CONSTANT
DESCRIPTORS: ACHING;DISCOMFORT
DESCRIPTORS: ACHING;DISCOMFORT;TENDER
DESCRIPTORS: DISCOMFORT;TENDER
DESCRIPTORS: ACHING;DISCOMFORT;SORE
DESCRIPTORS: ACHING;DISCOMFORT;SORE
DESCRIPTORS: SORE
DESCRIPTORS: ACHING;DISCOMFORT;SORE
DESCRIPTORS: SORE;TENDER;DISCOMFORT
DESCRIPTORS: ACHING;CONSTANT

## 2018-01-01 ASSESSMENT — PAIN DESCRIPTION - ORIENTATION
ORIENTATION: MID
ORIENTATION: RIGHT;LEFT
ORIENTATION: MID
ORIENTATION: RIGHT;LEFT
ORIENTATION: MID
ORIENTATION: MID
ORIENTATION: RIGHT;LEFT
ORIENTATION: MID

## 2018-01-01 ASSESSMENT — PAIN DESCRIPTION - FREQUENCY
FREQUENCY: CONTINUOUS

## 2018-03-21 NOTE — PROGRESS NOTES
Subjective:  77 y.o. female with PMH of COPD and other co-morbidities who presents in office today to discuss CT abdomen-pelvis on 02/26/2018 for persistent left abdominal pain. She states for the past 3 weeks she has been experiencing constant left abdominal pain with associated nausea. Pain is a 7/10 and she describes it as throbbing and aching in nature. She has vomited twice due to nausea. She has reduced appetite but is still able to consume fluids. Notes that her voice has also been hoarse for the past couple weeks. She denies diarrhea or bloody stool. She last had EGD and colonoscopy back in 2015, which revealed multiple sessile polyps and gastric erosion, respectively. Also, discussed with patient the results of CT Chest on 03/16/2018 requested by Pulmonologist, Dr. Bridgette Berkowitz. Patient relates that she was informed of the results by [facility]' office and was advised to follow-up with them on 03/23/2018 to discuss the next step. Patient is oxygen-dependent. Review of Systems   Constitutional: Negative for activity change, appetite change, chills, fever and unexpected weight change. HENT: Positive for trouble swallowing and voice change. Negative for congestion, ear pain, hearing loss, rhinorrhea and sore throat. Eyes: Negative for photophobia, pain, discharge and visual disturbance. Respiratory: Positive for shortness of breath (chronic). Negative for cough, choking, chest tightness and wheezing. Cardiovascular: Negative for chest pain, palpitations and leg swelling. Gastrointestinal: Positive for abdominal pain, nausea and vomiting (2x). Negative for abdominal distention, blood in stool, constipation and diarrhea. Endocrine: Negative for cold intolerance, heat intolerance, polydipsia and polyuria. Genitourinary: Positive for decreased urine volume. Negative for dysuria, flank pain, frequency, hematuria and urgency.    Musculoskeletal: Negative for arthralgias, back pain, joint swelling, Chronic interstitial lung disease as well as groundglass pulmonary   opacities. 3. Chronic consolidation at the anteromedial aspect of the lingula and   right middle lobe. 4. Small noncalcified right upper lobe pulmonary nodules. 5. Chronic mediastinal changes as discussed. Assessment and Plan: Charlee Schroeder was seen today for results and abdominal pain. Diagnoses and all orders for this visit:    Pharyngoesophageal dysphagia  -     External Referral To Gastroenterology  -     ondansetron (ZOFRAN-ODT) 4 MG disintegrating tablet; Take 1 tablet by mouth every 8 hours as needed for Nausea or Vomiting    Encounter to discuss test results        -     CT abdomen-pelvis     Multiple lung nodules on CT        -     Patient is to follow-up with Dr. Nisa Christina, Pulmonology on 03/23/2018    Vascular calcification        -     Continue current treatment    Oligouria  -     URINALYSIS; Future    Chronic bronchitis, unspecified chronic bronchitis type ([facility] [address]        -    Follows with Pulmonology         Diabetes mellitus type 2, noninsulin dependent ([facility] [address]  -     CBC; Future  -     COMPREHENSIVE METABOLIC PANEL; Future         Return in about 4 weeks (around 4/18/2018) for Lung nodules and Dysphagia. .    Patient may come in sooner if needed for medical concerns. Patient advised to call at any time to cancel or re-schedule or for any questions/concerns. Patient was seen and discussed with attending physician, Dr. Bre Triana.     Maryjo Hankins DO PGY2 [facility]  03/21/18  2:14 PM

## 2018-03-28 NOTE — PROGRESS NOTES
[facility]    Attending Physician Statement:  Jen Barton DO    I have discussed the case, including pertinent history and exam findings with the resident. I agree with the assessment, plan and orders as documented by the resident. Patient here for routine follow up of medical problems. Remainder of medical problems as per resident note.

## 2018-06-13 PROBLEM — M85.859 OSTEOPENIA OF HIP: Status: ACTIVE | Noted: 2018-01-01

## 2018-06-14 PROBLEM — M85.859: Status: ACTIVE | Noted: 2018-01-01

## 2018-07-10 NOTE — TELEPHONE ENCOUNTER
ANURAGW received voice mail message from Ramu palencia [facility] that patient was approved for home care waiver.

## 2018-07-19 NOTE — TELEPHONE ENCOUNTER
Refills sent with the exception of Omeprazole. Patient is being evaluated by GI, Dr. Lawrence Palacio, and should request the medication from his office as it's possible that he made changes to her medications.

## 2018-08-15 NOTE — PROGRESS NOTES
[facility]    Attending Physician Statement:  Rylan Mendoza DO    I have discussed the case, including pertinent history and exam findings with the resident. I agree with the assessment, plan and orders as documented by the resident. Patient here for routine follow up of medical problems. Remainder of medical problems as per resident note.

## 2018-08-15 NOTE — PROGRESS NOTES
Subjective:  77 y.o. female smoker with PMH of Chronic Respiratory Failure, DM2-, and other co-morbidities who presents in office today to discuss labs, mammogram and EGD. Patient is oxygen-dependent on 4L O2 via NC. DM-2 (well-controlled)  HA1C is 6.2  Patient is complaining of leg discomfort bilaterally. Patient states that Vascular Surgeon informed her that the blood flow in her legs were normal. Patient was previously on Gabapentin 300 mg bid with no noticeable relief. Leukocytosis and Microcytic Anemia  Peripheral blood smear is currently pending. Patient's hemoglobin is trending down; now 9.4. Patient is down 24 lbs since January, 2018. Patient states that she is trying to eat smaller portions during the day. Patient is now a participant of a meal delivery program. Patient is managed by Dr. Betsy Tucker and was scheduled to complete EGD & colonoscopy in 1 month ago. Patient states that she is scheduled for EGD on 08/27/18. Chronic Resp Failure on Oxygen and Lung Nodules  Patient is managed by Dr. Gerry Goins. Repeat CT Chest in December, 2019. CT Chest on 06/15/2018  Impression   1. Stable solid 6 mm subpleural nodule within the left lower lobe,   unchanged compared to the previous study from 5/12/2015 and   statistically benign. 2. Stable 3 mm nodule within the right upper lobe. 3. Stable mildly prominent precarinal and subcarinal lymphadenopathy. 4. Redemonstration of patchy groundglass opacities throughout   bilateral lungs, likely related to chronic interstitial lung changes. Review of Systems   Constitutional: Positive for appetite change and unexpected weight change (down 24 lbs). Negative for activity change, chills and fever. HENT: Negative for congestion, ear pain, hearing loss, rhinorrhea, sore throat and trouble swallowing. Eyes: Negative for photophobia, pain, discharge and visual disturbance. Respiratory: Positive for shortness of breath (chronic ).  Negative for cough, choking, chest tightness and wheezing. Cardiovascular: Negative for chest pain, palpitations and leg swelling. Gastrointestinal: Negative for abdominal distention, abdominal pain, blood in stool, constipation, diarrhea, nausea and vomiting. Endocrine: Negative for cold intolerance, heat intolerance, polydipsia and polyuria. Genitourinary: Negative for decreased urine volume, dysuria, flank pain, frequency, hematuria and urgency. Musculoskeletal: Positive for myalgias. Negative for arthralgias, back pain, joint swelling, neck pain and neck stiffness. Skin: Negative for pallor, rash and wound. Neurological: Positive for numbness. Negative for dizziness, tremors, weakness, light-headedness and headaches. Hematological: Does not bruise/bleed easily. Psychiatric/Behavioral: Negative for confusion and suicidal ideas. The patient is not nervous/anxious. Objective:  Vitals:    08/15/18 1304   BP: 123/67   Pulse: 119   Temp: 98 °F (36.7 °C)   TempSrc: Oral   SpO2: 96%   Weight: 139 lb (63 kg)   Height: 5' 6\" (1.676 m)       Physical Exam   Constitutional: She is oriented to person, place, and time. She appears well-developed and well-nourished. She has a sickly appearance. No distress. Nasal cannula in place. HENT:   Head: Normocephalic and atraumatic. Right Ear: External ear normal.   Left Ear: External ear normal.   Nose: Nose normal.   Mouth/Throat: Oropharynx is clear and moist.   Eyes: Pupils are equal, round, and reactive to light. Conjunctivae and EOM are normal.   Neck: Normal range of motion. Neck supple. No thyromegaly present. Cardiovascular: Regular rhythm, normal heart sounds and intact distal pulses. Tachycardia present. Exam reveals no gallop and no friction rub. No murmur heard. Pulmonary/Chest: Effort normal and breath sounds normal. No respiratory distress. She has no wheezes. Abdominal: Soft. Bowel sounds are normal. She exhibits no distension and no mass.  There is disease [facility])        -    Managed by Dr. Eliza Hedrick deficiency anemia, unspecified iron deficiency anemia type  -     CBC WITH AUTO DIFFERENTIAL; Future  -     IRON AND TIBC; Future    Pain and swelling of right lower leg  -     bumetanide (BUMEX) 2 MG tablet; Take 1 tablet by mouth 2 times daily    Cigarette nicotine dependence with other nicotine-induced disorder        -    Discussed importance of tobacco cessation and concern for lung cancer    Weight loss, unintentional        -    Receives prepared dinners via meal delivery program        -    EGD on 08/27/2018    Restless legs syndrome  -     rOPINIRole (REQUIP) 1 MG tablet; Take 1 tablet by mouth nightly    Other diabetic neurological complication associated with type 2 diabetes mellitus ([facility] [address]  -     EMG; Future  -     gabapentin (NEURONTIN) 300 MG capsule; Take 2 capsules by mouth 2 times daily for 90 days. .    Anemia, unspecified type  -     ferrous sulfate 325 (65 Fe) MG tablet; Take 1 tablet by mouth daily (with breakfast)    Breast cancer screening  -     RANDALL DIGITAL SCREEN W CAD BILATERAL; Future         Return in about 6 weeks (around 9/26/2018) for Anemia, EGD . Joshua Ramirez Patient may come in sooner if needed for medical concerns. Patient advised to call at any time to cancel or re-schedule or for any questions/concerns. Patient was seen and discussed with attending physician, Dr. Beverley Fonseca.   Leonie Foote DO PGY2 [facility]  08/15/18  1:18 PM

## 2018-08-29 NOTE — TELEPHONE ENCOUNTER
Patient called the office stating that she was referred to Dr Josefa Quach for EMG. Patient was scheduled on 09/10/18 she is not taking any blood thinners she does not have ay electronic devices implanted, she was advised no lotion that day and to wear comfortable clothing/shorts.  Patient verbally understood and she stated that she has no questions at this time

## 2018-09-08 PROBLEM — K55.9 ISCHEMIC COLITIS (HCC): Status: ACTIVE | Noted: 2018-01-01

## 2018-09-08 NOTE — ED PROVIDER NOTES
Found to have leukocytosis with a 3.6 lactate, repeat lactate was ordered following IVF. She was given 1L IVF, she has a history of CHF. Patient also found to have ischemic colitis on CT scan re-read. Surgery consulted, I spoke with Dr. Lexis Allen 1010, he recommended treating patient with IVF, IV abx, and bowel rest with admission to medicine. He does not feel surgery is indicated at this time. Case discussed with family medicine resident at 56. Patient will be admitted. Patient given 4.5g of IV Zosyn in ED. ED Course as of Sep 08 1035   Sat Sep 08, 2018   0953 Pain still 10/10 patient given second dose of fentanyl. Condition unchanged   [SS]   1361 Patient pain improving, exam unchanged  [SS]      ED Course User Index  [SS] Vincenzo Ko MD           --------------------------------------------- PAST HISTORY ---------------------------------------------  Past Medical History:  has a past medical history of Arthritis; Asthma; Blood transfusion; CHF (congestive heart failure) ([facility] [address] COPD (chronic obstructive pulmonary disease) ([facility] [address] Diabetes mellitus ([facility] [address] GERD (gastroesophageal reflux disease); History of echocardiogram; Hyperlipidemia; Normal nuclear stress test; Pneumonia; Psychiatric problem; and PVD (peripheral vascular disease) ([facility] [address] Past Surgical History:  has a past surgical history that includes Hysterectomy; Cholecystectomy; Appendectomy;  section; Colonoscopy; Endoscopy, colon, diagnostic; doppler echocardiography (2008     EF 70%); ECHO Compl W Dop Color Flow (5/10/2012 EF 67%); vascular surgery (); and other surgical history (2011). Social History:  reports that she has been smoking Cigarettes. She has a 23.00 pack-year smoking history. She has never used smokeless tobacco. She reports that she does not drink alcohol or use drugs. Family History: family history includes Breast Cancer in her mother; Clotting Disorder in her father; Heart Attack in her sister;  Heart Disease in her sister; Ronal Frey in her brother. The patients home medications have been reviewed. Allergies: Patient has no known allergies.     -------------------------------------------------- RESULTS -------------------------------------------------    LABS:  Results for orders placed or performed during the hospital encounter of 09/08/18   CBC auto differential   Result Value Ref Range    WBC 20.9 (H) 4.5 - 11.5 E9/L    RBC 3.63 3.50 - 5.50 E12/L    Hemoglobin 9.3 (L) 11.5 - 15.5 g/dL    Hematocrit 30.5 (L) 34.0 - 48.0 %    MCV 84.0 80.0 - 99.9 fL    MCH 25.6 (L) 26.0 - 35.0 pg    MCHC 30.5 (L) 32.0 - 34.5 %    RDW 15.1 (H) 11.5 - 15.0 fL    Platelets 322 857 - 596 E9/L    MPV 9.9 7.0 - 12.0 fL    Neutrophils % 90.8 (H) 43.0 - 80.0 %    Immature Granulocytes % 1.0 0.0 - 5.0 %    Lymphocytes % 3.3 (L) 20.0 - 42.0 %    Monocytes % 4.8 2.0 - 12.0 %    Eosinophils % 0.0 0.0 - 6.0 %    Basophils % 0.1 0.0 - 2.0 %    Neutrophils # 18.98 (H) 1.80 - 7.30 E9/L    Immature Granulocytes # 0.21 E9/L    Lymphocytes # 0.70 (L) 1.50 - 4.00 E9/L    Monocytes # 1.00 (H) 0.10 - 0.95 E9/L    Eosinophils # 0.00 (L) 0.05 - 0.50 E9/L    Basophils # 0.03 0.00 - 0.20 E9/L   Comprehensive Metabolic Panel   Result Value Ref Range    Sodium 138 132 - 146 mmol/L    Potassium 4.2 3.5 - 5.0 mmol/L    Chloride 96 (L) 98 - 107 mmol/L    CO2 30 (H) 22 - 29 mmol/L    Anion Gap 12 7 - 16 mmol/L    Glucose 244 (H) 74 - 109 mg/dL    BUN 14 8 - 23 mg/dL    CREATININE 0.7 0.5 - 1.0 mg/dL    GFR Non-African American >60 >=60 mL/min/1.73    GFR African American >60     Calcium 8.4 (L) 8.6 - 10.2 mg/dL    Total Protein 6.9 6.4 - 8.3 g/dL    Alb 3.4 (L) 3.5 - 5.2 g/dL    Total Bilirubin 0.3 0.0 - 1.2 mg/dL    Alkaline Phosphatase 111 (H) 35 - 104 U/L    ALT 14 0 - 32 U/L    AST 15 0 - 31 U/L   Lipase   Result Value Ref Range    Lipase 19 13 - 60 U/L   Lactic Acid, Plasma   Result Value Ref Range    Lactic Acid 3.6 (H) 0.5 - 2.2 mmol/L   EKG 12

## 2018-09-08 NOTE — H&P
[facility]  Family Medicine  -Resident History & Physical Note-     Frandy Johns /  1950 / MRN [id number] / Janice Salazar / DOS 2018    PCP:  Madeline Mhuammad DO  Admitting Physician:  Garcia Nichole DO  Consultants:  Dr. FREEMAN with General Surgery, Dr. Meme Walters with IM  Chief Complaint   Patient presents with    Abdominal Pain    Emesis     since yesterday, unable to hold any liquids down      History of Present Illness  Frandy Johns is a 77 y.o. female with a past medical history pertinent for DMII, COPD with chronic respiratory failure who presents to [facility] ER complaining of severe abdominal pain that began last night. Had a bout of emesis in the mean time. States that she has not taken her medications today. Overall feeling poorly. Pt was actively vomiting during H&P. ED Course: Labs taken included CBC, CMP, lactic acid, finding leukocytosis and lactic acidosis. Imaging included a CT of the abdomen with contrast with findings that did not read ischemic colitis on the read, but after conversation with the radiologist it was felt that she has ischemic colitis. Dr. FREEMAN with General Surgery was consulted. Pt was given a bolus of normal saline, zofran, and IV zosyn.      Histories / Home Medications / Allergies  Past Medical History:   Diagnosis Date    Arthritis     legs, hands,neck    Asthma     Blood transfusion     [facility]    CHF [facility]congestive heart failure) ([facility]     Echo 3/19/15 EF 29%, stage I diastolic    COPD (chronic obstructive pulmonary disease) (HCC)     Diabetes mellitus (HCC)     GERD (gastroesophageal reflux disease)     History of echocardiogram 2017    EF 65%    Hyperlipidemia     Normal nuclear stress test 12    Dr Meghan Omalley Pneumonia     Psychiatric problem     PVD (peripheral vascular disease) ([facility] [address]      Past Surgical History:   Procedure Laterality Date    APPENDECTOMY       SECTION      x3    CHOLECYSTECTOMY      times daily for 90 days. . 8/15/18 11/13/18  Julio César Nares, DO   ferrous sulfate 325 (65 Fe) MG tablet Take 1 tablet by mouth daily (with breakfast) 8/15/18   Julio César Nares, DO   omeprazole (PRILOSEC) 20 MG delayed release capsule Take 1 capsule by mouth daily 8/7/18   Luz Elena Wright, DO   diltiazem (CARDIZEM CD) 120 MG extended release capsule Take 1 capsule by mouth every morning (before breakfast) 7/18/18   Julio César Nares, DO   folic acid (FOLVITE) 1 MG tablet Take 1 tablet by mouth daily 7/18/18   Julio César Nares, DO   pravastatin (PRAVACHOL) 20 MG tablet Take 2 tablets by mouth daily 7/18/18   Julio César Nares, DO   potassium chloride (KLOR-CON M) 20 MEQ extended release tablet Take 1 tablet by mouth daily 7/18/18   Julio César Nares, DO   denosumab (PROLIA) 60 MG/ML SOLN SC injection Inject 1 mL into the skin once for 1 dose 6/27/18 6/27/18  Julio César Everettes, DO   Calcium Citrate 1040 MG TABS Take 1 tablet by mouth daily 6/13/18   Julio César Everettes, DO   denosumab (PROLIA) 60 MG/ML SOLN SC injection Inject 1 mL into the skin once for 1 dose 6/13/18 8/15/18  Belinda Chamberlain, DO   melatonin 5 MG TABS tablet Take 5 mg by mouth nightly    Historical Provider, MD   sertraline (ZOLOFT) 100 MG tablet Take 100 mg by mouth nightly    Historical Provider, MD   omega-3 acid ethyl esters (LOVAZA) 1 g capsule Take 2 capsules by mouth 2 times daily 4/26/18   Javi Wright, DO   INCRUSE ELLIPTA 62.5 MCG/INH AEPB USE 1 INHALATION ONCE DAILY 4/6/18   Historical Provider, MD   VENTOLIN  (90 Base) MCG/ACT inhaler INHALE TWO PUFF BY MOUTH EVERY SIX HOURS AS NEEDED FOR WHEEZING 3/26/18   Julio César Floydes, DO   ondansetron (ZOFRAN-ODT) 4 MG disintegrating tablet Take 1 tablet by mouth every 8 hours as needed for Nausea or Vomiting 3/21/18   Julio César Kevin,    Lactobacillus (ACIDOPHILUS) CAPS capsule Take 1 capsule by mouth daily 2/12/18   Julio César Kevin, DO

## 2018-09-08 NOTE — H&P
negative for - constipation, diarrhea, gas/bloating, heartburn or hematemesis  Genito-Urinary ROS: negative for -  genital discharge, genital ulcers or hematuria  Musculoskeletal ROS: negative for - gait disturbance, muscle pain or muscular weakness    Physical exam:   /66   Pulse 116   Temp 98.6 °F (37 °C) (Oral)   Resp 28   Ht 5' 5\" (1.651 m)   Wt 124 lb 14.4 oz (56.7 kg)   SpO2 94%   BMI 20.78 kg/m²   General appearance:  NAD  Head: NCAT, PERRLA, EOMI, red conjunctiva  Neck: supple, no masses  Lungs: CTAB, equal chest rise bilateral  Heart: Reg rate  Abdomen: soft, moderately tender, no peritionit, moderately distended  Skin; no lesions  Gu: no cva tenderness  Extremities: extremities normal, atraumatic, no cyanosis or edema      Radiology:  CT abdomen/pelvis:   No acute intra-abdominal or pelvic pathology.     Assessment:  77 y.o. female with diffuse abdominal pain, lactic acidosis    Plan:  Npo, iv abx, monitor exam  Shira Fox MD  11:21 AM  9/8/2018

## 2018-09-08 NOTE — CONSULTS
112/54 91% on room air. The patient received 1L ns, duoneb, morphine, zosyn  in the emergency room and was admitted to ICU under the care of Dr. Marylene Coulter with consult to Dr. Anel Brandon and Mariann Sánchez for medical management. VITALS  BP: 130/66, Temp: 98.6 °F (37 °C), Pulse: 116, Resp: 28, SpO2: 94 %    Vitals:    18 0732 18 0855 18 1008 18 1111   BP: (!) 112/54 128/82 (!) 156/71 130/66   Pulse: 96 101  116   Resp: 16 24 29 28   Temp: 98.6 °F (37 °C)      TempSrc: Oral      SpO2: 91% 91% 90% 94%   Weight: 124 lb 14.4 oz (56.7 kg)      Height: 5' 5\" (1.651 m)            Histories  Past Medical History:   Diagnosis Date    Arthritis     legs, hands,neck    Asthma     Blood transfusion     [facility]    CHF (congestive heart failure) ([facility])     Echo 3/19/15 EF 84%, stage I diastolic    COPD (chronic obstructive pulmonary disease) ([facility])     Diabetes mellitus ([facility])     GERD (gastroesophageal reflux disease)     History of echocardiogram 2017    EF 65%    Hyperlipidemia     Normal nuclear stress test 12    Dr Lawson Delay      Pneumonia     Psychiatric problem     PVD (peripheral vascular disease) ([facility] [address]      Past Surgical History:   Procedure Laterality Date    APPENDECTOMY       SECTION      x3    CHOLECYSTECTOMY      COLONOSCOPY      DOPPLER ECHOCARDIOGRAPHY  2008     EF 70%    EF 70% , MODERATE CONCENTRIC LVH    ECHO COMPL W DOP COLOR FLOW  5/10/2012 EF 67%         ENDOSCOPY, COLON, DIAGNOSTIC      HYSTERECTOMY      OTHER SURGICAL HISTORY  2011    cyst on left neck removed    VASCULAR SURGERY      stents right leg     Family History   Problem Relation Age of Onset    Breast Cancer Mother            [facility] Clotting Disorder Father          PE    Heart Attack Sister     Heart Disease Sister     Lung Cancer Brother                Home Medications  Prior to Admission medications    Medication Sig Start Date End Date Taking?  Authorizing Provider vitamin D (ERGOCALCIFEROL) 69110 units CAPS capsule TAKE 1 CAPSULE BY MOUTH ONCE A WEEK 8/31/18   [facility] Mood, DO   Misc. Devices (RAISED TOILET SEAT) MISC Use as needed for ease of transfer 8/29/18   [facility] Mood, DO   doxepin (ZONALON) 5 % cream  5/29/18   Historical Provider, MD   metFORMIN (GLUCOPHAGE) 1000 MG tablet Take 1 tablet by mouth 2 times daily (with meals) 8/15/18   [facility] Mood, DO   bumetanide (BUMEX) 2 MG tablet Take 1 tablet by mouth 2 times daily 8/15/18   [facility] Mood, DO   rOPINIRole (REQUIP) 1 MG tablet Take 1 tablet by mouth nightly 8/15/18   [facility] Mood, DO   gabapentin (NEURONTIN) 300 MG capsule Take 2 capsules by mouth 2 times daily for 90 days. . 8/15/18 11/13/18  [facility] Mood, DO   ferrous sulfate 325 (65 Fe) MG tablet Take 1 tablet by mouth daily (with breakfast) 8/15/18   [facility] Mood, DO   omeprazole (PRILOSEC) 20 MG delayed release capsule Take 1 capsule by mouth daily 8/7/18   Walter Wright, DO   diltiazem (CARDIZEM CD) 120 MG extended release capsule Take 1 capsule by mouth every morning (before breakfast) 7/18/18   [facility] Mood, DO   folic acid (FOLVITE) 1 MG tablet Take 1 tablet by mouth daily 7/18/18   [facility] Mood, DO   pravastatin (PRAVACHOL) 20 MG tablet Take 2 tablets by mouth daily 7/18/18   [facility] Mood, DO   potassium chloride (KLOR-CON M) 20 MEQ extended release tablet Take 1 tablet by mouth daily 7/18/18   [facility] Mood, DO   denosumab (PROLIA) 60 MG/ML SOLN SC injection Inject 1 mL into the skin once for 1 dose 6/27/18 6/27/18  [facility] Mood, DO   Calcium Citrate 1040 MG TABS Take 1 tablet by mouth daily 6/13/18   [facility] Mood, DO   denosumab (PROLIA) 60 MG/ML SOLN SC injection Inject 1 mL into the skin once for 1 dose 6/13/18 8/15/18  Belinda Bruce, DO   melatonin 5 MG TABS tablet Take 5 mg by mouth nightly    Historical Provider, MD   sertraline (ZOLOFT) 100 MG tablet Take 100 mg by mouth nightly    Historical Provider, MD   omega-3 acid ethyl esters (LOVAZA) 1 g capsule Take 2 capsules by mouth 2 times daily 4/26/18   Dhaval Wright,    INCRUSE ELLIPTA 62.5 MCG/INH AEPB USE 1 INHALATION ONCE DAILY 4/6/18   Historical Provider, MD   VENTOLIN  (90 Base) MCG/ACT inhaler INHALE TWO PUFF BY MOUTH EVERY SIX HOURS AS NEEDED FOR WHEEZING 3/26/18   Sobia Arana,    ondansetron (ZOFRAN-ODT) 4 MG disintegrating tablet Take 1 tablet by mouth every 8 hours as needed for Nausea or Vomiting 3/21/18   Sobia Arana, DO   Lactobacillus (ACIDOPHILUS) CAPS capsule Take 1 capsule by mouth daily 2/12/18   Sobia Arana, DO   indomethacin (INDOCIN) 50 MG capsule Take 1 capsule by mouth 3 times daily (with meals) 12/12/17   Philip Duncan MD   budesonide-formoterol (SYMBICORT) 160-4.5 MCG/ACT AERO Inhale 2 puffs into the lungs 2 times daily 10/4/17   Sobia Arana DO   vitamin E 1000 UNITS capsule Take 1,000 Units by mouth daily    Historical Provider, MD   QUEtiapine (SEROQUEL) 200 MG tablet Take 200 mg by mouth nightly 12/5/16   Historical Provider, MD   OXYGEN 4 L/min by Nasal route nightly And PRN during the day     Historical Provider, MD       Allergies  Patient has no known allergies.     Social Hx  Social History     Social History    Marital status:      Spouse name: N/A    Number of children: N/A    Years of education: N/A     Occupational History    retired      Social History Main Topics    Smoking status: Current Every Day Smoker     Packs/day: 0.50     Years: 46.00     Types: Cigarettes     Last attempt to quit: 5/7/2012    Smokeless tobacco: Never Used    Alcohol use No      Comment: 1 cup half and half coffee per day    Drug use: No    Sexual activity: Yes     Partners: Male     Other Topics Concern    Not on file     Social History Narrative    No narrative on file       Review of Systems  All bolded are strength 5/5 globally; no slurred speech    Laboratory Data  Recent Results (from the past 24 hour(s))   EKG 12 Lead    Collection Time: 09/08/18  7:39 AM   Result Value Ref Range    Ventricular Rate 95 BPM    Atrial Rate 95 BPM    P-R Interval 154 ms    QRS Duration 132 ms    Q-T Interval 406 ms    QTc Calculation (Bazett) 510 ms    P Axis 51 degrees    R Axis 25 degrees    T Axis 32 degrees   CBC auto differential    Collection Time: 09/08/18  7:55 AM   Result Value Ref Range    WBC 20.9 (H) 4.5 - 11.5 E9/L    RBC 3.63 3.50 - 5.50 E12/L    Hemoglobin 9.3 (L) 11.5 - 15.5 g/dL    Hematocrit 30.5 (L) 34.0 - 48.0 %    MCV 84.0 80.0 - 99.9 fL    MCH 25.6 (L) 26.0 - 35.0 pg    MCHC 30.5 (L) 32.0 - 34.5 %    RDW 15.1 (H) 11.5 - 15.0 fL    Platelets 468 459 - 981 E9/L    MPV 9.9 7.0 - 12.0 fL    Neutrophils % 90.8 (H) 43.0 - 80.0 %    Immature Granulocytes % 1.0 0.0 - 5.0 %    Lymphocytes % 3.3 (L) 20.0 - 42.0 %    Monocytes % 4.8 2.0 - 12.0 %    Eosinophils % 0.0 0.0 - 6.0 %    Basophils % 0.1 0.0 - 2.0 %    Neutrophils # 18.98 (H) 1.80 - 7.30 E9/L    Immature Granulocytes # 0.21 E9/L    Lymphocytes # 0.70 (L) 1.50 - 4.00 E9/L    Monocytes # 1.00 (H) 0.10 - 0.95 E9/L    Eosinophils # 0.00 (L) 0.05 - 0.50 E9/L    Basophils # 0.03 0.00 - 0.20 E9/L   Comprehensive Metabolic Panel    Collection Time: 09/08/18  7:55 AM   Result Value Ref Range    Sodium 138 132 - 146 mmol/L    Potassium 4.2 3.5 - 5.0 mmol/L    Chloride 96 (L) 98 - 107 mmol/L    CO2 30 (H) 22 - 29 mmol/L    Anion Gap 12 7 - 16 mmol/L    Glucose 244 (H) 74 - 109 mg/dL    BUN 14 8 - 23 mg/dL    CREATININE 0.7 0.5 - 1.0 mg/dL    GFR Non-African American >60 >=60 mL/min/1.73    GFR African American >60     Calcium 8.4 (L) 8.6 - 10.2 mg/dL    Total Protein 6.9 6.4 - 8.3 g/dL    Alb 3.4 (L) 3.5 - 5.2 g/dL    Total Bilirubin 0.3 0.0 - 1.2 mg/dL    Alkaline Phosphatase 111 (H) 35 - 104 U/L    ALT 14 0 - 32 U/L    AST 15 0 - 31 U/L   Lipase    Collection Time: 09/08/18 7:55 AM   Result Value Ref Range    Lipase 19 13 - 60 U/L   Lactic Acid, Plasma    Collection Time: 09/08/18  7:55 AM   Result Value Ref Range    Lactic Acid 3.6 (H) 0.5 - 2.2 mmol/L   POCT Glucose    Collection Time: 09/08/18 12:09 PM   Result Value Ref Range    Meter Glucose 224 (H) 70 - 110 mg/dL       Imaging  Ct Abdomen Pelvis W Iv Contrast Additional Contrast? Oral    Addendum Date: 9/8/2018    Upon further review, there is subtle pneumatosis of the transverse colon with linear areas of air within the mesentery, likely related to portal venous gas. Findings are suspicious for ischemic colitis of the transverse colon. Findings were reviewed with Dr. Kortney Guzman of the Emergency Department on 9/8/2018 at 0956 hours. Result Date: 9/8/2018  Location: 200 Indication: Abdominal pain. Comparison: CT abdomen/pelvis from 5/18/2018. Technique: Multidetector CT imaging of the abdomen and pelvis was performed after the administration of intravenous contrast (80 cc of Isovue-370). Oral contrast was administered. Coronal and sagittal reformatted images were obtained. Automated dose exposure control was used for this exam. FINDINGS: Lower thorax:  Limited evaluation of the lower chest demonstrates mild dependent changes within bilateral lower lobes. There is no pleural effusion. Peritoneum: There is no free fluid within the abdomen or pelvis. There is no pneumoperitoneum. Hepatobiliary: The liver is normal in morphology and attenuation. The gallbladder is surgically absent. The common bile duct is normal in caliber. Pancreas: Unremarkable. Spleen: Unremarkable. Gastrointestinal: The distal esophagus, stomach, duodenum are normal in appearance. The small bowel loops are normal in caliber. The appendix is identified and is unremarkable. The colon is normal in caliber and grossly unremarkable. Adrenal glands: Unremarkable. Genitourinary: Bilateral kidneys are normal in morphology and demonstrate symmetric enhancement.

## 2018-09-09 NOTE — PROGRESS NOTES
Surgery Progress Note            Chief complaint:   Patient Active Problem List   Diagnosis    Chronic diastolic heart failure    Hyperlipidemia    GERD (gastroesophageal reflux disease)    Depression    [facility]    Diabetes mellitus type 2, noninsulin dependent ([facility] [address]    Asthma    COPD [facility]ronic obstructive [facility] disease) ([facility]    PVD (peripheral vascular disease) ([facility]    Elevated lactic acid level    Microcytic anemia    Tobacco abuse    Orthostatic dizziness    Osteopenia of hip    Other specified disorders of bone density and structure, unspecified thigh    Age-related osteoporosis without current pathological fracture    Ischemic colitis ([facility]       S: doing better today, still with pain but improved    O:   Vitals:    09/09/18 0800   BP: 121/74   Pulse:    Resp:    Temp: 98.1 °F (36.7 °C)   SpO2:        Intake/Output Summary (Last 24 hours) at 09/09/18 0827  Last data filed at 09/09/18 0327   Gross per 24 hour   Intake              450 ml   Output             1100 ml   Net             -650 ml           Labs:  Recent Labs      09/08/18   0755  09/08/18   1800  09/09/18   0645   WBC  20.9*  29.4*  24.0*   HGB  9.3*  8.4*  8.1*   HCT  30.5*  26.5*  26.5*  25.4*     Lab Results   Component Value Date    CREATININE 0.5 09/09/2018    BUN 12 09/09/2018     09/09/2018    K 3.5 09/09/2018     09/09/2018    CO2 27 09/09/2018     Recent Labs      09/08/18   0755   LIPASE  19       Physical exam:   /74   Pulse 99   Temp 98.1 °F (36.7 °C) (Oral)   Resp 16   Ht 5' 5\" (1.651 m)   Wt 144 lb (65.3 kg)   SpO2 91%   BMI 23.96 kg/m²   General appearance: NAD  Head: NCAT  Neck: supple, no masses  Lungs: equal chest rise bilateral  Heart: S1S2 present  Abdomen: soft, moderately tender, nondistended  Skin; no lesions  Gu: no cva tenderness  Extremities: extremities normal, atraumatic, no cyanosis or edema    A:  Transverse colon ischemic colitis     P: monitor exam, fluids, npo, iv abx watch for peritoneal signs or increasing lactic acidosis.     Vlad Arevalo MD  9/9/2018

## 2018-09-09 NOTE — PROGRESS NOTES
HPI:  Yanet Padilla seems to be improving on a daily basis. Her abdominal pain is less prominent today. No family members were present during my examination. Multiple subspecialists continue to provide consultation. She is tolerating IV fluid resuscitation. Patient voiced no new complaints since hospital admission. Questions, answers, and tests reviewed. ROS:  Cardiovascular:   Denies any chest pain, irregular heartbeats, or palpitations. Respiratory:   Denies shortness of breath, coughing, sputum production, hemoptysis, or wheezing. Gastrointestinal:   Admits to improving abdominal discomfort. Less nausea. No current vomiting. Extremities:   Denies any lower extremity swelling or edema. Neurology:    Denies any headache or focal neurological deficits. No weakness or paresthesia. Derm:    Denies any rashes, ulcers, or excoriations. Denies bruising. Genitourinary:    Denies any urgency, frequency, hematuria. Voiding without difficulty. Physical Exam:    Vitals:    09/09/18 0600   BP: (!) 107/54   Pulse: 99   Resp: 21   Temp: 98.8 °F (37.1 °C)   SpO2: 92%       HEENT:  PERRLA. EOMI. Sclera clear. Buccal mucosa moist. Nasal cannula oxygen is in place. Neck:  Supple. Trachea midline. No thyromegaly. No JVD. No bruits. Heart:  Rhythm regular, rate controlled. No murmurs. Lungs:  Symmetrical. Clear to auscultation bilaterally. No wheezes. No rhonchi. No rales. Abdomen: Soft. Mildly tender to palpation diffusely. Bowel sounds are active. Extremities:  Peripheral pulses present. No peripheral edema. No ulcers. Neurologic:  Alert x 3. No focal deficit. Cranial nerves grossly intact. Skin:  No petechia. No hemorrhage. No wounds.     CBC with Differential:    Lab Results   Component Value Date    WBC 29.4 09/08/2018    RBC 3.24 09/08/2018    HGB 8.4 09/08/2018    HCT 26.5 09/08/2018    HCT 26.5 09/08/2018     09/08/2018    MCV 81.8 09/08/2018    MCH 25.9 09/08/2018    MCHC 31.7

## 2018-09-09 NOTE — PROGRESS NOTES
Name: Kam Bae  : 1950  MRN: [id number]  Room: [place] [address]  DOS: 2018    Family Medicine Resident Progress Note    PCP: Jeniffer Rae DO  Admitting Physician: Dolores Marrero DO    Chief Complaint:    Chief Complaint   Patient presents with    Abdominal Pain    Emesis     since yesterday, unable to hold any liquids down       Subjective     Arleen Patel was seen and examined at bedside today. All patient questions were answered and all tests were reviewed. No family present during my examination. The patient states that she feels a bit better today. Abdominal pain is 7/10. Denies vomiting since yesterday afternoon. Has not passed gas or had a bowel movement. Denies hunger or thirst. No further complaints. States that her last BM was Friday.      Hospital Medications  Current Facility-Administered Medications   Medication Dose Route Frequency Provider Last Rate Last Dose    albuterol sulfate  (90 Base) MCG/ACT inhaler 1 puff  1 puff Inhalation Q6H PRN Mervin Wright DO        sodium chloride flush 0.9 % injection 10 mL  10 mL Intravenous 2 times per day Walter Wright, DO   10 mL at 18 2030    sodium chloride flush 0.9 % injection 10 mL  10 mL Intravenous PRN Mervin Wright DO        magnesium hydroxide (MILK OF MAGNESIA) 400 MG/5ML suspension 30 mL  30 mL Oral Daily PRN Mervin Wright DO        enoxaparin (LOVENOX) injection 40 mg  40 mg Subcutaneous Daily Mervin Wright DO   40 mg at 18 1222    acetaminophen (TYLENOL) tablet 650 mg  650 mg Oral Q4H PRN Mervin Wright DO        glucose (GLUTOSE) 40 % oral gel 15 g  15 g Oral PRN Mervin Wright, DO        dextrose 50 % solution 12.5 g  12.5 g Intravenous PRN Mervin Wright DO        glucagon (rDNA) injection 1 mg  1 mg Intramuscular PRN Mervin Wright, DO        dextrose 5 % solution  100 mL/hr Intravenous PRN Mervin Wright, DO        insulin lispro hallucinations. ENT:  Epistaxis, headaches, vertigo, visual changes. Cardiovascular:  Chest pain, irregular heartbeats, palpitations, paroxysmal nocturnal dyspnea. Respiratory:  Shortness of breath, coughing, sputum production, hemoptysis, wheezing, orthopnea. Gastrointestinal:  Nausea, vomiting, diarrhea, heartburn, constipation, abdominal pain, hematemesis, hematochezia, melena, acholic stools  Genito-Urinary:  Dysuria, urgency, frequency, hematuria  Musculoskeletal:  Joint pain, joint stiffness, joint swelling, muscle pain  Neurology:  Headache, focal neurological deficits, weakness, numbness, paresthesia  Derm:  Rashes, ulcers, excoriations, bruising  Extremities:  Decreased ROM, peripheral edema, mottling    Physical Exam     Most Recent Recorded Vitals:  BP (!) 107/54   Pulse 99   Temp 98.8 °F (37.1 °C) (Axillary)   Resp 21   Ht 5' 5\" (1.651 m)   Wt 144 lb (65.3 kg)   SpO2 92%   BMI 23.96 kg/m²   I/O last 3 completed shifts: In: 450 [I.V.:150; IV Piggyback:300]  Out: 800 [Urine:800]  I/O this shift:  In: -   Out: 300 [Urine:300]    General: Awake, alert and Oriented x 3, NAD  Head: Normocephalic, atraumatic. Oral mucosae moist.  Eyes: Conjunctivae/corneas clear, Sclera non icteric  Lungs: No increased work of breathing. Good air flow. Coarse rhonchi throughout all lung fields. Heart: RRR, - MGR, +S1, S2  Abdomen: Soft, globally tender; bowel sounds sluggish; guarding without rigidity.   Extremities:No lower extremity edema, extremities atraumatic, no cyanosis, no clubbing, peripheral pulses equal  Neurologic: Moves all 4 extremities spontaneously, cranial nerves II through XII grossly intact, speech appropriate without slurring, normal muscle tone    Pertinent/New Labs and Imaging Studies     Recent Results (from the past 24 hour(s))   EKG 12 Lead    Collection Time: 09/08/18  7:39 AM   Result Value Ref Range    Ventricular Rate 95 BPM    Atrial Rate 95 BPM    P-R Interval 154 ms    QRS Duration Meter Glucose 224 (H) 70 - 110 mg/dL   Lactic acid, plasma    Collection Time: 09/08/18  3:10 PM   Result Value Ref Range    Lactic Acid 2.1 0.5 - 2.2 mmol/L   Hemoglobin A1c    Collection Time: 09/08/18  3:10 PM   Result Value Ref Range    Hemoglobin A1C 6.2 (H) 4.0 - 5.6 %   POCT Glucose    Collection Time: 09/08/18  5:30 PM   Result Value Ref Range    Meter Glucose 185 (H) 70 - 110 mg/dL   Basic Metabolic Panel    Collection Time: 09/08/18  6:00 PM   Result Value Ref Range    Sodium 132 132 - 146 mmol/L    Potassium 3.7 3.5 - 5.0 mmol/L    Chloride 97 (L) 98 - 107 mmol/L    CO2 25 22 - 29 mmol/L    Anion Gap 10 7 - 16 mmol/L    Glucose 185 (H) 74 - 109 mg/dL    BUN 14 8 - 23 mg/dL    CREATININE 0.6 0.5 - 1.0 mg/dL    GFR Non-African American >60 >=60 mL/min/1.73    GFR African American >60     Calcium 7.4 (L) 8.6 - 10.2 mg/dL   CBC Auto Differential    Collection Time: 09/08/18  6:00 PM   Result Value Ref Range    WBC 29.4 (H) 4.5 - 11.5 E9/L    RBC 3.24 (L) 3.50 - 5.50 E12/L    Hemoglobin 8.4 (L) 11.5 - 15.5 g/dL    Hematocrit 26.5 (L) 34.0 - 48.0 %    MCV 81.8 80.0 - 99.9 fL    MCH 25.9 (L) 26.0 - 35.0 pg    MCHC 31.7 (L) 32.0 - 34.5 %    RDW 15.1 (H) 11.5 - 15.0 fL    Platelets 785 713 - 843 E9/L    MPV 10.0 7.0 - 12.0 fL    Neutrophils % 96.5 (H) 43.0 - 80.0 %    Lymphocytes % 0.9 (L) 20.0 - 42.0 %    Monocytes % 2.6 2.0 - 12.0 %    Eosinophils % 0.0 0.0 - 6.0 %    Basophils % 0.1 0.0 - 2.0 %    Neutrophils # 28.52 (H) 1.80 - 7.30 E9/L    Lymphocytes # 0.29 (L) 1.50 - 4.00 E9/L    Monocytes # 0.88 0.10 - 0.95 E9/L    Eosinophils # 0.00 (L) 0.05 - 0.50 E9/L    Basophils # 0.00 0.00 - 0.20 E9/L    Polychromasia 1+     Hypochromia 1+     Poikilocytes 1+     Target Cells 1+    Ferritin    Collection Time: 09/08/18  6:00 PM   Result Value Ref Range    Ferritin 1 ng/mL   Iron and TIBC    Collection Time: 09/08/18  6:00 PM   Result Value Ref Range    Iron 12 (L) 37 - 145 mcg/dL    TIBC 296 250 - 450 mcg/dL    Iron Saturation 4 (L) 15 - 50 %   Reticulocytes    Collection Time: 09/08/18  6:00 PM   Result Value Ref Range    Retic Ct Pct 2.1 (H) 0.4 - 1.9 %    Retic Ct Abs 0.068 E12/L    Immature Retic Fract 32.0 (H) 3.0 - 15.9 %    Hematocrit 26.5 (L) 34.0 - 48.0 %    Retic HGB Equivalent 26.4 (L) 28.2 - 36.6 pg   Transferrin    Collection Time: 09/08/18  6:00 PM   Result Value Ref Range    Transferrin 252 200 - 360 mg/dL   Vitamin B12 & Folate    Collection Time: 09/08/18  6:00 PM   Result Value Ref Range    Vitamin B-12 188 (L) 211 - 946 pg/mL    Folate >20.0 4.8 - 24.2 ng/mL   Lactate dehydrogenase    Collection Time: 09/08/18  6:00 PM   Result Value Ref Range     135 - 214 U/L   POCT Glucose    Collection Time: 09/08/18  8:27 PM   Result Value Ref Range    Meter Glucose 157 (H) 70 - 110 mg/dL       Radiology  Ct Abdomen Pelvis W Iv Contrast Additional Contrast? Oral    Addendum Date: 9/8/2018    Upon further review, there is subtle pneumatosis of the transverse colon with linear areas of air within the mesentery, likely related to portal venous gas. Findings are suspicious for ischemic colitis of the transverse colon. Findings were reviewed with Dr. Triston Gore of the Emergency Department on 9/8/2018 at 0956 hours. Result Date: 9/8/2018  Location: 200 Indication: Abdominal pain. Comparison: CT abdomen/pelvis from 5/18/2018. Technique: Multidetector CT imaging of the abdomen and pelvis was performed after the administration of intravenous contrast (80 cc of Isovue-370). Oral contrast was administered. Coronal and sagittal reformatted images were obtained. Automated dose exposure control was used for this exam. FINDINGS: Lower thorax:  Limited evaluation of the lower chest demonstrates mild dependent changes within bilateral lower lobes. There is no pleural effusion. Peritoneum: There is no free fluid within the abdomen or pelvis. There is no pneumoperitoneum.  Hepatobiliary: The liver is normal in morphology and attenuation. The gallbladder is surgically absent. The common bile duct is normal in caliber. Pancreas: Unremarkable. Spleen: Unremarkable. Gastrointestinal: The distal esophagus, stomach, duodenum are normal in appearance. The small bowel loops are normal in caliber. The appendix is identified and is unremarkable. The colon is normal in caliber and grossly unremarkable. Adrenal glands: Unremarkable. Genitourinary: Bilateral kidneys are normal in morphology and demonstrate symmetric enhancement. There is no renal calculus, hydronephrosis, or hydroureter bilaterally. The urinary bladder is unremarkable. The uterus is surgically absent  Vascular: There is scattered atherosclerotic disease. The abdominal aorta is normal in caliber. Lymph nodes: No abdominal, retroperitoneal, or pelvic lymphadenopathy. Bones: No suspicious lytic or blastic osseous lesion. Soft tissues: Unremarkable. No acute intra-abdominal or pelvic pathology. Xr Chest Portable    Result Date: 9/8/2018  Reading location: 200 Indication: Epigastric pain. Comparison: Chest radiograph from 11/20/2017. Technique: Portable AP upright chest radiograph was obtained. Findings: The cardiomediastinal silhouette is stable in size and contours. There is patchy bibasilar airspace disease, likely atelectasis. Costophrenic angles are clear. There is no evidence of pneumothorax. Patchy bibasilar airspace disease, likely atelectasis. Assessment and Plan     Active Hospital Problems    Diagnosis Date Noted    Ischemic colitis ([facility] [address] [K55.9] 09/08/2018       Assessment and Plan  Pt with improved pain control today, tolerating NPO well. No flatus or BM. Less guarding with abdominal exam today. Will continue NPO and current pain regimen per Dr. Zoila Garner with General Surgery. Will collect a flat plate to ensure no obstructive pattern is seen. Continue care in the ICU. Input from consultants is greatly appreciated.     Patient has been seen, examined, and

## 2018-09-10 NOTE — PROGRESS NOTES
labored breathing  HEENT:  PERRL; EOMI; sclera clear; buccal mucosa moist  Neck:  supple; trachea midline; no thyromegaly; no JVD; no bruits  Heart:  rhythm regular; rate controlled; no murmurs  Lungs:  symmetrical; clear to auscultation bilaterally; no wheezes; no rhonchi; no rales  Abdomen:  soft, mildly tender, non-distended; bowel sounds positive; no organomegaly or masses  Extremities:  peripheral pulses present; no peripheral edema; no ulcers  Neurologic:  alert and oriented x 3; no focal deficit; cranial nerves grossly intact  Skin:  no petechia; no hemorrhage; no wounds    Medications  Scheduled Meds    sodium chloride flush  10 mL Intravenous 2 times per day    enoxaparin  40 mg Subcutaneous Daily    insulin lispro  0-12 Units Subcutaneous TID WC    insulin lispro  0-6 Units Subcutaneous Nightly    metroNIDAZOLE  500 mg Intravenous Q8H    ciprofloxacin  400 mg Intravenous Q12H    ipratropium-albuterol  1 ampule Inhalation Q4H While awake    budesonide  0.5 mg Nebulization BID    formoterol  20 mcg Nebulization BID    methylPREDNISolone  40 mg Intravenous Q12H    pantoprazole  40 mg Intravenous BID     Infusion Meds    dextrose      lactated ringers 75 mL/hr at 09/10/18 0340     PRN Meds albuterol sulfate HFA, sodium chloride flush, magnesium hydroxide, acetaminophen, glucose, dextrose, glucagon (rDNA), dextrose, morphine **OR** morphine, trimethobenzamide    · Recent labs, imaging, and micro results are available in the EMR and have been reviewed. Assessment and Plan  Patient is a 77 y.o. female who presented with abdominal pain. The active problem list is as follows:    Ischemic colitis of transverse colon   Lactic acidosis, resolved   Leukocytosis   DMII  COPD  HLD  PVD  Hx CHF    Patient is doing much better this morning with no further nausea or abdominal pain. Diet to be advanced, per surgery. Continue antibiotics.  She remains on 15L HFNC, will attempt to wean as tolerated, on 4L chronically. Home medications and med rec to be reviewed by admitting, if possible have family bring in what she is currently taking and restart necessary home medications. · Routine labs in AM.  · DVT prophylaxis. · Please see orders for further management and care. The plan was discussed with Dr. Richar Aragon.     Diana Weaver DO PGY2  9/10/2018  8:18 AM

## 2018-09-10 NOTE — PROGRESS NOTES
TID WC Mervin Wright, DO   2 Units at 09/09/18 1711    insulin lispro (HUMALOG) injection vial 0-6 Units  0-6 Units Subcutaneous Nightly Mervin Wright, DO   1 Units at 09/08/18 2032    lactated ringers infusion   Intravenous Continuous Chi Mcclelland MD 75 mL/hr at 09/10/18 0340      morphine injection 2 mg  2 mg Intravenous Q2H PRN Chi Mcclelland MD   2 mg at 09/10/18 0050    Or    morphine (PF) injection 4 mg  4 mg Intravenous Q2H PRN Chi Mcclelland MD   4 mg at 09/10/18 0447    metronidazole (FLAGYL) 500 mg in NaCl 100 mL IVPB premix  500 mg Intravenous Q8H Chi Mcclelland MD   Stopped at 09/10/18 0616    ciprofloxacin (CIPRO) IVPB 400 mg  400 mg Intravenous Q12H Chi Mcclelland MD   Stopped at 09/10/18 0224    trimethobenzamide (TIGAN) injection 200 mg  200 mg Intramuscular Q6H PRN Chi Mcclelland MD   200 mg at 09/10/18 0050    ipratropium-albuterol (DUONEB) nebulizer solution 1 ampule  1 ampule Inhalation Q4H While awake Gustavo Blanks, DO   1 ampule at 09/09/18 1340    budesonide (PULMICORT) nebulizer suspension 500 mcg  0.5 mg Nebulization BID Gustavo Blanks, DO   500 mcg at 09/10/18 0784    formoterol (PERFOROMIST) nebulizer solution 20 mcg  20 mcg Nebulization BID Gustavo Blanks, DO   20 mcg at 09/10/18 2051    methylPREDNISolone sodium (SOLU-MEDROL) injection 40 mg  40 mg Intravenous Q12H Elise Arana, DO   40 mg at 09/10/18 0057    pantoprazole (PROTONIX) injection 40 mg  40 mg Intravenous BID Devaughn Arana, DO   40 mg at 09/09/18 2104       PRN Medications  albuterol sulfate HFA, sodium chloride flush, magnesium hydroxide, acetaminophen, glucose, dextrose, glucagon (rDNA), dextrose, morphine **OR** morphine, trimethobenzamide    Review of Systems: All bolded are positive, all others are negative. General:  Fever, chills, diaphoresis, fatigue, malaise, night sweats, weight loss  Psychological:  Anxiety, disorientation, hallucinations.   ENT: Range    Meter Glucose 137 (H) 70 - 110 mg/dL   POCT Glucose    Collection Time: 09/09/18 12:45 PM   Result Value Ref Range    Meter Glucose 121 (H) 70 - 110 mg/dL   POCT Glucose    Collection Time: 09/09/18  5:04 PM   Result Value Ref Range    Meter Glucose 190 (H) 70 - 110 mg/dL   POCT Glucose    Collection Time: 09/09/18  8:55 PM   Result Value Ref Range    Meter Glucose 119 (H) 70 - 110 mg/dL         Radiology  Xr Abdomen (kub) (single Ap View)    Result Date: 9/9/2018  Location:100 Exam: XR ABDOMEN (KUB) (SINGLE AP VIEW) Indications: Abdominal pain and distention Findings: Portable AP supine views of the abdomen were obtained. Prior study dated 4/18/2011 was reviewed. A nonspecific nonobstructive bowel gas pattern is seen. There is contrast within the ascending colon and within a tubular structure over the right sacral body which is felt to represent the appendix. Contrast is present within the moderately distended urinary bladder. Vascular calcifications are identified. Degenerative changes of the lower thoracic spine, the lumbar spine, and both hips are present. 1. Nonspecific nonobstructive bowel gas pattern. 2. Contrast within bowel and urinary bladder are felt to be secondary to previous administration of oral and intravenous contrast exam for CT of the abdomen and pelvis study dated 9/8/2018. 3. Degenerative changes    Ct Abdomen Pelvis W Iv Contrast Additional Contrast? Oral    Addendum Date: 9/8/2018    Upon further review, there is subtle pneumatosis of the transverse colon with linear areas of air within the mesentery, likely related to portal venous gas. Findings are suspicious for ischemic colitis of the transverse colon. Findings were reviewed with Dr. Mayur Cedillo of the Emergency Department on 9/8/2018 at 0956 hours. Result Date: 9/8/2018  Location: 200 Indication: Abdominal pain. Comparison: CT abdomen/pelvis from 5/18/2018.  Technique: Multidetector CT imaging of the abdomen and pelvis atelectasis. Cta Chest W Contrast    Result Date: 9/9/2018  Reading Location: 200 Indication: Shortness of breath Comparison: CT abdomen from 9/8/2018; CT chest from 6/15/2018. Technique: Multidetector CT angiography of the chest was preformed after the administration of intravenous contrast (80 mL of Isovue-370). Coronal, sagittal, and MIP reformatted images were obtained. Automated dose exposure control was used for this exam. FINDINGS: There is adequate opacification of the pulmonary arteries to the subsegmental level. No pulmonary artery filling defect is identified. The main pulmonary artery is enlarged. The central airways are patent. There is no bronchiectasis. There are increased dependent consolidations with air bronchograms within bilateral lower lobes compared to the CT abdomen from 9/8/2018. Redemonstrated are patchy groundglass opacities throughout bilateral lungs, similar to the previous CT chest from 6/15/2018. There is no pneumothorax or pleural effusion. There is a stable 3 mm nodule within the right upper lobe (series 5, image 56). A previously noted subpleural nodule within the left lower lobe is obscured due to consolidation. The heart is stable in size. There is no large pericardial effusion. The thoracic aorta is normal in caliber and enhancement. There is mild scattered prostatic calcification. Redemonstrated are mildly prominent precarinal and subcarinal lymph nodes, slightly increased in size compared to the previous CT chest. There is no axillary lymphadenopathy. Visualized portion of the upper abdomen demonstrates postsurgical changes from prior cholecystectomy. There is a trace amount of perisplenic free fluid. 1. No evidence of pulmonary embolism to the subsegmental level. 2. Increased dependent consolidations within bilateral lower lobes, likely atelectasis versus pneumonia.  3. Redemonstration of patchy ground glass opacities throughout bilateral lungs, likely related to chronic

## 2018-09-11 NOTE — PROGRESS NOTES
Patient's HR increased to 129-132, /60, SpO2 88% on 14 L high flow nasal cannula. Patient's oxygen increased to 15 L high flow NC and maintaining 89-88%. Crackles heard in bilateral lower bases. Patient switched to non re-breather mask at 15 L. Patient O2 increased to 90-92%. Dr. Danisha Moser and Dr. Nicholos Babinski notified. Dr. Nicholos Babinski ordered an EKG, Chest x-ray, and 40 mg IV lasix. Dr. Danisha Moser notified of EKG result sinus tachycardia with right bundle branch block, chest x-ray showed thoracic aorta tortuous, mild cardiomegaly, and mild increase in pulmonary vascularity. Chest x-ray impression showed mild CHF and bibasilar dependent atelectasis/ infiltrates. Dr. Danisha Moser discussed to continue IVF and to notify him if patient's heart rate continues to stay 120's or higher. Dr. Danisha Moser said to discontinue the 0700 scheduled chest x-ray. Dr. Danisha Moser later notified that patient's HR maintaining 100-120, /55, SpO2 96% on 15 L non re-breather. No new orders. Continuing to monitor patient closely.

## 2018-09-11 NOTE — PROGRESS NOTES
Name: Frandy Johns  : 1950  MRN: [id number]  Room: [facility] [place]-  DOS: 2018    Family Medicine Resident Progress Note    PCP: Madeline Muhammad DO  Admitting Physician: Garcia Nichole DO    Chief Complaint:    Chief Complaint   Patient presents with    Abdominal Pain    Emesis     since yesterday, unable to hold any liquids down       Subjective     [facility] was seen and examined at bedside today. All patient questions were answered and all tests were reviewed. No family present during my examination. The patient states that she had a bowel movement yesterday and that her abdominal pain/discomfort is minimal, improved from yesterday (9/10/18). Pt complained of a single bout of abdominal pain that resolved without pain medication. She noticed no new changes in her respiratory effort, despite her low oxygen level variations. She states that she follows Dr. Sulma Romero with pulmonology; last visit several months ago. Discussion held about discharge to SNF; she is amenable to this.     Hospital Medications  Current Facility-Administered Medications   Medication Dose Route Frequency Provider Last Rate Last Dose    ciprofloxacin (CIPRO) tablet 500 mg  500 mg Oral 2 times per day Izola Outhouse, DO   500 mg at 09/10/18 2101    metroNIDAZOLE (FLAGYL) tablet 500 mg  500 mg Oral 3 times per day [facility] Outhouse, DO   500 mg at 18 3637    pantoprazole (PROTONIX) tablet 40 mg  40 mg Oral QAM  Harrison Driscoll, DO   40 mg at 18 2915    pravastatin (PRAVACHOL) tablet 40 mg  40 mg Oral Daily [place] Outhouse, DO   40 mg at 09/10/18 210    QUEtiapine (SEROQUEL) tablet 200 mg  200 mg Oral Nightly Izola Outhouse, DO   200 mg at 18 0013    rOPINIRole (REQUIP) tablet 1 mg  1 mg Oral Nightly Izola Outhouse, DO   1 mg at 18 0013    sertraline (ZOLOFT) tablet 100 mg  100 mg Oral Nightly Izola Outhouse, DO   100 mg at 18 0012    ferrous sulfate tablet 325 mg  325 mg Oral Daily with breakfast Omayra Outhouse, DO        folic acid (FOLVITE) tablet 1 mg  1 mg Oral Daily Tierra Minaya, DO   1 mg at 09/10/18 1202    metFORMIN (GLUCOPHAGE) tablet 1,000 mg  1,000 mg Oral BID  Tierra Minaya, DO   Stopped at 09/10/18 1605    morphine (PF) injection 2 mg  2 mg Intravenous Q2H PRN Aysha Dukes MD        Or    morphine (PF) injection 4 mg  4 mg Intravenous Q2H PRN Aysha Dukes MD   4 mg at 09/10/18 2214    albuterol sulfate  (90 Base) MCG/ACT inhaler 1 puff  1 puff Inhalation Q6H PRN Mervin Wright, DO        sodium chloride flush 0.9 % injection 10 mL  10 mL Intravenous 2 times per day Ricky Wright, DO   10 mL at 09/10/18 2108    sodium chloride flush 0.9 % injection 10 mL  10 mL Intravenous PRN Mervin Wright, DO        magnesium hydroxide (MILK OF MAGNESIA) 400 MG/5ML suspension 30 mL  30 mL Oral Daily PRN Ricky Wright, DO   30 mL at 09/10/18 1419    enoxaparin (LOVENOX) injection 40 mg  40 mg Subcutaneous Daily Mervin Wright DO   40 mg at 09/10/18 8762    acetaminophen (TYLENOL) tablet 650 mg  650 mg Oral Q4H PRN Mervin Wright, DO        glucose (GLUTOSE) 40 % oral gel 15 g  15 g Oral PRN Mervin Wright, DO        dextrose 50 % solution 12.5 g  12.5 g Intravenous PRN Mervin Wright, DO        glucagon (rDNA) injection 1 mg  1 mg Intramuscular PRN Mervin Wright, DO        dextrose 5 % solution  100 mL/hr Intravenous PRN Mervin Wright, DO        insulin lispro (HUMALOG) injection vial 0-12 Units  0-12 Units Subcutaneous TID  Mervin Wright DO   2 Units at 09/10/18 1821    insulin lispro (HUMALOG) injection vial 0-6 Units  0-6 Units Subcutaneous Nightly Mervin Wright DO   4 Units at 09/10/18 2106    lactated ringers infusion   Intravenous Continuous Tierra Minaya, DO 60 mL/hr at 09/10/18 1208      trimethobenzamide (TIGAN) injection 200 mg  200 mg Intramuscular Q6H PRN Aysha Dukes MD   200 mg at 09/10/18 0050    11/20/2017. Technique: Portable AP upright chest radiograph was obtained. Findings: The cardiomediastinal silhouette is stable in size and contours. There is patchy bibasilar airspace disease, likely atelectasis. Costophrenic angles are clear. There is no evidence of pneumothorax. Patchy bibasilar airspace disease, likely atelectasis. Cta Chest W Contrast    Result Date: 9/9/2018  Reading Location: 200 Indication: Shortness of breath Comparison: CT abdomen from 9/8/2018; CT chest from 6/15/2018. Technique: Multidetector CT angiography of the chest was preformed after the administration of intravenous contrast (80 mL of Isovue-370). Coronal, sagittal, and MIP reformatted images were obtained. Automated dose exposure control was used for this exam. FINDINGS: There is adequate opacification of the pulmonary arteries to the subsegmental level. No pulmonary artery filling defect is identified. The main pulmonary artery is enlarged. The central airways are patent. There is no bronchiectasis. There are increased dependent consolidations with air bronchograms within bilateral lower lobes compared to the CT abdomen from 9/8/2018. Redemonstrated are patchy groundglass opacities throughout bilateral lungs, similar to the previous CT chest from 6/15/2018. There is no pneumothorax or pleural effusion. There is a stable 3 mm nodule within the right upper lobe (series 5, image 56). A previously noted subpleural nodule within the left lower lobe is obscured due to consolidation. The heart is stable in size. There is no large pericardial effusion. The thoracic aorta is normal in caliber and enhancement. There is mild scattered prostatic calcification. Redemonstrated are mildly prominent precarinal and subcarinal lymph nodes, slightly increased in size compared to the previous CT chest. There is no axillary lymphadenopathy. Visualized portion of the upper abdomen demonstrates postsurgical changes from prior cholecystectomy. There is a trace amount of perisplenic free fluid. 1. No evidence of pulmonary embolism to the subsegmental level. 2. Increased dependent consolidations within bilateral lower lobes, likely atelectasis versus pneumonia. 3. Redemonstration of patchy ground glass opacities throughout bilateral lungs, likely related to chronic interstitial lung changes. 4. Slight interval increase in size of mediastinal lymphadenopathy compared to the previous CT from 6/15/2018, possibly reactive. 5. Enlarged main pulmonary artery, suggestive of underlying pulmonary artery hypertension. Assessment and Plan     Active Hospital Problems    Diagnosis Date Noted    Ischemic colitis ([facility] [address] [K55.9] 09/08/2018       Assessment    1: Ischemic colitis of transverse colon  2: COPD with home O2 dependence  3: Leukocytosis  4: DMII  5: COPD  6: HLD  7: PVD  8: Hx CHF     Plan  Pt with good tolerance to PO clear liquids; did have a bout of pain that resolved. No further nausea or vomiting. Had a bowel movement yesterday. Pt's breathing remains diminished. She requires 15L through high-flow nasal cannula to keep O2 sats at an acceptable level. She follows Dr. Ray Thompson as an outpatient. He will be consulted on the case, as her O2 levels drop with slight changes in her nasal cannula levels. PT/OT will be consulted. Diet and pain management per Dr. Frey Credit with Surgery.  will be consulted for discharge planning, as pt may require SNF stay to return to prior level of function. Continue care on the [facility].  Input from consultants is appreciated.        Patient has been seen, examined, and discussed with Dr. Shayy Patton DO on rounds    [facility], , PGY-2  9/11/2018  7:21 AM

## 2018-09-11 NOTE — PROGRESS NOTES
[facility]    CH[facility]congestive heart failure) ([facility])     Echo 3/19/15 EF 72%, stage I diastolic    COPD (chronic obstructive pulmonary disease) ([facility])     Diabetes mellitus ([facility])     GERD (gastroesophageal reflux disease)     History of echocardiogram 2017    EF 65%    Hyperlipidemia     Normal nuclear stress test 12    Dr Dee Layne Pneumonia     Psychiatric problem     PVD (peripheral vascular disease) ([facility] [address]    ; Procedure Laterality Date    APPENDECTOMY       SECTION      x3    CHOLECYSTECTOMY      COLONOSCOPY      DOPPLER ECHOCARDIOGRAPHY  2008     EF 70%    EF 70% , MODERATE CONCENTRIC LVH    ECHO COMPL W DOP COLOR FLOW  5/10/2012 EF 67%         ENDOSCOPY, COLON, DIAGNOSTIC      HYSTERECTOMY      OTHER SURGICAL HISTORY  2011    cyst on left neck removed    VASCULAR SURGERY      stents right leg       The admitting diagnosis and active problem list as listed above have been reviewed prior to the initiation of this evaluation. Last time out of bed: yesterday    Precautions: falls , 15L O2  Social history: Patient lives with family son works during day, 3 children nearby  in a 2 story resides first   with 4 steps to enter with  Rail  Tub shower sponge bathes  Prior Level of Function: Patient ambulated with  has aide 2 times per week for housekeeping   Equipment owned: cane, scooter, O2 and Wheeled Walker,       Mentation: alert, cooperative, oriented x 3  and follows directions,      ROM: wfl    STRENGTH:  3+/5  PAIN: (measured on a visual analog scale with 0=no pain and 10=excruciating pain) no c/o.       FUNCTIONAL ASSESSMENT   Bed Mobility- Supine to sit- Moderate assistance            Scooting-Moderate assistance         Sit to supine-na      Transfers-Sit to stand-Moderate assistance       Gait:  Patient ambulated 3 feet using HA with Moderate assistance       Steps:  No steps      Balance: sit-good         stand fair       Edema: no  Endurance: fair

## 2018-09-11 NOTE — PROGRESS NOTES
HPI:  [facility] was seen and evaluated in the presence of her ex- and son today. She states that she is feeling dramatically better but is maintained on 15 L of high flow oxygen. Her abdominal pain has improved and she is now tolerating a diet. We had a long discussion regarding her respiratory status and chronic respiratory issues. We discussed discharge planning at length as well. Patient voiced no new complaints since hospital admission. Questions, answers, and tests reviewed. ROS:  Cardiovascular:   Denies any chest pain, irregular heartbeats, or palpitations. Respiratory:   Chronic shortness of breath that is most noticeable with exertion. Gastrointestinal:   Complete resolution of her presenting abdominal pain. Extremities:   Denies any lower extremity swelling or edema. Neurology:    Denies any headache or focal neurological deficits. No weakness or paresthesia. Derm:    Denies any rashes, ulcers, or excoriations. Denies bruising. Genitourinary:    Denies any urgency, frequency, hematuria. Voiding without difficulty. Physical Exam:    Vitals:    09/11/18 0915   BP: 134/65   Pulse: 102   Resp: 20   Temp: 99.3 °F (37.4 °C)   SpO2: 95%       HEENT:  PERRLA. EOMI. Sclera clear. Buccal mucosa moist. High flow nasal cannula oxygen is in place. Neck:  Supple. Trachea midline. No thyromegaly. No JVD. No bruits. Heart:  Rhythm regular, rate controlled. Systolic murmur. Lungs:  Symmetrical. Clear to auscultation bilaterally. No wheezes. No rhonchi. No rales. Abdomen: Soft. Non-tender. Non-distended. Bowel sounds positive. No organomegaly or masses. No pain on palpation    Extremities:  Peripheral pulses present. No peripheral edema. No ulcers. Neurologic:  Alert x 3. No focal deficit. Cranial nerves grossly intact. Skin:  No petechia. No hemorrhage. No wounds.     CBC with Differential:    Lab Results   Component Value Date    WBC 17.1 09/11/2018    RBC 2.90 09/11/2018

## 2018-09-11 NOTE — PROGRESS NOTES
Occupational Therapy  Occupational Therapy Initial Assessment    Date:2018  Patient Name: Naida Chauhan  MRN: [id number]  : 1950  ROOM #: [id number]/[id number]    Placement recommendation: subacute    Equipment prescriptions needed:  Tub bench , hospital bed    AM-PAC Daily Activity Inpatient   How much help for putting on and taking off regular lower body clothing?: Total  How much help for Bathing?: Total  How much help for Toileting?: Total  How much help for putting on and taking off regular upper body clothing?: A Lot  How much help for taking care of personal grooming?: None  How much help for eating meals?: None  AM-PAC Inpatient Daily Activity Raw Score: 13  AM-PAC Inpatient ADL T-Scale Score : 32.03  ADL Inpatient CMS 0-100% Score: 63.03  ADL Inpatient CMS G-Code Modifier : CL      Diagnosis / Problem List:   1.  Acute intestinal ischemia (HCC)    2. Pain of upper abdomen       Patient Active Problem List   Diagnosis    Chronic diastolic heart failure    Hyperlipidemia    GERD (gastroesophageal reflux disease)    Depression    [facility]    Diabetes mellitus type 2, noninsulin dependent ([facility]    Asthma    [facility]    PVD [facility]peripheral vascular disease) [facility]    Elevated lactic acid level    Microcytic anemia    Tobacco abuse    Orthostatic dizziness    Osteopenia of hip    Other specified disorders of bone density and structure, unspecified thigh    Age-related osteoporosis without current pathological fracture    Ischemic colitis ([facility]      Past Medical History:   Past Medical History:   Diagnosis Date    Arthritis     legs, hands,neck    Asthma     Blood transfusion     [facility]    CHF (congestive heart failure) ([facility])     Echo 3/19/15 EF 53%, stage I diastolic    COPD (chronic obstructive pulmonary disease) ([facility])     Diabetes mellitus ([facility])     GERD (gastroesophageal reflux disease)     History of echocardiogram 05/05/2017    EF 65%    Hyperlipidemia     Normal nuclear stress test 8/6/12    Dr Meghan Omalley Pneumonia     Psychiatric problem     PVD (peripheral vascular disease) ([facility]          The admitting diagnosis and active problem list as listed above have been reviewed prior to the initiation of this evaluation. Nursing cleared the patient for evaluation. Patient is agreeable to evaluation. 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 ,      [address]: reading , facebook and gets up at [address]: 2 story home  4 step entry with railing 1st floor o set up  , tub shower , separate garage , laundry 1st floor, basement unused   PLOF: independent with BADL and IADL with aid 2 times per week for housekeeping , sponge bathes since she cannot step over the tub and is accustomed to getting up at 2am every night    Equipment owned: straight cane, walker and scooter , 02 4 L / min 24/7   Cognition: alert, oriented x 3 and follows 3 step directions    good  Problem solving skills    good  Memory     good  Sequencing     Communication: intact trop dentures no bottom teeth   Visual perceptual skills: intact     Glasses: yes  Edema: no  Sensation: intact  Hand Dominance:  Left X Right     Left Right Comment   Passive range of motion [facility]     Active range of motion [facility]     Muscle Grade 4/5 4/5    /pinch Strength Good  Good         Function Assessment    Status  Goal  Comment    Feeding:  Independent  Independent     Grooming:  Minimal Assist  Supervision    UE dressing:   Moderate Assist  Minimal Assist    LE dressing:  Maximal Assist  Minimal Assist    Bathing:  Maximal Assist 02 drops to 86% on 15 L / min  Minimal Assist    Bed Mobility: Mod  Assist for sit to supine 30  · Low Complexity  · History: Brief history including review of medical records relating to the problem  · Exam: 1-3 performance Deficits  · Assistance/Modification: No assistance or modifications required to perform tasks. No comorbities affecting occupational performance    · Medium Complexity  · History: Expanded review of medical records and additional review of physical, cognitive, or psychosocial history related to current functional performance  · Exam: 3-5 performance deficits  · Assistance/Modification: Min/mod assistance or modifications required to perform tasks. May have comorbidities that affect occupational performance. · High Complexity  · History: Extensive review of medical records and additional review of physical, cognitive, or psychosocial history related to current functional performance. · Exam: 5 or more performance deficits  · Assistance/Modification: Significant assistance or modifications required to perform tasks. Have comorbidities that affect occupational performance.   Electronically signed by Sheba Cadet OT on 9/11/2018 at 10:52 AM    Sheba Cadet OTR/L#[id number]

## 2018-09-12 NOTE — PLAN OF CARE
Problem: Falls - Risk of:  Goal: Will remain free from falls  Will remain free from falls   Outcome: Met This Shift    Goal: Absence of physical injury  Absence of physical injury   Outcome: Met This Shift      Problem: Risk for Impaired Skin Integrity  Goal: Tissue integrity - skin and mucous membranes  Structural intactness and normal physiological function of skin and  mucous membranes.    Outcome: Met This Shift      Problem: Pain - Acute  Goal: Control of acute pain  Control of acute pain      Outcome: Met This Shift      Problem: Pain:  Goal: Pain level will decrease  Pain level will decrease   Outcome: Met This Shift    Goal: Control of chronic pain  Control of chronic pain   Outcome: Met This Shift    Goal: Control of acute pain  Control of acute pain      Outcome: Met This Shift      Problem: Nutrition  Goal: Optimal nutrition therapy  Outcome: Met This Shift      Problem: OXYGENATION/RESPIRATORY FUNCTION  Goal: Patient will maintain patent airway  Outcome: Met This Shift    Goal: Patient will achieve/maintain normal respiratory rate/effort  Respiratory rate and effort will be within normal limits for the patient   Outcome: Not Met This Shift      Problem: HEMODYNAMIC STATUS  Goal: Patient has stable vital signs and fluid balance  Outcome: Not Met This Shift      Problem: FLUID AND ELECTROLYTE IMBALANCE  Goal: Fluid and electrolyte balance are achieved/maintained  Outcome: Met This Shift      Problem: ACTIVITY INTOLERANCE/IMPAIRED MOBILITY  Goal: Mobility/activity is maintained at optimum level for patient  Outcome: Not Met This Shift

## 2018-09-12 NOTE — PROGRESS NOTES
na  Treatment: Pt. Agreeable to ex only, recently rtb, wearing rebreather. Pt. Performed A/A ankle pumps, knee flexion, hip abduction, hip adduction, SLR  x 10 reps. Comment: Monitored SpO2 at 99%. Comment: Call light left by patient. A: Pt. C/o fatigue, tolerating ex, demonstrates muscle weakness. P: Continue with physical therapy   Lane Daily, PTA    GOALS to be met in 3 days. Bed mobility-  Independent                                         Transfers-Sit to stand-Modified Independent                  Gait:  Patient to ambulate 30 feet using Wheeled Walker with Modified Independent                  Steps: Patient to go up and down 4  step(s) using 1 rail(s) with  Supervision        Increase rom in affected joints by 10%  Increase strength in affected mm groups by 1/3 grade  Increase balance to allow for improvement towards functional goals.   Increase endurance to allow for improvement towards functional goals.

## 2018-09-12 NOTE — PROGRESS NOTES
hypoxia maintained on high flow nasal cannula oxygen in the setting of pulmonary fibrosis/interstitial lung disease  3. Non-insulin-dependent diabetes mellitus type II  4. Oxygen-dependent COPD with chronic respiratory failure  5. Hyperlipidemia  6. Peripheral vascular disease  7. Compensated diastolic congestive heart failure    Plan:   The patient remains dependent on 15 L of high flow nasal cannula oxygen. We have been unable to wean any further. The pulmonary team is now providing consultation. IV corticosteroids, nebulized respiratory medications, and antibiotics are being employed. Pulmonary medications have been maximized. Secondary to her reliance on this degree of oxygen, we will look into LTAC placement for ongoing care.     Jossie Bella D.O.  12:51 PM  9/12/2018

## 2018-09-12 NOTE — PROGRESS NOTES
Subjective:    No complaints. She became tachypneic overnight with hypoxia. She was given a dose of Lasix 20 mg IV push with good results at approximately 10 PM. Lasix 20 mg IV push was repeated this morning. Prior to admission, she was seen Bumex 2 mg twice a day. Denies chest pain, SOB. Denies abdominal pain. No nausea, vomiting or diarrhea. Tolerating diet. No family was present.  bumetanide  2 mg Oral Daily    Roflumilast  500 mcg Oral Daily    metFORMIN  1,000 mg Oral BID WC    ciprofloxacin  500 mg Oral 2 times per day    metroNIDAZOLE  500 mg Oral 3 times per day    pantoprazole  40 mg Oral QAM AC    pravastatin  40 mg Oral Daily    QUEtiapine  200 mg Oral Nightly    rOPINIRole  1 mg Oral Nightly    sertraline  100 mg Oral Nightly    ferrous sulfate  325 mg Oral Daily with breakfast    folic acid  1 mg Oral Daily    sodium chloride flush  10 mL Intravenous 2 times per day    enoxaparin  40 mg Subcutaneous Daily    insulin lispro  0-12 Units Subcutaneous TID WC    insulin lispro  0-6 Units Subcutaneous Nightly    ipratropium-albuterol  1 ampule Inhalation Q4H While awake    budesonide  0.5 mg Nebulization BID    formoterol  20 mcg Nebulization BID    methylPREDNISolone  40 mg Intravenous Q12H       morphine **OR** morphine, albuterol sulfate HFA, sodium chloride flush, magnesium hydroxide, acetaminophen, glucose, dextrose, glucagon (rDNA), dextrose, trimethobenzamide    Objective:    BP (!) 159/69   Pulse 91   Temp 98.4 °F (36.9 °C) (Oral)   Resp 26   Ht 5' 5\" (1.651 m)   Wt 148 lb 2.4 oz (67.2 kg)   SpO2 90%   BMI 24.65 kg/m²      Gen.:  She is alert, cooperative, in no acute distress on a 100% nonrebreather. Heart:  RRR without murmur. Lungs:  Physical rales present bilaterally with scant wheezes more noticeable in the bases. Abd: Bowel sounds present, soft, nontender. No mass, HSM, guarding or rebound.   Extrem:  No clubbing, cyanosis, or edema  Neuro: Cranial nerves II through XII appear to be grossly intact with no focal neurologic deficits. CBC:   Lab Results   Component Value Date    WBC 16.9 09/12/2018    RBC 3.06 09/12/2018    HGB 7.8 09/12/2018    HCT 24.6 09/12/2018    MCV 80.4 09/12/2018    MCH 25.5 09/12/2018    MCHC 31.7 09/12/2018    RDW 15.7 09/12/2018     09/12/2018    MPV 10.0 09/12/2018     BMP:    Lab Results   Component Value Date     09/12/2018    K 3.6 09/12/2018    K 3.5 09/09/2018    CL 99 09/12/2018    CO2 28 09/12/2018    BUN 10 09/12/2018    LABALBU 2.5 09/12/2018    LABALBU 4.0 05/10/2012    CREATININE 0.4 09/12/2018    CALCIUM 7.7 09/12/2018    GFRAA >60 09/12/2018    LABGLOM >60 09/12/2018    GLUCOSE 214 09/12/2018    GLUCOSE 169 05/14/2012       XR CHEST PORTABLE [[id number]] Resulted: 09/11/18 0653     Order Status: Completed Updated: 09/11/18 0655     Narrative:       LOCATION: 200    EXAM: XR CHEST PORTABLE    COMPARISON: 9/8/2018    HISTORY: Shortness of breath    TECHNIQUE: Single frontal view of the chest was obtained. FINDINGS:      SUPPORT DEVICES: None. LUNGS: Bilateral lower lobe parenchymal opacities are unchanged. PLEURA: No effusions or pneumothorax. LUNG VOLUMES: Satisfactory inspirator effort. MEDIASTINAL STRUCTURES: No lymphadenopathy. Normal aortic contour. HEART SIZE: Normal.     BONES AND SOFT TISSUES: No fracture or soft tissue abnormality.      Impression:       Stable lower lobe airspace disease.          Patient Active Problem List   Diagnosis    Chronic diastolic heart failure    Hyperlipidemia    GERD (gastroesophageal reflux disease)    Depression    Aspiration pneumonia ([address]    Diabetes mellitus type 2, noninsulin dependent ([address]    Asthma    COPD (chronic obstructive pulmonary disease) ([address]    [facility]    Elevated lactic acid level    Microcytic anemia    Tobacco abuse    Orthostatic dizziness    Osteopenia of hip    Other specified disorders of

## 2018-09-12 NOTE — PLAN OF CARE
Problem: Falls - Risk of:  Goal: Will remain free from falls  Will remain free from falls   Outcome: Ongoing    Goal: Absence of physical injury  Absence of physical injury   Outcome: Ongoing      Problem: Risk for Impaired Skin Integrity  Goal: Tissue integrity - skin and mucous membranes  Structural intactness and normal physiological function of skin and  mucous membranes.    Outcome: Ongoing      Problem: Pain - Acute  Goal: Control of acute pain  Control of acute pain      Outcome: Ongoing      Problem: Pain:  Goal: Pain level will decrease  Pain level will decrease   Outcome: Ongoing    Goal: Control of chronic pain  Control of chronic pain   Outcome: Ongoing    Goal: Control of acute pain  Control of acute pain      Outcome: Ongoing      Problem: OXYGENATION/RESPIRATORY FUNCTION  Goal: Patient will maintain patent airway  Outcome: Ongoing    Goal: Patient will achieve/maintain normal respiratory rate/effort  Respiratory rate and effort will be within normal limits for the patient   Outcome: Ongoing      Problem: HEMODYNAMIC STATUS  Goal: Patient has stable vital signs and fluid balance  Outcome: Ongoing      Problem: FLUID AND ELECTROLYTE IMBALANCE  Goal: Fluid and electrolyte balance are achieved/maintained  Outcome: Ongoing      Problem: ACTIVITY INTOLERANCE/IMPAIRED MOBILITY  Goal: Mobility/activity is maintained at optimum level for patient  Outcome: Ongoing

## 2018-09-13 NOTE — CARE COORDINATION
Discharge Planning: Per binta Franks received and able to accept, SW notified Jared Jensen NP, who is notifying Dr. Domingo Freire. Electronically signed by HIRAL Murcia on 9/13/2018 at 12:18 PM    Addendum: This  met with pt and , both in agreement with [facility].   Electronically signed by HIRAL Murcia on 9/13/2018 at 1:16 PM

## 2018-09-13 NOTE — PROGRESS NOTES
Spoke in great detail about pt full code status. Pt states if something would happen to her she does want all heroic measures done to keep her alive.

## 2018-09-13 NOTE — PROGRESS NOTES
Dr. Ashlee Davis called, made aware that patient had oxygen 15 liters and non rebreather through out the night and afternoons, aerosel treatment given, still on non rebreather but oxygen sat better at 89%. Orders given.

## 2018-09-13 NOTE — PROGRESS NOTES
Physical Therapy  Physical Therapy  Daily Treatment Note  9/13/2018  0621/0621-02                      Johny Roy   [id number]                              1950    Patient Active Problem List   Diagnosis    Chronic diastolic heart failure    Hyperlipidemia    GERD (gastroesophageal reflux disease)    Depression    Aspiration pneumonia (HCC)    Diabetes mellitus type 2, noninsulin dependent (HCC)    Asthma    COPD (chronic obstructive pulmonary disease) (HCC)    [facility]    Elevated lactic acid level    Microcytic anemia    Tobacco abuse    Orthostatic dizziness    Osteopenia of hip    Other specified disorders of bone density and structure, unspecified thigh    Age-related osteoporosis without current pathological fracture    Ischemic colitis ([facility]       Recommendation for discharge: Subacute  Equipment prescriptions needed: to be determined    AM-[facility] Mobility Inpatient   How much difficulty turning over in bed?: A Little  How much difficulty sitting down on / standing up from a chair with arms?: A Lot  How much difficulty moving from lying on back to sitting on side of bed?: A Little  How much help from another person moving to and from a bed to a chair?: A Little  How much help from another person needed to walk in hospital room?: A Little  How much help from another person for climbing 3-5 steps with a railing?: A Lot  AM-PAC Inpatient Mobility Raw Score : 16  AM-PAC Inpatient T-Scale Score : 40.78  Mobility Inpatient CMS 0-100% Score: 54.16  Mobility Inpatient CMS G-Code Modifier : CK    Precautions: falls, 15L O2    S: Patient cleared by nursing for treatment. Patient is agreeable to treatment. Pt c/o difficulty breathing and was currently on non-rebreather. Nursing reported okay to get patient up to chair. Pain status: (measured on a visual analog scale with 0=no pain and 10=excruciating pain) 0/10.    O: Pt was instructed in and performed the

## 2018-09-13 NOTE — PROGRESS NOTES
throughout. Positive for end expiratory wheezing. No rhonchi. No rales. Abdomen: Soft. Non-tender. Non-distended. Bowel sounds positive. No organomegaly or masses. No pain on palpation    Extremities:  Peripheral pulses present. No peripheral edema. No ulcers. Neurologic:  Alert x 3. No focal deficit. Cranial nerves grossly intact. Skin:  No petechia. No hemorrhage. No wounds.     CBC with Differential:    Lab Results   Component Value Date    WBC 16.6 09/13/2018    RBC 3.12 09/13/2018    HGB 7.8 09/13/2018    HCT 25.3 09/13/2018     09/13/2018    MCV 81.1 09/13/2018    MCH 25.0 09/13/2018    MCHC 30.8 09/13/2018    RDW 15.6 09/13/2018    SEGSPCT 61 02/24/2014    LYMPHOPCT 1.8 09/10/2018    MONOPCT 2.7 09/10/2018    BASOPCT 0.2 09/10/2018    MONOSABS 0.70 09/10/2018    LYMPHSABS 0.47 09/10/2018    EOSABS 0.00 09/10/2018    BASOSABS 0.00 09/10/2018     BMP:    Lab Results   Component Value Date     09/13/2018    K 3.9 09/13/2018    K 3.5 09/09/2018    CL 99 09/13/2018    CO2 28 09/13/2018    BUN 11 09/13/2018    LABALBU 2.5 09/13/2018    LABALBU 4.0 05/10/2012    CREATININE 0.4 09/13/2018    CALCIUM 7.7 09/13/2018    GFRAA >60 09/13/2018    LABGLOM >60 09/13/2018    GLUCOSE 235 09/13/2018    GLUCOSE 169 05/14/2012       Other Data:      Intake/Output Summary (Last 24 hours) at 09/13/18 1049  Last data filed at 09/13/18 0542   Gross per 24 hour   Intake              370 ml   Output             1400 ml   Net            -1030 ml         Scheduled Medications:   ipratropium-albuterol  1 ampule Inhalation Q4H    bumetanide  2 mg Oral Daily    potassium chloride  20 mEq Oral Daily    Roflumilast  500 mcg Oral Daily    metFORMIN  1,000 mg Oral BID WC    ciprofloxacin  500 mg Oral 2 times per day    metroNIDAZOLE  500 mg Oral 3 times per day    pantoprazole  40 mg Oral QAM AC    pravastatin  40 mg Oral Daily    QUEtiapine  200 mg Oral Nightly    rOPINIRole  1 mg Oral Nightly   

## 2018-09-14 NOTE — PLAN OF CARE
Problem: Falls - Risk of:  Goal: Will remain free from falls  Will remain free from falls   Outcome: Ongoing      Problem: Risk for Impaired Skin Integrity  Goal: Tissue integrity - skin and mucous membranes  Structural intactness and normal physiological function of skin and  mucous membranes.    Outcome: Ongoing      Problem: Pain:  Goal: Pain level will decrease  Pain level will decrease   Outcome: Ongoing      Problem: OXYGENATION/RESPIRATORY FUNCTION  Goal: Patient will maintain patent airway  Outcome: Met This Shift

## 2018-09-14 NOTE — PROGRESS NOTES
Pulmonary (Dr. Kaveh Cárdenas)    9/14/2018 8:21 AM  Subjective:   Admit Date: 9/8/2018  PCP: Annette Simons DO  Interval History:  Medications and notes reviewed. Events of night noted, pt with desaturation yesterday, required increased oxygen support. I discussed with her yesterday her end of life wishes and she said she does not want ventilator or any machines, even if it means death without them. On 15 lpm nasal cannula this am.  Much better today. No bronchospasms noted.      Diet: Dietary Nutrition Supplements: Standard High Calorie Oral Supplement  DIET LOW FIBER;        Data:   Scheduled Meds:   ipratropium-albuterol  1 ampule Inhalation Q4H    bumetanide  2 mg Oral Daily    potassium chloride  20 mEq Oral Daily    Roflumilast  500 mcg Oral Daily    metFORMIN  1,000 mg Oral BID WC    ciprofloxacin  500 mg Oral 2 times per day    metroNIDAZOLE  500 mg Oral 3 times per day    pantoprazole  40 mg Oral QAM AC    pravastatin  40 mg Oral Daily    QUEtiapine  200 mg Oral Nightly    rOPINIRole  1 mg Oral Nightly    sertraline  100 mg Oral Nightly    ferrous sulfate  325 mg Oral Daily with breakfast    folic acid  1 mg Oral Daily    sodium chloride flush  10 mL Intravenous 2 times per day    enoxaparin  40 mg Subcutaneous Daily    insulin lispro  0-12 Units Subcutaneous TID WC    insulin lispro  0-6 Units Subcutaneous Nightly    budesonide  0.5 mg Nebulization BID    formoterol  20 mcg Nebulization BID    methylPREDNISolone  40 mg Intravenous Q12H     Continuous Infusions:   dextrose       PRN Meds:morphine **OR** morphine, albuterol sulfate HFA, sodium chloride flush, magnesium hydroxide, acetaminophen, glucose, dextrose, glucagon (rDNA), dextrose, trimethobenzamide      Intake/Output Summary (Last 24 hours) at 09/14/18 0821  Last data filed at 09/13/18 2236   Gross per 24 hour   Intake              360 ml   Output                0 ml   Net              360 ml       CBC:   Recent Labs 09/12/18   0533  09/13/18   0529  09/14/18   0537   WBC  16.9*  16.6*  18.6*   HGB  7.8*  7.8*  9.3*   PLT  338  321  339     BMP:    Recent Labs      09/12/18   0533  09/13/18   0529  09/14/18   0537   NA  136  137  142   K  3.6  3.9  4.1   CL  99  99  100   CO2  28  28  31*   BUN  10  11  12   CREATININE  0.4*  0.4*  0.5   GLUCOSE  214*  235*  240*     Hepatic:   Recent Labs      09/12/18   0533  09/13/18   0529  09/14/18   0537   AST  11  18  11   ALT  14  16  14   BILITOT  0.2  <0.2  <0.2   ALKPHOS  83  83  77     Troponin: No results for input(s): TROPONINI in the last 72 hours. BNP: No results for input(s): BNP in the last 72 hours. Lipids: No results for input(s): CHOL, HDL in the last 72 hours. Invalid input(s): LDLCALCU    INR: No results for input(s): INR in the last 72 hours. ABG:   Lab Results   Component Value Date    PH 7.467 09/14/2018    PCO2 40.2 09/14/2018    PO2 76.7 09/14/2018         -----------------------------------------------------------------  RAD: Xr Abdomen (kub) (single Ap View)    Result Date: 9/9/2018  Location:100 Exam: XR ABDOMEN (KUB) (SINGLE AP VIEW) Indications: Abdominal pain and distention Findings: Portable AP supine views of the abdomen were obtained. Prior study dated 4/18/2011 was reviewed. A nonspecific nonobstructive bowel gas pattern is seen. There is contrast within the ascending colon and within a tubular structure over the right sacral body which is felt to represent the appendix. Contrast is present within the moderately distended urinary bladder. Vascular calcifications are identified. Degenerative changes of the lower thoracic spine, the lumbar spine, and both hips are present. 1. Nonspecific nonobstructive bowel gas pattern. 2. Contrast within bowel and urinary bladder are felt to be secondary to previous administration of oral and intravenous contrast exam for CT of the abdomen and pelvis study dated 9/8/2018.  3. Degenerative changes    Ct Abdomen Pelvis W Iv Contrast Additional Contrast? Oral    Addendum Date: 9/8/2018    Upon further review, there is subtle pneumatosis of the transverse colon with linear areas of air within the mesentery, likely related to portal venous gas. Findings are suspicious for ischemic colitis of the transverse colon. Findings were reviewed with Dr. Elizabeth Lira of the Emergency Department on 9/8/2018 at 0956 hours. Result Date: 9/8/2018  Location: 200 Indication: Abdominal pain. Comparison: CT abdomen/pelvis from 5/18/2018. Technique: Multidetector CT imaging of the abdomen and pelvis was performed after the administration of intravenous contrast (80 cc of Isovue-370). Oral contrast was administered. Coronal and sagittal reformatted images were obtained. Automated dose exposure control was used for this exam. FINDINGS: Lower thorax:  Limited evaluation of the lower chest demonstrates mild dependent changes within bilateral lower lobes. There is no pleural effusion. Peritoneum: There is no free fluid within the abdomen or pelvis. There is no pneumoperitoneum. Hepatobiliary: The liver is normal in morphology and attenuation. The gallbladder is surgically absent. The common bile duct is normal in caliber. Pancreas: Unremarkable. Spleen: Unremarkable. Gastrointestinal: The distal esophagus, stomach, duodenum are normal in appearance. The small bowel loops are normal in caliber. The appendix is identified and is unremarkable. The colon is normal in caliber and grossly unremarkable. Adrenal glands: Unremarkable. Genitourinary: Bilateral kidneys are normal in morphology and demonstrate symmetric enhancement. There is no renal calculus, hydronephrosis, or hydroureter bilaterally. The urinary bladder is unremarkable. The uterus is surgically absent  Vascular: There is scattered atherosclerotic disease. The abdominal aorta is normal in caliber. Lymph nodes: No abdominal, retroperitoneal, or pelvic lymphadenopathy.  Bones: No suspicious lytic or blastic osseous lesion. Soft tissues: Unremarkable. No acute intra-abdominal or pelvic pathology. Xr Chest Portable    Result Date: 9/8/2018  Reading location: 200 Indication: Epigastric pain. Comparison: Chest radiograph from 11/20/2017. Technique: Portable AP upright chest radiograph was obtained. Findings: The cardiomediastinal silhouette is stable in size and contours. There is patchy bibasilar airspace disease, likely atelectasis. Costophrenic angles are clear. There is no evidence of pneumothorax. Patchy bibasilar airspace disease, likely atelectasis. Cta Chest W Contrast    Result Date: 9/9/2018  Reading Location: 200 Indication: Shortness of breath Comparison: CT abdomen from 9/8/2018; CT chest from 6/15/2018. Technique: Multidetector CT angiography of the chest was preformed after the administration of intravenous contrast (80 mL of Isovue-370). Coronal, sagittal, and MIP reformatted images were obtained. Automated dose exposure control was used for this exam. FINDINGS: There is adequate opacification of the pulmonary arteries to the subsegmental level. No pulmonary artery filling defect is identified. The main pulmonary artery is enlarged. The central airways are patent. There is no bronchiectasis. There are increased dependent consolidations with air bronchograms within bilateral lower lobes compared to the CT abdomen from 9/8/2018. Redemonstrated are patchy groundglass opacities throughout bilateral lungs, similar to the previous CT chest from 6/15/2018. There is no pneumothorax or pleural effusion. There is a stable 3 mm nodule within the right upper lobe (series 5, image 56). A previously noted subpleural nodule within the left lower lobe is obscured due to consolidation. The heart is stable in size. There is no large pericardial effusion. The thoracic aorta is normal in caliber and enhancement. There is mild scattered prostatic calcification.  Redemonstrated are mildly prominent

## 2018-09-14 NOTE — PLAN OF CARE
Problem: Falls - Risk of:  Goal: Will remain free from falls  Will remain free from falls   Outcome: Met This Shift      Problem: OXYGENATION/RESPIRATORY FUNCTION  Goal: Patient will maintain patent airway  Outcome: Ongoing    Goal: Patient will achieve/maintain normal respiratory rate/effort  Respiratory rate and effort will be within normal limits for the patient   Outcome: Ongoing

## 2018-09-14 NOTE — PROGRESS NOTES
guarding or rebound. Extrem:  No clubbing, cyanosis, or edema  Neuro: Cranial nerves II through XII appear to be grossly intact with no focal neurologic deficits. CBC:   Lab Results   Component Value Date    WBC 18.6 09/14/2018    RBC 3.60 09/14/2018    HGB 9.3 09/14/2018    HCT 30.3 09/14/2018    MCV 84.2 09/14/2018    MCH 25.8 09/14/2018    MCHC 30.7 09/14/2018    RDW 16.3 09/14/2018     09/14/2018    MPV 9.8 09/14/2018     BMP:    Lab Results   Component Value Date     09/14/2018    K 4.1 09/14/2018    K 3.5 09/09/2018     09/14/2018    CO2 31 09/14/2018    BUN 12 09/14/2018    LABALBU 2.4 09/14/2018    LABALBU 4.0 05/10/2012    CREATININE 0.5 09/14/2018    CALCIUM 7.8 09/14/2018    GFRAA >60 09/14/2018    LABGLOM >60 09/14/2018    GLUCOSE 240 09/14/2018    GLUCOSE 169 05/14/2012     XR CHEST 1 VW [[id number]] Resulted: 09/13/18 1722   Updated: 09/13/18 1724    Narrative:     Reading location: 200    Indication: Shortness of breath. Comparison: Chest radiograph from 9/11/2018. Technique: Portable upright chest radiograph was obtained. Findings: The cardiomediastinal silhouette is stable in size and contours. There  is stable patchy bibasilar airspace opacity and. Costophrenic angles  are grossly clear. There is no evidence of pneumothorax. Impression:     Stable patchy bibasilar airspace disease.         Echo Complete [[id number]] Collected: 09/11/18 1316   Updated: 09/13/18 1335    Narrative:     Transthoracic Echocardiography Report (TTE)     Demographics      Patient Name       Sherrill POZO Gender               Female      Medical Record      [id number]     Room Number          5636  Charl[facility] Palencia      Account #           [de-identified]    Procedure Date       09/11/2018      Corporate ID                     Ordering Physician   Diaz Monroe DO      Accession Number    [id number]    Referring Physician      Date of Birth       1950   Sonographer          Richard Montague RDCS      Age

## 2018-09-14 NOTE — PROGRESS NOTES
Physical Therapy  Physical Therapy  Daily Treatment Note  9/14/2018  0621/0621-02                      Lorraine Arevalo   [id number]                              1950    Patient Active Problem List   Diagnosis    Chronic diastolic heart failure    Hyperlipidemia    GERD (gastroesophageal reflux disease)    Depression    [facility] pneumonia ([facility]    Diabetes mellitus type 2, noninsulin dependent ([facility]    Asthma    [facility]    PVD (peripheral vascular disease) ([facility])    Elevated lactic acid level    Microcytic anemia    Tobacco abuse    Orthostatic dizziness    Osteopenia of hip    Other specified disorders of bone density and structure, unspecified thigh    Age-related osteoporosis without current pathological fracture    Ischemic colitis ([facility]       Recommendation for discharge: Subacute  Equipment prescriptions needed: to be determined    AM-[facility] Mobility Inpatient   How much difficulty turning over in bed?: A Little  How much difficulty sitting down on / standing up from a chair with arms?: A Lot  How much difficulty moving from lying on back to sitting on side of bed?: A Lot  How much help from another person moving to and from a bed to a chair?: A Lot  How much help from another person needed to walk in hospital room?: A Lot  How much help from another person for climbing 3-5 steps with a railing?: Total  AM-PAC Inpatient Mobility Raw Score : 12  AM-PAC Inpatient T-Scale Score : 35.33  Mobility Inpatient CMS 0-100% Score: 68.66  Mobility Inpatient CMS G-Code Modifier : CL    Precautions: falls, 15L O2    S: Patient cleared by nursing for treatment. Patient is agreeable to treatment. Pt c/o difficulty breathing and was currently on non-rebreather. Nursing reported okay to get patient up to chair. Pain status: (measured on a visual analog scale with 0=no pain and 10=excruciating pain) 0/10.    O: Pt was instructed in and performed the following:   Bed Mobility- Supine to sit- Minimal assist      Scooting- Minimal assist    Sit to supine- Minimal assist    Transfers-sit to stand- Moderate assist     Gait: Pt ambulated 2 x 10 feet using wheeled walker with Minimal assist x 1. V/C for safety, breathing technique, line management, and upright posture. Pt ambulated with nasal canula and 15L O2. Steps: NA  Treatment: Pt ambulated in room and up to chair. Pt sat in chair and performed seated ankle pumps, LAQ, and marching x 10 reps. SpO2 at 83 on 8 Liters, increased to 15L for amb, SpO2 89%, recovered to 91%, put back at 8L on wall O2. Comment: Call light left by patient. Alarm activated. A: Pt with increased amb distance, pulse ox dropped as above. Demonstrates low endurance level and muscle weakness. P: Continue with physical therapy   Jim Seymour PTA    GOALS to be met in 3 days. Bed mobility-  Independent                                         Transfers-Sit to stand-Modified Independent                  Gait:  Patient to ambulate 30 feet using Wheeled Walker with Modified Independent                  Steps: Patient to go up and down 4  step(s) using 1 rail(s) with  Supervision        Increase rom in affected joints by 10%  Increase strength in affected mm groups by 1/3 grade  Increase balance to allow for improvement towards functional goals.   Increase endurance to allow for improvement towards functional goals.

## 2018-09-15 NOTE — DISCHARGE SUMMARY
[address]                                 DISCHARGE SUMMARY    PATIENT NAME: Sarah Green                       :        1950  MED REC NO:   [id number]                            ROOM:       8484  ACCOUNT NO:   [de-identified]                           ADMIT DATE: 2018  PROVIDER:     Delmy Frank DO                  DISCHARGE DATE: 2018    ADMITTING DIAGNOSIS:  Abdominal pain. FINAL DISCHARGE DIAGNOSIS:  Abdominal pain secondary to ishemic colitis of  transverse colon. SECONDARY DISCHARGE DIAGNOSES:  Non-insulin-dependent diabetes mellitus  type 2; COPD with chronic respiratory failure, on oxygen; hyperlipidemia;  peripheral arterial disease; complicated diastolic congestive heart  failure. COMPLICATIONS:  Acute-on-chronic respiratory failure with progressive  hypoxemia, requiring high-flow oxygen therapy in the setting of pulmonary  fibrosis/interstitial lung disease. OPERATION:  None. CONSULTATIONS OBTAINED:  Dr. Kassie Barry, Dr. Maikel Marie, Dr. Beba Vu. No OMT was  given. ADMITTING PHYSICIAN:  Dr. Windy Trotter. CHIEF COMPLAINT AND HISTORY OF CHIEF COMPLAINT:  This is a 69-year-old  white female, who was admitted to [address] [place] [address].  The patient was  admitted to the hospital on 2018. The patient presented to the  hospital here through the emergency room complaining of abdominal pain and  emesis. The patient does have a past medical history of peripheral  vascular disease, hyperlipidemia, diabetes mellitus, COPD, CHF and  gastroesophageal reflux disease. The patient presented to the hospital  here complaining of abdominal pain and emesis that began yesterday morning. CAT scan at that time did show some subtle pneumatosis of the transverse  colon, likely related to portal venous gas suspicious for ischemic colitis. General Surgery was consulted at that time.   The patient was

## 2018-09-26 NOTE — PROGRESS NOTES
[address] [facility]    Attending Physician Statement:  Marcelino Speaks DO    I have discussed the case, including pertinent history and exam findings with the resident. I agree with the assessment, plan and orders as documented by the resident. Patient is here for a Hospital Follow Up Appointment. Remainder of medical problems as per resident note.

## 2018-10-02 NOTE — TELEPHONE ENCOUNTER
Patient phoned, would like to have DME order for wheelchair.   If you can do the order with the documentation I can fax to [facility].  Thanks

## 2018-10-21 NOTE — ED PROVIDER NOTES
ED Attending  CC: Diana     Department of Emergency Medicine   ED  Provider Note  Admit Date/Time: 10/21/2018 10:59 AM  ED Bed: 24/24  Chief Complaint:       Abdominal Pain (since yesterday.)    History of Present Illness   Source of history provided by:  patient. History/Exam Limitations: none. Malika Gill is a 77 y.o. old female who has a past medical history of:   Past Medical History:   Diagnosis Date    Arthritis     legs, hands,neck    Asthma     [facility]    CHF [facility]congestive heart failure) ([facility])     Echo 3/19/15 EF 83%, stage I diastolic    COPD (chronic obstructive pulmonary disease) ([facility])     Diabetes mellitus ([facility])     GERD (gastroesophageal reflux disease)     History of echocardiogram 05/05/2017    EF 65%    Hyperlipidemia     Normal nuclear stress test 8/6/12    Dr Dorene Greenfield      Pneumonia     Psychiatric problem     [facility]VD [facility]per[facility]     Type 2 diabetes mellitus, without long-term current use of insulin ([facility] 2/8/2012      presents to the emergency department by private vehicle, for complaints of gradual onset, still present, constant pressure pain in the entire abdomen without radiation which began 1 day(s) prior to arrival.   There has been similar episodes in the past and she was recently admitted for the same exact symptoms and was told she had \"intestinal ischemia. \"  Since onset the symptoms have been persistent and worsening. The pain is associated with nothing pertinent. The pain is aggravated by PO intake and pressure on her abdomen and relieved by nothing. Patient states that she \"never\" has a normal bowel movement, but did have a bowel movent yesterday. There has been NO back pain, chills, cloudy urine, constipation, diarrhea, dysuria, headache, hematuria, sweating, urinary frequency, urinary incontinence, urinary urgency, vaginal discharge, vaginal itching or vomiting.      ROS   Pertinent positives and negatives are stated within HPI, all other systems reviewed and are negative. Past Surgical History:   Procedure Laterality Date    APPENDECTOMY       SECTION      x3    CHOLECYSTECTOMY      COLONOSCOPY      DOPPLER ECHOCARDIOGRAPHY  2008     EF 70%    EF 70% , MODERATE CONCENTRIC LVH    ECHO COMPL W DOP COLOR FLOW  5/10/2012 EF 67%         ENDOSCOPY, COLON, DIAGNOSTIC      HYSTERECTOMY      OTHER SURGICAL HISTORY  2011    cyst on left neck removed    VASCULAR SURGERY      stents right leg     Social History:  reports that she quit smoking about 6 weeks ago. Her smoking use included Cigarettes. She has a 23.00 pack-year smoking history. She has never used smokeless tobacco. She reports that she does not drink alcohol or use drugs. Family History: family history includes Breast Cancer in her mother; Clotting Disorder in her father; Heart Attack in her sister; Heart Disease in her sister; Milka Shoemaker in her brother. Allergies: Patient has no known allergies. Physical Exam            ED Triage Vitals   BP Temp Temp Source Pulse Resp SpO2 Height Weight   10/21/18 1120 10/21/18 1128 10/21/18 1120 10/21/18 1120 10/21/18 1120 10/21/18 1120 -- --   111/68 98.5 °F (36.9 °C) Oral 102 18 100 %        Oxygen Saturation Interpretation: Normal.    · General Appearance/Constitutional:  Alert, development consistent with age  · HEENT:  NC/NT. Airway patent. · Neck:  Supple. No lymphadenopathy. · Respiratory:  No retractions. Lungs Clear to auscultation and breath sounds equal.  · CV:  Regular rate and rhythm. · GI:  General Appearance: normal.         Bowel sounds: hypoactive bowel sounds. Distension:  Moderate, possibly due to body habitus. Tenderness: moderate tenderness is present in the entire abdomen, no rebound tenderness, guarding is present in the entire abdomen. Liver: non-tender. Spleen:  Not palpated. Pulsatile Mass: absent.     · Back:

## 2018-10-21 NOTE — ED NOTES
Fernanda Alvarado is the receiving physician.       [facility], RN  10/21/18 4707

## 2018-10-21 NOTE — ED NOTES
Report called to [facility] ED. [phone]. Pt departed via [facility] on stretcher.       Hunter Carr RN  10/21/18 0699

## 2018-12-15 PROBLEM — E43 SEVERE PROTEIN-CALORIE MALNUTRITION (HCC): Status: ACTIVE | Noted: 2018-01-01

## 2018-12-15 PROBLEM — J18.9 PNEUMONIA OF BOTH LOWER LOBES: Status: ACTIVE | Noted: 2018-01-01

## 2018-12-15 PROBLEM — N39.0 ACUTE UTI: Status: ACTIVE | Noted: 2018-01-01

## 2018-12-15 PROBLEM — A41.9 SEPSIS (HCC): Status: ACTIVE | Noted: 2018-01-01

## 2018-12-15 NOTE — ED PROVIDER NOTES
placed or performed during the hospital encounter of 12/14/18   CBC auto differential   Result Value Ref Range    WBC 41.7 (H) 4.5 - 11.5 E9/L    RBC 3.50 3.50 - 5.50 E12/L    Hemoglobin 10.1 (L) 11.5 - 15.5 g/dL    Hematocrit 32.0 (L) 34.0 - 48.0 %    MCV 91.4 80.0 - 99.9 fL    MCH 28.9 26.0 - 35.0 pg    MCHC 31.6 (L) 32.0 - 34.5 %    RDW 15.5 (H) 11.5 - 15.0 fL    Platelets 479 577 - 834 E9/L    MPV 10.1 7.0 - 12.0 fL    Neutrophils % 92.2 (H) 43.0 - 80.0 %    Lymphocytes % 4.3 (L) 20.0 - 42.0 %    Monocytes % 2.6 2.0 - 12.0 %    Eosinophils % 0.9 0.0 - 6.0 %    Basophils % 0.3 0.0 - 2.0 %    Neutrophils # 38.36 (H) 1.80 - 7.30 E9/L    Lymphocytes # 1.67 1.50 - 4.00 E9/L    Monocytes # 1.25 (H) 0.10 - 0.95 E9/L    Eosinophils # 0.38 0.05 - 0.50 E9/L    Basophils # 0.00 0.00 - 0.20 E9/L    Anisocytosis 1+     Polychromasia 2+     Poikilocytes 2+     [place] Cells 1+     Ovalocytes 1+     Target Cells 1+    Comprehensive Metabolic Panel   Result Value Ref Range    Sodium 130 (L) 132 - 146 mmol/L    Potassium 6.1 (H) 3.5 - 5.0 mmol/L    Chloride 99 98 - 107 mmol/L    CO2 18 (L) 22 - 29 mmol/L    Anion Gap 13 7 - 16 mmol/L    Glucose 146 (H) 74 - 99 mg/dL    BUN 20 8 - 23 mg/dL    CREATININE 0.8 0.5 - 1.0 mg/dL    GFR Non-African American >60 >=60 mL/min/1.73    GFR African American >60     Calcium 9.1 8.6 - 10.2 mg/dL    Total Protein 7.4 6.4 - 8.3 g/dL    Alb 2.7 (L) 3.5 - 5.2 g/dL    Total Bilirubin 0.3 0.0 - 1.2 mg/dL    Alkaline Phosphatase 122 (H) 35 - 104 U/L    ALT 10 0 - 32 U/L    AST 14 0 - 31 U/L   Lipase   Result Value Ref Range    Lipase 26 13 - 60 U/L   Lactic Acid, Plasma   Result Value Ref Range    Lactic Acid 2.6 (H) 0.5 - 2.2 mmol/L       RADIOLOGY:  CT ABDOMEN PELVIS W IV CONTRAST Additional Contrast? Oral    (Results Pending)     CT abdomen/pelvis with IV and oral contrast (ONRAD read): Bilateral lower lobe infiltrates, greater on the right. Partial resection of the right colon.  Colonic air fluid levels may represent gastroenteritis versus diarrhea. No small bowel obstruction.        ------------------------- NURSING NOTES AND VITALS REVIEWED ---------------------------  Date / Time Roomed:  12/14/2018  9:55 PM  ED Bed Assignment:  09/09    The nursing notes within the ED encounter and vital signs as below have been reviewed. Patient Vitals for the past 24 hrs:   BP Temp Temp src Pulse Resp SpO2 Height Weight   12/15/18 0205 (!) 99/59 97.9 °F (36.6 °C) Oral 104 15 99 % - -   12/14/18 2200 (!) 97/59 97.8 °F (36.6 °C) Oral 104 20 95 % 5' 6\" (1.676 m) 109 lb (49.4 kg)       Oxygen Saturation Interpretation: Normal    ------------------------------------------ PROGRESS NOTES ------------------------------------------  Re-evaluation(s):  Time: 8735. Patients symptoms are improving  Repeat physical examination is not changed    Counseling:  I have spoken with the patient and discussed todays results, in addition to providing specific details for the plan of care and counseling regarding the diagnosis and prognosis. Their questions are answered at this time and they are agreeable with the plan of admission.    --------------------------------- ADDITIONAL PROVIDER NOTES ---------------------------------  Consultations:  Time: [phone]. Spoke with Dr. Tonny Velazquez. Discussed case. They will admit the patient. This patient's ED course included: a personal history and physicial examination, re-evaluation prior to disposition, multiple bedside re-evaluations, IV medications, cardiac monitoring and continuous pulse oximetry    This patient has remained hemodynamically stable during their ED course. Diagnosis:  1. Sepsis, due to unspecified organism ([address]    2. Pneumonia of both lower lobes due to infectious organism ([address]    3. Left lower quadrant pain    4. Non-intractable vomiting with nausea, unspecified vomiting type    5.  Diarrhea, unspecified type        Disposition:  Patient's disposition: Admit to

## 2018-12-15 NOTE — PLAN OF CARE
Problem: Pain:  Goal: Pain level will decrease  Pain level will decrease   Outcome: Ongoing    Goal: Control of acute pain  Control of acute pain   Outcome: Ongoing    Goal: Control of chronic pain  Control of chronic pain   Outcome: Ongoing      Problem: Falls - Risk of:  Goal: Will remain free from falls  Will remain free from falls   Outcome: Ongoing    Goal: Absence of physical injury  Absence of physical injury   Outcome: Ongoing      Problem: Risk for Impaired Skin Integrity  Goal: Tissue integrity - skin and mucous membranes  Structural intactness and normal physiological function of skin and  mucous membranes.    Outcome: Ongoing

## 2018-12-15 NOTE — H&P
injection, Inject 1 mL into the skin once for 1 dose  OXYGEN, 4 L/min by Nasal route nightly And PRN during the day     Allergies:    Patient has no known allergies. Social History:    reports that she quit smoking about 3 months ago. Her smoking use included Cigarettes. She has a 23.00 pack-year smoking history. She has never used smokeless tobacco. She reports that she does not drink alcohol or use drugs. Family History:   family history includes Breast Cancer in her mother; Clotting Disorder in her father; Heart Attack in her sister; Heart Disease in her sister; Namon Kalyn in her brother. Immunization History:      REVIEW OF SYSTEMS:  HEENT:  Denies blurred vision, double vision, otalgia, recurrent ear infection, dysphagia, hoarseness, epistaxis. RESPIRATORY:  Admits to COPD and asthma. She does admit to a slight cough. She denies any hemoptysis. No purulent sputum production. CARDIOVASCULAR:  She denies chest pain, angina pectoris, intermittent claudication, orthopnea, paroxysmal nocturnal dyspnea. She does have a history of peripheral vascular occlusive disease. GASTROINTESTINAL:  See the chief complaint and history of present illness. GENITOURINARY:  Denies dysuria, frequency, urgency, urolithiasis, pyelonephritis. ENDOCRINE:  She does have a history of type II diabetes mellitus. No history of thyroid disease or adrenal disease. NEUROLOGICAL:  Denies TIA, CVA, paralysis, unilateral muscle weakness, fits or seizures. She does admit to some generalized weakness otherwise. PHYSICAL EXAM:    Vitals:  BP (!) 108/54   Pulse 111   Temp 98.2 °F (36.8 °C) (Oral)   Resp 18   Ht 5' 6\" (1.676 m)   Wt 109 lb 5 oz (49.6 kg)   SpO2 97%   BMI 17.64 kg/m²     General:  Awake, alert, oriented X 3. Cachectic 7year-old white female in no acute distress. HEENT:  Normocephalic, atraumatic. Pupils equal, round, reactive to light. No scleral icterus. No conjunctival injection.     Neck:  Supple without

## 2018-12-16 NOTE — PROGRESS NOTES
Meds:   azithromycin  500 mg Intravenous Once    azithromycin  500 mg Intravenous Q24H    ipratropium-albuterol  1 ampule Inhalation Q4H WA    pantoprazole  40 mg Intravenous Daily    sertraline  100 mg Oral Nightly    atorvastatin  10 mg Oral Nightly    rOPINIRole  1 mg Oral Nightly    QUEtiapine  200 mg Oral Nightly    bumetanide  2 mg Oral Every Other Day    ferrous sulfate  325 mg Oral Daily with breakfast    folic acid  1 mg Oral Daily    potassium chloride  20 mEq Oral Daily    [START ON 12/21/2018] vitamin D  50,000 Units Oral Weekly    gabapentin  300 mg Oral BID    [START ON 12/17/2018] metFORMIN  1,000 mg Oral BID WC    enoxaparin  1 mg/kg Subcutaneous BID    insulin lispro  0-6 Units Subcutaneous TID WC    insulin lispro  0-3 Units Subcutaneous Nightly    ampicillin-sulbactam  3 g Intravenous Q6H     Continuous Infusions:   sodium chloride 75 mL/hr at 12/16/18 1236    dextrose         Objective Data:  CBC:   Recent Labs      12/14/18   2237  12/15/18   0835  12/16/18   0650   WBC  41.7*  29.4*  9.8   HGB  10.1*  8.4*  8.0*   PLT  369  301  245     BMP:  Recent Labs      12/14/18   2237  12/15/18   0835  12/16/18   0650   NA  130*  132  140   K  6.1*  4.7  3.2*   CL  99  101  109*   CO2  18*  22  20*   BUN  20  12  7*   CREATININE  0.8  0.5  0.5   GLUCOSE  146*  80  80     CMP:  Lab Results   Component Value Date     12/16/2018    K 3.2 12/16/2018    K 3.5 09/09/2018     12/16/2018    CO2 20 12/16/2018    BUN 7 12/16/2018    CREATININE 0.5 12/16/2018    GFRAA >60 12/16/2018    LABGLOM >60 12/16/2018    GLUCOSE 80 12/16/2018    GLUCOSE 169 05/14/2012    PROT 5.7 12/16/2018    LABALBU 2.0 12/16/2018    LABALBU 4.0 05/10/2012    CALCIUM 7.8 12/16/2018    BILITOT <0.2 12/16/2018    ALKPHOS 105 12/16/2018    AST 8 12/16/2018    ALT 7 12/16/2018     Hepatic: Recent Labs      12/14/18   2237  12/15/18   0835  12/16/18   0650   AST  14  8  8   ALT  10  7  7   BILITOT  0.3  <0.2 superior mesenteric artery. Inferior mesenteric artery does not appear patent at its origin, as visualized. Please note that this study was not performed with CTA protocol. 1. Interval postoperative changes since previous exam of 10/21/2018. 2. Long length segmental wall thickening and adjacent luminal narrowing involving segment of small bowel loop within right side of abdomen and pelvis. Mild nonspecific inflammatory changes are seen within the adjacent mesenteric fat. Findings may be secondary to ischemic, infectious, or inflammatory causes. 3. Interval placement of celiac artery stent. Evaluation of the stent is limited, secondary to the high density of the metallic stent, but the stent appears grossly patent, as visualized. There is atherosclerotic disease and areas of luminal narrowing involving the portion of the celiac artery distal to the stent. 4. Bilateral lower lobe lung infiltrates, larger on the right side, with air bronchograms noted within the area of dense right lung consolidation 5. Prominent liver with mild diffuse decreased density without focal hepatic mass. Findings are most likely on the basis of diffuse fatty infiltration of the liver versus diffuse hepatocellular disease. Findings are similar to previous exam 6. Other findings as above    Xr Chest Portable    Result Date: 12/15/2018  Location:[facility] Exam: XR CHEST PORTABLE Indications: Sepsis, congestive heart failure, COPD, former smoker Findings: A portable AP erect inspiratory view of the chest was obtained and compared to the previous exam of 9/13/2018. There is patchy decreased density within both lung bases and within the infrahilar regions bilaterally. No pleural effusion or pneumothorax is seen bilaterally. Bilateral hyperinflation of the lungs is seen, suggesting COPD. Cardiac silhouette is not enlarged. Osseous structures are not significantly changed, as visualized. 1. Bilateral hyperinflation of lungs, suggesting COPD.  2.

## 2018-12-17 PROBLEM — K55.9 ISCHEMIC COLITIS (HCC): Status: RESOLVED | Noted: 2018-01-01 | Resolved: 2018-01-01

## 2018-12-17 PROBLEM — A41.9 SEPSIS (HCC): Status: RESOLVED | Noted: 2018-01-01 | Resolved: 2018-01-01

## 2018-12-17 PROBLEM — N39.0 ACUTE UTI: Status: RESOLVED | Noted: 2018-01-01 | Resolved: 2018-01-01

## 2018-12-17 PROBLEM — J18.9 PNEUMONIA OF BOTH LOWER LOBES: Status: RESOLVED | Noted: 2018-01-01 | Resolved: 2018-01-01

## 2018-12-17 PROBLEM — R42 ORTHOSTATIC DIZZINESS: Status: RESOLVED | Noted: 2017-09-26 | Resolved: 2018-01-01

## 2018-12-17 NOTE — PROGRESS NOTES
Physical Therapy  Physical Therapy  Daily Treatment Note  12/17/2018  [id number]                      Asad Mcdonough   [id number]                              1950    Patient Active Problem List   Diagnosis    Chronic diastolic heart failure    Hyperlipidemia    GERD (gastroesophageal reflux disease)    Depression    [facility]    Diabetes mellitus type 2, noninsulin dependent (HCC)    Asthma    COPD [facility]ronic obstructive [facility]) [facility]    PVD (peripheral vascular disease) ([facility]    Elevated lactic acid level    Microcytic anemia    Tobacco abuse    Orthostatic dizziness    Osteopenia of hip    Other specified disorders of bone density and structure, unspecified thigh    Age-related osteoporosis without current pathological fracture    Ischemic colitis ([facility]    Acute intestinal ischemia ([facility]    Sepsis secondary to HCAP ([facility]    Acute UTI    Pneumonia of both lower lobes    Severe protein-calorie malnutrition ([facility]       Recommendation for discharge: NICK vs HHPT  Equipment prescriptions needed:none    AM-[facility] Mobility Inpatient   How much difficulty turning over in bed?: A Little  How much difficulty sitting down on / standing up from a chair with arms?: A Lot  How much difficulty moving from lying on back to sitting on side of bed?: A Lot  How much help from another person moving to and from a bed to a chair?: A Lot  How much help from another person needed to walk in hospital room?: A Lot  How much help from another person for climbing 3-5 steps with a railing?: A Lot  AM-PAC Inpatient Mobility Raw Score : 13  AM-PAC Inpatient T-Scale Score : 36.74  Mobility Inpatient CMS 0-100% Score: 64.91  Mobility Inpatient CMS G-Code Modifier : CL      Precautions: falls, skin    S: Patient cleared by nursing for treatment. Patient is agreeable to treatment. Pain status: (measured on a visual analog scale with 0=no pain and 10=excruciating pain) 0/10.    O: Pt was instructed in

## 2018-12-17 NOTE — PROGRESS NOTES
Severe protein-calorie malnutrition ([facility]         Plan:  Continue present tx. See orders. Continue to follow with hospitalist.  Discharge planning via . Patient currently refusing ECF placement upon discharge. States  and son will provide care at home.     Aliya Hankins  6:48 AM  12/17/2018

## 2018-12-17 NOTE — PROGRESS NOTES
CAT scan showing segmental narrowing in the transverse colon at the region of the previous surgery what appeared possible ischemic changes with interval increased thrombus versus plaque in the proximal SMA with no high-grade occlusion. 2.   sepsis secondary to pneumonia  3.  Non-insulin-dependent diabetes mellitus. 4.  Peripheral arterial disease. 5.  Complicated diastolic congestive heart failure. 6.  History of pulmonary/interstitial lung disease with chronic respiratory failure and hypoxemia, on oxygen therapy and that is a normochromic normocytic anemia. 7.  Depression. 8.  Restless legs syndrome. 9. Severe protein calorie malnutrition  10. COPD with chronic oxygen dependency and history of pulmonary Fibrosis. 11. Hypokalemia with hypomagnesemia      Replace potassium and continue to monitor labs. Recheck Mag and K at 2pm  Infectious workup remains unremarkable thus far. Encourage activity and diet.   Remove jarrell catheter  Wean oxygen and keep saturation greater than 90%  Unasyn day 3, azithromycin day 2  - stop  D/c tomorrow  See orders  Will discuss with attending    Allison Montaño  8:09 AM

## 2018-12-17 NOTE — PROGRESS NOTES
Will order shower chair, nutritional supplements and hospital bed for home.      Pt has poor gait  Severe malnutrition  Limited mobility    Electronically signed by Allison Montaño DO on 12/17/2018 at 2:34 PM

## 2018-12-18 NOTE — PROGRESS NOTES
--   109*   CO2  20*  23   --   21*   BUN  7*  5*   --   3*   CREATININE  0.5  0.5   --   0.5   GLUCOSE  80  71*   --   80         Assessment/Plan:   1.  Abdominal pain.  Etiology will be determined with abnormal CAT scan showing segmental narrowing in the transverse colon at the region of the previous surgery what appeared possible ischemic changes with interval increased thrombus versus plaque in the proximal SMA with no high-grade occlusion. 2.   sepsis secondary to Klebsiella pneumonia  3.  Non-insulin-dependent diabetes mellitus. 4.  Peripheral arterial disease. 5.  Complicated diastolic congestive heart failure. 6.  History of pulmonary/interstitial lung disease with chronic respiratory failure and hypoxemia, on oxygen therapy and that is a normochromic normocytic anemia. 7.  Depression. 8.  Restless legs syndrome. 9. Severe protein calorie malnutrition  10. COPD with chronic oxygen dependency and history of pulmonary Fibrosis. 11. Hypokalemia with hypomagnesemia  12.  Poor gait with need for home shower chair, hospital bed        Electrolytes improved  All IV switched to PO  Levaquin day 2  1g Mag  Recheck hemoglobin this afternoon  Plan for discharge later today  See orders  Will discuss with attending    Vane Nelson  7:25 AM

## 2018-12-18 NOTE — DISCHARGE SUMMARY
Name:  Radha Witt  :  1950  MRN:  [id number]  Room:  0541/4696-06  DOS:  2018    [address] [facility]  Internal Medicine  -Resident Discharge Summary-    PCP:  Juan Valle DO  Admitting Physician:  Erik Scales DO  Consultant(s):   IP CONSULT TO INTERNAL MEDICINE  IP CONSULT TO SOCIAL WORK  IP CONSULT TO DIETITIAN  IP CONSULT TO SOCIAL WORK  IP CONSULT TO SOCIAL WORK      Admission Date:  2018   Discharge Date:  2018      Admission Diagnoses     Sepsis ([address] [A41.9]     Discharge Diagnoses  1.  Abdominal pain.  Etiology will be determined with abnormal CAT scan showing segmental narrowing in the transverse colon at the region of the previous surgery what appeared possible ischemic changes with interval increased thrombus versus plaque in the proximal SMA with no high-grade occlusion. 2.   sepsis secondary to Klebsiella pneumonia  3.  Non-insulin-dependent diabetes mellitus. 4.  Peripheral arterial disease. 5.  Complicated diastolic congestive heart failure. 6.  History of pulmonary/interstitial lung disease with chronic respiratory failure and hypoxemia, on oxygen therapy and that is a normochromic normocytic anemia. 7.  Depression. 8.  Restless legs syndrome. 9. Severe protein calorie malnutrition  10. COPD with chronic oxygen dependency and history of pulmonary Fibrosis. 11. Hypokalemia with hypomagnesemia  12. Poor gait with need for home shower chair, hospital bed      Brief History of Present Illness and [address] is a 77 y.o. female patient of Juan Valle DO who  has a past medical history of Acute UTI; Arthritis; Asthma; Blood transfusion; CHF (congestive heart failure) ([address] COPD (chronic obstructive pulmonary disease) ([address] Diabetes mellitus ([address] GERD (gastroesophageal reflux disease); History of echocardiogram; Hyperlipidemia;  Normal nuclear stress test; Pneumonia; Pneumonia of both lower lobes; Psychiatric problem; PVD declined SNF placement and MVI was setup for home. She had no acute vents during her admission. Her electrolytes were replaced. She was transitioned from IV to oral antibiotics. She tolerated her diet. Her foely catheter was removed and she had no difficulty urinating. Her  and daughter were at the bedside and all questions were answered. PT, OT, Social Work assisted with admission and discharge. She will be d/c'ed to home with [facility]. She is agreeable to this plan.        Brief Physical Examination and Laboratory Data on Day of Discharge   Vitals:  /60   Pulse 102   Temp 98.7 °F (37.1 °C) (Oral)   Resp 18   Ht 5' 6\" (1.676 m)   Wt 109 lb 5 oz (49.6 kg)   SpO2 92%   BMI 17.64 kg/m²   General Appearance:  awake, alert, and oriented to person, place, time, and purpose; appears stated age and cooperative; no apparent distress no labored breathing, frail  HEENT:  NCAT; PERRL; conjunctiva pink, sclera clear  Neck:  no adenopathy, bruit, JVD, tenderness, masses, or nodules; supple, symmetrical, trachea midline, thyroid not enlarged  Lung:  clear to auscultation bilaterally; no use of accessory muscles; no rhonchi, rales, or crackles  Heart:  regular rate and regular rhythm , + systolic murmur  Abdomen:  soft, nontender, nondistended; normoactive bowel sounds; no organomegaly  Extremities:  extremities normal, atraumatic, no cyanosis or edema  Musculokeletal:  Weakness, poor gait, unstable  Neurologic:  mental status A&Ox3, thought content appropriate; CN II-XII grossly intact; sensation intact, motor strength 5/5 globally; no slurred speech    Labs  Lab Results   Component Value Date    WBC 6.4 12/18/2018    HGB 7.6 (L) 12/18/2018    HCT 24.4 (L) 12/18/2018     12/18/2018     12/18/2018    K 4.0 12/18/2018     (H) 12/18/2018    CREATININE 0.5 12/18/2018    BUN 3 (L) 12/18/2018    CO2 21 (L) 12/18/2018    GLUCOSE 80 12/18/2018    ALT 7 12/18/2018    AST 11 12/18/2018    INR 1.0 05/09/2012 Lab Results   Component Value Date    INR 1.0 05/09/2012    INR 1.3 02/09/2012    INR 1.1 07/11/2011    PROTIME 12.1 05/09/2012    PROTIME 14.1 (H) 02/09/2012    PROTIME 13.0 (H) 07/11/2011      Lab Results   Component Value Date    TSH 1.240 12/15/2018     Lab Results   Component Value Date    TRIG 136 12/15/2018    TRIG 209 (H) 08/14/2018    TRIG 326 (H) 10/02/2017     Lab Results   Component Value Date    HDL 39 12/15/2018    HDL 46 08/14/2018    HDL 35 10/02/2017     Lab Results   Component Value Date    LDLCALC 26 12/15/2018    LDLCALC 97 08/14/2018    LDLCALC 117 (H) 10/02/2017     Lab Results   Component Value Date    LABA1C 5.4 12/15/2018       Pertinent Imaging  Ct Abdomen Pelvis W Iv Contrast Additional Contrast? Oral    Result Date: 12/15/2018  Location:100 Exam: CT ABDOMEN PELVIS W IV CONTRAST Indications: Left lower quadrant pain, vomiting, diarrhea. Recent colon resection. Status post appendectomy, status post cholecystectomy, status post hysterectomy  Technique: Automated dose exposure control was used for this exam. Multiple serial axial images through the abdomen and pelvis were obtained, following intravenous and oral contrast administration. 80 mL of Isovue-370 was administered intravenously for this exam. Patient ingested 50 mL of Omnipaque 240 orally for the exam Computer reconstructed images were obtained in the sagittal and coronal projections. Findings: Comparison: Previous study dated 10/21/2018 was reviewed. Images through the lung bases demonstrate large patchy area of dense lung consolidation within the right lower lobe of the lung, containing air bronchograms. Smaller patchy areas of lung consolidation are also noted within the left lower lobe of the lung. No focal hepatic masses seen. The liver is somewhat prominent, similar to previous exam. There is mild decreased density of the liver again noted diffusely. The spleen, right adrenal gland, and pancreas are unremarkable.  Findings capsule  Commonly known as:  ERGOCALCIFEROL  Take 1 capsule by mouth once a week        STOP taking these medications    denosumab 60 MG/ML Soln SC injection  Commonly known as:  PROLIA     methylPREDNISolone sodium 40 MG injection  Commonly known as:  SOLU-MEDROL     pantoprazole 40 MG tablet  Commonly known as:  PROTONIX        ASK your doctor about these medications    enoxaparin 40 MG/0.4ML injection  Commonly known as:  LOVENOX  Inject 0.4 mLs into the skin daily           Where to Get Your Medications      These medications were sent to [address] [place], [place] - [address] - P [phone] - F [phone]  [address] . [facility] [address] [address]    Phone:  [phone]   · famotidine 20 MG tablet  · levofloxacin 500 MG tablet         Follow-up / Instructions  · This patient is instructed to follow-up with her primary care physician. · Patient is instructed to follow-up with the consults listed above as directed by them. · she is instructed to resume home medications and take new medications as indicated in the list above. · If the patient has a recurrence of symptoms, she is instructed to go to the ED. Preparing for this patient's discharge, including paperwork, orders, instructions, and meeting with patient required > 30 minutes.     Bella Salas DO 11:45 AM 12/18/2018

## 2018-12-18 NOTE — CARE COORDINATION
Discharge Planning:  arranged for 4:45PM [facility] to transport pt home today. Pt's  in room and aware of plan. Stated hospital bed has already been delivered to home today. LANDY spoke to [employer] and faxed shower chair order, Shreyas pre-authorizing and stated should be delivered Wednesday or Thursday, pt and  are aware. Shreyas stated nutritional supplement should be ordered by PCP as order needs renewed on a regular basis, pt/ aware and to follow up with PCP. LANDY notified Dustin Levin, [address] [place] [address], of pt dc.   Electronically signed by HIRAL Yanez on 12/18/2018 at 4:16 PM

## 2019-01-01 ENCOUNTER — OFFICE VISIT (OUTPATIENT)
Dept: INTERNAL MEDICINE CLINIC | Age: 69
End: 2019-01-01
Payer: COMMERCIAL

## 2019-01-01 ENCOUNTER — APPOINTMENT (OUTPATIENT)
Dept: GENERAL RADIOLOGY | Age: 69
DRG: 871 | End: 2019-01-01
Payer: COMMERCIAL

## 2019-01-01 ENCOUNTER — HOSPITAL ENCOUNTER (OUTPATIENT)
Age: 69
Setting detail: OBSERVATION
End: 2019-02-09
Attending: EMERGENCY MEDICINE | Admitting: FAMILY MEDICINE
Payer: COMMERCIAL

## 2019-01-01 ENCOUNTER — APPOINTMENT (OUTPATIENT)
Dept: CT IMAGING | Age: 69
End: 2019-01-01
Payer: COMMERCIAL

## 2019-01-01 ENCOUNTER — TELEPHONE (OUTPATIENT)
Dept: INTERNAL MEDICINE CLINIC | Age: 69
End: 2019-01-01

## 2019-01-01 ENCOUNTER — HOSPITAL ENCOUNTER (OUTPATIENT)
Age: 69
Discharge: HOME | End: 2019-01-28
Payer: COMMERCIAL

## 2019-01-01 ENCOUNTER — HOSPITAL ENCOUNTER (INPATIENT)
Age: 69
LOS: 1 days | Discharge: ANOTHER HOSPITAL | DRG: 871 | End: 2019-01-31
Attending: EMERGENCY MEDICINE | Admitting: FAMILY MEDICINE
Payer: COMMERCIAL

## 2019-01-01 ENCOUNTER — TELEPHONE (OUTPATIENT)
Dept: ADMINISTRATIVE | Age: 69
End: 2019-01-01

## 2019-01-01 ENCOUNTER — APPOINTMENT (OUTPATIENT)
Dept: CT IMAGING | Age: 69
DRG: 871 | End: 2019-01-01
Payer: COMMERCIAL

## 2019-01-01 ENCOUNTER — TELEPHONE (OUTPATIENT)
Dept: FAMILY MEDICINE CLINIC | Age: 69
End: 2019-01-01

## 2019-01-01 ENCOUNTER — APPOINTMENT (OUTPATIENT)
Dept: GENERAL RADIOLOGY | Age: 69
End: 2019-01-01
Payer: COMMERCIAL

## 2019-01-01 VITALS
RESPIRATION RATE: 13 BRPM | TEMPERATURE: 98.8 F | HEIGHT: 66 IN | OXYGEN SATURATION: 94 % | BODY MASS INDEX: 20.09 KG/M2 | DIASTOLIC BLOOD PRESSURE: 62 MMHG | WEIGHT: 125 LBS | HEART RATE: 121 BPM | SYSTOLIC BLOOD PRESSURE: 115 MMHG

## 2019-01-01 VITALS
HEART RATE: 116 BPM | HEIGHT: 66 IN | BODY MASS INDEX: 17.29 KG/M2 | WEIGHT: 107.6 LBS | OXYGEN SATURATION: 100 % | SYSTOLIC BLOOD PRESSURE: 124 MMHG | DIASTOLIC BLOOD PRESSURE: 74 MMHG

## 2019-01-01 VITALS
RESPIRATION RATE: 14 BRPM | TEMPERATURE: 97.2 F | HEART RATE: 70 BPM | HEIGHT: 66 IN | DIASTOLIC BLOOD PRESSURE: 48 MMHG | SYSTOLIC BLOOD PRESSURE: 80 MMHG | OXYGEN SATURATION: 81 % | BODY MASS INDEX: 20.09 KG/M2 | WEIGHT: 125 LBS

## 2019-01-01 DIAGNOSIS — Z90.49 S/P RIGHT COLECTOMY: ICD-10-CM

## 2019-01-01 DIAGNOSIS — Z98.62 HX OF ANGIOPLASTY: ICD-10-CM

## 2019-01-01 DIAGNOSIS — D64.9 ANEMIA, UNSPECIFIED TYPE: ICD-10-CM

## 2019-01-01 DIAGNOSIS — R63.6 UNDERWEIGHT DUE TO INADEQUATE CALORIC INTAKE: ICD-10-CM

## 2019-01-01 DIAGNOSIS — A41.9 SEPTIC SHOCK (HCC): ICD-10-CM

## 2019-01-01 DIAGNOSIS — I50.20 SYSTOLIC HEART FAILURE, UNSPECIFIED HF CHRONICITY (HCC): ICD-10-CM

## 2019-01-01 DIAGNOSIS — D50.9 IRON DEFICIENCY ANEMIA, UNSPECIFIED IRON DEFICIENCY ANEMIA TYPE: ICD-10-CM

## 2019-01-01 DIAGNOSIS — E83.51 HYPOCALCEMIA: ICD-10-CM

## 2019-01-01 DIAGNOSIS — E83.51 HYPOCALCEMIA: Primary | ICD-10-CM

## 2019-01-01 DIAGNOSIS — E11.9 DIABETES MELLITUS TYPE 2, NONINSULIN DEPENDENT (HCC): ICD-10-CM

## 2019-01-01 DIAGNOSIS — I10 ESSENTIAL HYPERTENSION: ICD-10-CM

## 2019-01-01 DIAGNOSIS — G25.81 RESTLESS LEGS SYNDROME: ICD-10-CM

## 2019-01-01 DIAGNOSIS — I50.32 CHRONIC DIASTOLIC (CONGESTIVE) HEART FAILURE (HCC): ICD-10-CM

## 2019-01-01 DIAGNOSIS — K55.1 SUPERIOR MESENTERIC ARTERY STENOSIS (HCC): ICD-10-CM

## 2019-01-01 DIAGNOSIS — J96.92 RESPIRATORY FAILURE WITH HYPERCAPNIA, UNSPECIFIED CHRONICITY (HCC): Primary | ICD-10-CM

## 2019-01-01 DIAGNOSIS — Z74.09 IMPAIRED MOBILITY AND ENDURANCE: ICD-10-CM

## 2019-01-01 DIAGNOSIS — E11.49 OTHER DIABETIC NEUROLOGICAL COMPLICATION ASSOCIATED WITH TYPE 2 DIABETES MELLITUS (HCC): ICD-10-CM

## 2019-01-01 DIAGNOSIS — E43 SEVERE PROTEIN-CALORIE MALNUTRITION (HCC): Primary | ICD-10-CM

## 2019-01-01 DIAGNOSIS — Z76.0 MEDICATION REFILL: Primary | ICD-10-CM

## 2019-01-01 DIAGNOSIS — M62.81 GENERALIZED MUSCLE WEAKNESS: ICD-10-CM

## 2019-01-01 DIAGNOSIS — K52.9 COLITIS: Primary | ICD-10-CM

## 2019-01-01 DIAGNOSIS — R65.21 SEPTIC SHOCK (HCC): ICD-10-CM

## 2019-01-01 DIAGNOSIS — K25.9 GASTRIC ULCER, UNSPECIFIED CHRONICITY, UNSPECIFIED WHETHER GASTRIC ULCER HEMORRHAGE OR PERFORATION PRESENT: ICD-10-CM

## 2019-01-01 DIAGNOSIS — R19.7 DIARRHEA, UNSPECIFIED TYPE: ICD-10-CM

## 2019-01-01 LAB
ABO/RH: NORMAL
ALBUMIN SERPL-MCNC: 2.1 G/DL (ref 3.5–5.2)
ALBUMIN SERPL-MCNC: 3.3 G/DL (ref 3.5–5.2)
ALBUMIN SERPL-MCNC: 3.3 G/DL (ref 3.5–5.2)
ALP BLD-CCNC: 113 U/L (ref 35–104)
ALP BLD-CCNC: 114 U/L (ref 35–104)
ALP BLD-CCNC: 80 U/L (ref 35–104)
ALT SERPL-CCNC: 10 U/L (ref 0–32)
ALT SERPL-CCNC: 12 U/L (ref 0–32)
ALT SERPL-CCNC: 14 U/L (ref 0–32)
ANION GAP SERPL CALCULATED.3IONS-SCNC: 10 MMOL/L (ref 7–16)
ANION GAP SERPL CALCULATED.3IONS-SCNC: 11 MMOL/L (ref 7–16)
ANION GAP SERPL CALCULATED.3IONS-SCNC: 12 MMOL/L (ref 7–16)
ANION GAP SERPL CALCULATED.3IONS-SCNC: 13 MMOL/L (ref 7–16)
ANION GAP SERPL CALCULATED.3IONS-SCNC: 13 MMOL/L (ref 7–16)
ANION GAP SERPL CALCULATED.3IONS-SCNC: 2 MMOL/L (ref 7–16)
ANION GAP SERPL CALCULATED.3IONS-SCNC: 6 MMOL/L (ref 7–16)
ANION GAP SERPL CALCULATED.3IONS-SCNC: 6 MMOL/L (ref 7–16)
ANTIBODY SCREEN: NORMAL
APTT: 39.1 SEC (ref 24.5–35.1)
APTT: 73 SEC (ref 24.5–35.1)
AST SERPL-CCNC: 15 U/L (ref 0–31)
AST SERPL-CCNC: 17 U/L (ref 0–31)
AST SERPL-CCNC: 19 U/L (ref 0–31)
B.E.: -1.7 MMOL/L (ref -3–3)
B.E.: -6.7 MMOL/L (ref -3–3)
B.E.: 0 MMOL/L (ref -3–3)
B.E.: 2.2 MMOL/L (ref -3–3)
BASOPHILS ABSOLUTE: 0 E9/L (ref 0–0.2)
BASOPHILS ABSOLUTE: 0 E9/L (ref 0–0.2)
BASOPHILS RELATIVE PERCENT: 0.2 % (ref 0–2)
BASOPHILS RELATIVE PERCENT: 0.2 % (ref 0–2)
BILIRUB SERPL-MCNC: 0.3 MG/DL (ref 0–1.2)
BILIRUB SERPL-MCNC: 0.4 MG/DL (ref 0–1.2)
BILIRUB SERPL-MCNC: <0.2 MG/DL (ref 0–1.2)
BILIRUBIN URINE: NEGATIVE
BLOOD BANK DISPENSE STATUS: NORMAL
BLOOD BANK DISPENSE STATUS: NORMAL
BLOOD BANK PRODUCT CODE: NORMAL
BLOOD BANK PRODUCT CODE: NORMAL
BLOOD CULTURE, ROUTINE: NORMAL
BLOOD, URINE: NEGATIVE
BPU ID: NORMAL
BPU ID: NORMAL
BUN BLDV-MCNC: 10 MG/DL (ref 8–23)
BUN BLDV-MCNC: 11 MG/DL (ref 8–23)
BUN BLDV-MCNC: 11 MG/DL (ref 8–23)
BUN BLDV-MCNC: 12 MG/DL (ref 8–23)
BUN BLDV-MCNC: 9 MG/DL (ref 8–23)
BUN BLDV-MCNC: 9 MG/DL (ref 8–23)
BURR CELLS: ABNORMAL
C DIFFICILE TOXIN, EIA: NORMAL
CALCIUM SERPL-MCNC: 7.2 MG/DL (ref 8.6–10.2)
CALCIUM SERPL-MCNC: 7.2 MG/DL (ref 8.6–10.2)
CALCIUM SERPL-MCNC: 7.3 MG/DL (ref 8.6–10.2)
CALCIUM SERPL-MCNC: 7.5 MG/DL (ref 8.6–10.2)
CALCIUM SERPL-MCNC: 7.6 MG/DL (ref 8.6–10.2)
CALCIUM SERPL-MCNC: 8.2 MG/DL (ref 8.6–10.2)
CALCIUM SERPL-MCNC: 8.9 MG/DL (ref 8.6–10.2)
CALCIUM SERPL-MCNC: 9 MG/DL (ref 8.6–10.2)
CHLORIDE BLD-SCNC: 101 MMOL/L (ref 98–107)
CHLORIDE BLD-SCNC: 101 MMOL/L (ref 98–107)
CHLORIDE BLD-SCNC: 102 MMOL/L (ref 98–107)
CHLORIDE BLD-SCNC: 104 MMOL/L (ref 98–107)
CHLORIDE BLD-SCNC: 106 MMOL/L (ref 98–107)
CHLORIDE BLD-SCNC: 107 MMOL/L (ref 98–107)
CHLORIDE BLD-SCNC: 113 MMOL/L (ref 98–107)
CHLORIDE BLD-SCNC: 99 MMOL/L (ref 98–107)
CHOLESTEROL, TOTAL: 71 MG/DL (ref 0–199)
CLARITY: CLEAR
CO2: 20 MMOL/L (ref 22–29)
CO2: 23 MMOL/L (ref 22–29)
CO2: 24 MMOL/L (ref 22–29)
CO2: 26 MMOL/L (ref 22–29)
CO2: 31 MMOL/L (ref 22–29)
COHB: 0.3 % (ref 0–1.5)
COHB: 0.3 % (ref 0–1.5)
COLOR: YELLOW
CREAT SERPL-MCNC: 0.5 MG/DL (ref 0.5–1)
CREAT SERPL-MCNC: 0.6 MG/DL (ref 0.5–1)
CREAT SERPL-MCNC: 0.7 MG/DL (ref 0.5–1)
CRITICAL NOTIFICATION: YES
CRITICAL NOTIFICATION: YES
CRITICAL: ABNORMAL
CRITICAL: ABNORMAL
CULTURE, BLOOD 2: NORMAL
CULTURE, STOOL: NORMAL
DATE ANALYZED: ABNORMAL
DATE ANALYZED: ABNORMAL
DATE OF COLLECTION: ABNORMAL
DATE OF COLLECTION: ABNORMAL
DELIVERY SYSTEMS: ABNORMAL
DELIVERY SYSTEMS: ABNORMAL
DESCRIPTION BLOOD BANK: NORMAL
DESCRIPTION BLOOD BANK: NORMAL
DEVICE: ABNORMAL
DEVICE: ABNORMAL
EKG ATRIAL RATE: 124 BPM
EKG P AXIS: 66 DEGREES
EKG P-R INTERVAL: 138 MS
EKG Q-T INTERVAL: 324 MS
EKG QRS DURATION: 118 MS
EKG QTC CALCULATION (BAZETT): 465 MS
EKG R AXIS: 5 DEGREES
EKG T AXIS: 24 DEGREES
EKG VENTRICULAR RATE: 124 BPM
EOSINOPHILS ABSOLUTE: 0 E9/L (ref 0.05–0.5)
EOSINOPHILS ABSOLUTE: 0 E9/L (ref 0.05–0.5)
EOSINOPHILS RELATIVE PERCENT: 0 % (ref 0–6)
EOSINOPHILS RELATIVE PERCENT: 0 % (ref 0–6)
FERRITIN: 98 NG/ML
FIO2 ARTERIAL: 100
FIO2 ARTERIAL: 100
FOLATE: >20 NG/ML (ref 4.8–24.2)
GFR AFRICAN AMERICAN: >60
GFR NON-AFRICAN AMERICAN: >60 ML/MIN/1.73
GLUCOSE BLD-MCNC: 101 MG/DL (ref 74–99)
GLUCOSE BLD-MCNC: 102 MG/DL (ref 74–99)
GLUCOSE BLD-MCNC: 104 MG/DL (ref 74–99)
GLUCOSE BLD-MCNC: 105 MG/DL (ref 74–99)
GLUCOSE BLD-MCNC: 107 MG/DL (ref 74–99)
GLUCOSE BLD-MCNC: 108 MG/DL (ref 74–99)
GLUCOSE BLD-MCNC: 117 MG/DL (ref 74–99)
GLUCOSE BLD-MCNC: 154 MG/DL (ref 74–99)
GLUCOSE BLD-MCNC: 71 MG/DL (ref 74–99)
GLUCOSE URINE: NEGATIVE MG/DL
HBA1C MFR BLD: 5.1 % (ref 4–5.6)
HCO3 ARTERIAL: 30.1 MMOL/L (ref 22–26)
HCO3 ARTERIAL: 32.3 MMOL/L (ref 22–26)
HCO3: 18.6 MMOL/L (ref 22–26)
HCO3: 21.9 MMOL/L (ref 22–26)
HCT VFR BLD CALC: 21.4 % (ref 34–48)
HCT VFR BLD CALC: 22.8 % (ref 34–48)
HCT VFR BLD CALC: 26.1 % (ref 34–48)
HCT VFR BLD CALC: 31.1 % (ref 34–48)
HCT VFR BLD CALC: 33.1 % (ref 34–48)
HCT VFR BLD CALC: 33.3 % (ref 34–48)
HDLC SERPL-MCNC: 41 MG/DL
HEMOGLOBIN: 10.6 G/DL (ref 11.5–15.5)
HEMOGLOBIN: 10.9 G/DL (ref 11.5–15.5)
HEMOGLOBIN: 6.7 G/DL (ref 11.5–15.5)
HEMOGLOBIN: 7.4 G/DL (ref 11.5–15.5)
HEMOGLOBIN: 8.2 G/DL (ref 11.5–15.5)
HEMOGLOBIN: 9 G/DL (ref 11.5–15.5)
HHB: 3.1 % (ref 0–5)
HHB: 6.9 % (ref 0–5)
HYPOCHROMIA: ABNORMAL
INR BLD: 1.3
IRON SATURATION: 22 % (ref 15–50)
IRON: 58 MCG/DL (ref 37–145)
KETONES, URINE: NEGATIVE MG/DL
LAB: ABNORMAL
LAB: ABNORMAL
LACTIC ACID: 0.8 MMOL/L (ref 0.5–2.2)
LACTIC ACID: 1.1 MMOL/L (ref 0.5–2.2)
LACTIC ACID: 1.1 MMOL/L (ref 0.5–2.2)
LACTIC ACID: 1.6 MMOL/L (ref 0.5–2.2)
LACTIC ACID: 1.6 MMOL/L (ref 0.5–2.2)
LACTIC ACID: 1.8 MMOL/L (ref 0.5–2.2)
LACTIC ACID: 2.5 MMOL/L (ref 0.5–2.2)
LDL CHOLESTEROL CALCULATED: 13 MG/DL (ref 0–99)
LEUKOCYTE ESTERASE, URINE: NEGATIVE
LIPASE: 28 U/L (ref 13–60)
LYMPHOCYTES ABSOLUTE: 1.21 E9/L (ref 1.5–4)
LYMPHOCYTES ABSOLUTE: 1.84 E9/L (ref 1.5–4)
LYMPHOCYTES RELATIVE PERCENT: 5.2 % (ref 20–42)
LYMPHOCYTES RELATIVE PERCENT: 6.1 % (ref 20–42)
Lab: ABNORMAL
Lab: ABNORMAL
MAGNESIUM: 1.4 MG/DL (ref 1.6–2.6)
MAGNESIUM: 1.6 MG/DL (ref 1.6–2.6)
MAGNESIUM: 1.8 MG/DL (ref 1.6–2.6)
MCH RBC QN AUTO: 28.8 PG (ref 26–35)
MCH RBC QN AUTO: 28.8 PG (ref 26–35)
MCH RBC QN AUTO: 29.1 PG (ref 26–35)
MCH RBC QN AUTO: 29.4 PG (ref 26–35)
MCHC RBC AUTO-ENTMCNC: 28.9 % (ref 32–34.5)
MCHC RBC AUTO-ENTMCNC: 31.4 % (ref 32–34.5)
MCHC RBC AUTO-ENTMCNC: 32 % (ref 32–34.5)
MCHC RBC AUTO-ENTMCNC: 32.7 % (ref 32–34.5)
MCV RBC AUTO: 88.8 FL (ref 80–99.9)
MCV RBC AUTO: 91.6 FL (ref 80–99.9)
MCV RBC AUTO: 91.9 FL (ref 80–99.9)
MCV RBC AUTO: 99.4 FL (ref 80–99.9)
METHB: 0.5 % (ref 0–1.5)
METHB: 0.6 % (ref 0–1.5)
MODE: ABNORMAL
MODE: ABNORMAL
MONOCYTES ABSOLUTE: 1.21 E9/L (ref 0.1–0.95)
MONOCYTES ABSOLUTE: 1.22 E9/L (ref 0.1–0.95)
MONOCYTES RELATIVE PERCENT: 4.3 % (ref 2–12)
MONOCYTES RELATIVE PERCENT: 5.2 % (ref 2–12)
NEUTROPHILS ABSOLUTE: 21.78 E9/L (ref 1.8–7.3)
NEUTROPHILS ABSOLUTE: 27.54 E9/L (ref 1.8–7.3)
NEUTROPHILS RELATIVE PERCENT: 89.6 % (ref 43–80)
NEUTROPHILS RELATIVE PERCENT: 89.7 % (ref 43–80)
NITRITE, URINE: NEGATIVE
O2 CONTENT: 10.2 ML/DL
O2 CONTENT: 12 ML/DL
O2 SATURATION: 68.3 % (ref 92–98.5)
O2 SATURATION: 79.4 % (ref 92–98.5)
O2 SATURATION: 93 % (ref 92–98.5)
O2 SATURATION: 96.9 % (ref 92–98.5)
O2HB: 92.3 % (ref 94–97)
O2HB: 96 % (ref 94–97)
OCCULT BLOOD SCREENING: NORMAL
OPERATOR ID: 4001
OPERATOR ID: 797
OPERATOR ID: 797
OPERATOR ID: ABNORMAL
OVALOCYTES: ABNORMAL
PATIENT TEMP: 37 C
PATIENT TEMP: 37 C
PCO2 ARTERIAL: 88.4 MMHG (ref 35–45)
PCO2 ARTERIAL: 91.6 MMHG (ref 35–45)
PCO2: 32.1 MMHG (ref 35–45)
PCO2: 36.2 MMHG (ref 35–45)
PDW BLD-RTO: 15.2 FL (ref 11.5–15)
PDW BLD-RTO: 15.9 FL (ref 11.5–15)
PH BLOOD GAS: 7.12 (ref 7.35–7.45)
PH BLOOD GAS: 7.17 (ref 7.35–7.45)
PH BLOOD GAS: 7.33 (ref 7.35–7.45)
PH BLOOD GAS: 7.45 (ref 7.35–7.45)
PH UA: 5.5 (ref 5–9)
PHOSPHORUS: 3.2 MG/DL (ref 2.5–4.5)
PLATELET # BLD: 261 E9/L (ref 130–450)
PLATELET # BLD: 272 E9/L (ref 130–450)
PLATELET # BLD: 324 E9/L (ref 130–450)
PLATELET # BLD: 333 E9/L (ref 130–450)
PMV BLD AUTO: 10 FL (ref 7–12)
PMV BLD AUTO: 9.9 FL (ref 7–12)
PO2 ARTERIAL: 46.9 MMHG (ref 60–80)
PO2 ARTERIAL: 60.2 MMHG (ref 60–80)
PO2: 72 MMHG (ref 60–100)
PO2: 98.4 MMHG (ref 60–100)
POC ANION GAP: 12 MMOL/L (ref 7–16)
POC BUN: 5 MG/DL (ref 8–23)
POC CHLORIDE: 110 MMOL/L (ref 100–108)
POC CREATININE: 0.4 MG/DL (ref 0.5–1)
POC POTASSIUM: 2.6 MMOL/L (ref 3.5–5)
POC SODIUM: 145 MMOL/L (ref 132–146)
POIKILOCYTES: ABNORMAL
POSITIVE END EXP PRESS: 6 CMH2O
POSITIVE END EXP PRESS: 6 CMH2O
POTASSIUM REFLEX MAGNESIUM: 4.5 MMOL/L (ref 3.5–5)
POTASSIUM SERPL-SCNC: 2.6 MMOL/L (ref 3.5–5)
POTASSIUM SERPL-SCNC: 3.1 MMOL/L (ref 3.5–5)
POTASSIUM SERPL-SCNC: 3.2 MMOL/L (ref 3.5–5)
POTASSIUM SERPL-SCNC: 3.8 MMOL/L (ref 3.5–5)
POTASSIUM SERPL-SCNC: 3.8 MMOL/L (ref 3.5–5)
POTASSIUM SERPL-SCNC: 4.6 MMOL/L (ref 3.5–5)
POTASSIUM SERPL-SCNC: 5.2 MMOL/L (ref 3.5–5)
PRO-BNP: ABNORMAL PG/ML (ref 0–125)
PROTEIN UA: NEGATIVE MG/DL
PROTHROMBIN TIME: 14 SEC (ref 9.3–12.4)
RBC # BLD: 2.85 E12/L (ref 3.5–5.5)
RBC # BLD: 3.13 E12/L (ref 3.5–5.5)
RBC # BLD: 3.6 E12/L (ref 3.5–5.5)
RBC # BLD: 3.75 E12/L (ref 3.5–5.5)
ROTAVIRUS ANTIGEN: NORMAL
SODIUM BLD-SCNC: 135 MMOL/L (ref 132–146)
SODIUM BLD-SCNC: 136 MMOL/L (ref 132–146)
SODIUM BLD-SCNC: 137 MMOL/L (ref 132–146)
SODIUM BLD-SCNC: 137 MMOL/L (ref 132–146)
SODIUM BLD-SCNC: 138 MMOL/L (ref 132–146)
SODIUM BLD-SCNC: 139 MMOL/L (ref 132–146)
SODIUM BLD-SCNC: 139 MMOL/L (ref 132–146)
SODIUM BLD-SCNC: 140 MMOL/L (ref 132–146)
SOURCE, BLOOD GAS: ABNORMAL
SPECIFIC GRAVITY UA: >=1.03 (ref 1–1.03)
THB: 7.4 G/DL (ref 11.5–16.5)
THB: 9.2 G/DL (ref 11.5–16.5)
TIME ANALYZED: 1143
TIME ANALYZED: 1823
TOTAL IRON BINDING CAPACITY: 267 MCG/DL (ref 250–450)
TOTAL PROTEIN: 5 G/DL (ref 6.4–8.3)
TOTAL PROTEIN: 7.3 G/DL (ref 6.4–8.3)
TOTAL PROTEIN: 7.5 G/DL (ref 6.4–8.3)
TRANSFERRIN: 225 MG/DL (ref 200–360)
TRIGL SERPL-MCNC: 86 MG/DL (ref 0–149)
TROPONIN: 0.02 NG/ML (ref 0–0.03)
TSH SERPL DL<=0.05 MIU/L-ACNC: 2.27 UIU/ML (ref 0.27–4.2)
URINE CULTURE, ROUTINE: NORMAL
UROBILINOGEN, URINE: 0.2 E.U./DL
VITAMIN B-12: 344 PG/ML (ref 211–946)
VITAMIN D 25-HYDROXY: 25 NG/ML (ref 30–100)
VLDLC SERPL CALC-MCNC: 17 MG/DL
WBC # BLD: 14.3 E9/L (ref 4.5–11.5)
WBC # BLD: 15.8 E9/L (ref 4.5–11.5)
WBC # BLD: 24.2 E9/L (ref 4.5–11.5)
WBC # BLD: 30.6 E9/L (ref 4.5–11.5)
WHITE BLOOD CELLS (WBC), STOOL: NORMAL

## 2019-01-01 PROCEDURE — 6360000002 HC RX W HCPCS: Performed by: FAMILY MEDICINE

## 2019-01-01 PROCEDURE — 83540 ASSAY OF IRON: CPT

## 2019-01-01 PROCEDURE — 83550 IRON BINDING TEST: CPT

## 2019-01-01 PROCEDURE — 36415 COLL VENOUS BLD VENIPUNCTURE: CPT

## 2019-01-01 PROCEDURE — 2580000003 HC RX 258: Performed by: FAMILY MEDICINE

## 2019-01-01 PROCEDURE — 71275 CT ANGIOGRAPHY CHEST: CPT

## 2019-01-01 PROCEDURE — 94640 AIRWAY INHALATION TREATMENT: CPT

## 2019-01-01 PROCEDURE — 3017F COLORECTAL CA SCREEN DOC REV: CPT | Performed by: FAMILY MEDICINE

## 2019-01-01 PROCEDURE — 82565 ASSAY OF CREATININE: CPT

## 2019-01-01 PROCEDURE — G0328 FECAL BLOOD SCRN IMMUNOASSAY: HCPCS

## 2019-01-01 PROCEDURE — 87040 BLOOD CULTURE FOR BACTERIA: CPT

## 2019-01-01 PROCEDURE — 1123F ACP DISCUSS/DSCN MKR DOCD: CPT | Performed by: FAMILY MEDICINE

## 2019-01-01 PROCEDURE — 1101F PT FALLS ASSESS-DOCD LE1/YR: CPT | Performed by: FAMILY MEDICINE

## 2019-01-01 PROCEDURE — 85027 COMPLETE CBC AUTOMATED: CPT

## 2019-01-01 PROCEDURE — 2500000003 HC RX 250 WO HCPCS: Performed by: HOSPITALIST

## 2019-01-01 PROCEDURE — 6370000000 HC RX 637 (ALT 250 FOR IP): Performed by: FAMILY MEDICINE

## 2019-01-01 PROCEDURE — 82803 BLOOD GASES ANY COMBINATION: CPT

## 2019-01-01 PROCEDURE — 6360000002 HC RX W HCPCS: Performed by: EMERGENCY MEDICINE

## 2019-01-01 PROCEDURE — 4040F PNEUMOC VAC/ADMIN/RCVD: CPT | Performed by: FAMILY MEDICINE

## 2019-01-01 PROCEDURE — 83605 ASSAY OF LACTIC ACID: CPT

## 2019-01-01 PROCEDURE — 87088 URINE BACTERIA CULTURE: CPT

## 2019-01-01 PROCEDURE — 83036 HEMOGLOBIN GLYCOSYLATED A1C: CPT

## 2019-01-01 PROCEDURE — 84484 ASSAY OF TROPONIN QUANT: CPT

## 2019-01-01 PROCEDURE — 80053 COMPREHEN METABOLIC PANEL: CPT

## 2019-01-01 PROCEDURE — 99285 EMERGENCY DEPT VISIT HI MDM: CPT

## 2019-01-01 PROCEDURE — 85730 THROMBOPLASTIN TIME PARTIAL: CPT

## 2019-01-01 PROCEDURE — 2500000003 HC RX 250 WO HCPCS: Performed by: FAMILY MEDICINE

## 2019-01-01 PROCEDURE — 94660 CPAP INITIATION&MGMT: CPT

## 2019-01-01 PROCEDURE — 85018 HEMOGLOBIN: CPT

## 2019-01-01 PROCEDURE — 87324 CLOSTRIDIUM AG IA: CPT

## 2019-01-01 PROCEDURE — 82728 ASSAY OF FERRITIN: CPT

## 2019-01-01 PROCEDURE — 99223 1ST HOSP IP/OBS HIGH 75: CPT | Performed by: SURGERY

## 2019-01-01 PROCEDURE — 74177 CT ABD & PELVIS W/CONTRAST: CPT

## 2019-01-01 PROCEDURE — 36556 INSERT NON-TUNNEL CV CATH: CPT

## 2019-01-01 PROCEDURE — 6360000002 HC RX W HCPCS: Performed by: HOSPITALIST

## 2019-01-01 PROCEDURE — 96375 TX/PRO/DX INJ NEW DRUG ADDON: CPT

## 2019-01-01 PROCEDURE — 86900 BLOOD TYPING SEROLOGIC ABO: CPT

## 2019-01-01 PROCEDURE — G8418 CALC BMI BLW LOW PARAM F/U: HCPCS | Performed by: FAMILY MEDICINE

## 2019-01-01 PROCEDURE — 82306 VITAMIN D 25 HYDROXY: CPT

## 2019-01-01 PROCEDURE — 6370000000 HC RX 637 (ALT 250 FOR IP): Performed by: HOSPITALIST

## 2019-01-01 PROCEDURE — 2000000000 HC ICU R&B

## 2019-01-01 PROCEDURE — 84520 ASSAY OF UREA NITROGEN: CPT

## 2019-01-01 PROCEDURE — 2580000003 HC RX 258: Performed by: HOSPITALIST

## 2019-01-01 PROCEDURE — 86850 RBC ANTIBODY SCREEN: CPT

## 2019-01-01 PROCEDURE — 86901 BLOOD TYPING SEROLOGIC RH(D): CPT

## 2019-01-01 PROCEDURE — 6360000002 HC RX W HCPCS: Performed by: INTERNAL MEDICINE

## 2019-01-01 PROCEDURE — 2580000003 HC RX 258

## 2019-01-01 PROCEDURE — 36430 TRANSFUSION BLD/BLD COMPNT: CPT

## 2019-01-01 PROCEDURE — 80051 ELECTROLYTE PANEL: CPT

## 2019-01-01 PROCEDURE — G8484 FLU IMMUNIZE NO ADMIN: HCPCS | Performed by: FAMILY MEDICINE

## 2019-01-01 PROCEDURE — 82607 VITAMIN B-12: CPT

## 2019-01-01 PROCEDURE — C9113 INJ PANTOPRAZOLE SODIUM, VIA: HCPCS | Performed by: HOSPITALIST

## 2019-01-01 PROCEDURE — 2580000003 HC RX 258: Performed by: INTERNAL MEDICINE

## 2019-01-01 PROCEDURE — 71045 X-RAY EXAM CHEST 1 VIEW: CPT

## 2019-01-01 PROCEDURE — 96374 THER/PROPH/DIAG INJ IV PUSH: CPT

## 2019-01-01 PROCEDURE — 6360000002 HC RX W HCPCS: Performed by: STUDENT IN AN ORGANIZED HEALTH CARE EDUCATION/TRAINING PROGRAM

## 2019-01-01 PROCEDURE — 85025 COMPLETE CBC W/AUTO DIFF WBC: CPT

## 2019-01-01 PROCEDURE — 6360000002 HC RX W HCPCS

## 2019-01-01 PROCEDURE — 74174 CTA ABD&PLVS W/CONTRAST: CPT

## 2019-01-01 PROCEDURE — 6360000004 HC RX CONTRAST MEDICATION: Performed by: RADIOLOGY

## 2019-01-01 PROCEDURE — 2500000003 HC RX 250 WO HCPCS: Performed by: STUDENT IN AN ORGANIZED HEALTH CARE EDUCATION/TRAINING PROGRAM

## 2019-01-01 PROCEDURE — 6370000000 HC RX 637 (ALT 250 FOR IP): Performed by: EMERGENCY MEDICINE

## 2019-01-01 PROCEDURE — 99223 1ST HOSP IP/OBS HIGH 75: CPT | Performed by: FAMILY MEDICINE

## 2019-01-01 PROCEDURE — G8427 DOCREV CUR MEDS BY ELIG CLIN: HCPCS | Performed by: FAMILY MEDICINE

## 2019-01-01 PROCEDURE — 96376 TX/PRO/DX INJ SAME DRUG ADON: CPT

## 2019-01-01 PROCEDURE — 83880 ASSAY OF NATRIURETIC PEPTIDE: CPT

## 2019-01-01 PROCEDURE — 80048 BASIC METABOLIC PNL TOTAL CA: CPT

## 2019-01-01 PROCEDURE — 87045 FECES CULTURE AEROBIC BACT: CPT

## 2019-01-01 PROCEDURE — 82805 BLOOD GASES W/O2 SATURATION: CPT

## 2019-01-01 PROCEDURE — P9016 RBC LEUKOCYTES REDUCED: HCPCS

## 2019-01-01 PROCEDURE — 84443 ASSAY THYROID STIM HORMONE: CPT

## 2019-01-01 PROCEDURE — 74018 RADEX ABDOMEN 1 VIEW: CPT

## 2019-01-01 PROCEDURE — 99214 OFFICE O/P EST MOD 30 MIN: CPT | Performed by: FAMILY MEDICINE

## 2019-01-01 PROCEDURE — 86920 COMPATIBILITY TEST SPIN: CPT

## 2019-01-01 PROCEDURE — 99212 OFFICE O/P EST SF 10 MIN: CPT | Performed by: FAMILY MEDICINE

## 2019-01-01 PROCEDURE — 36592 COLLECT BLOOD FROM PICC: CPT

## 2019-01-01 PROCEDURE — G0378 HOSPITAL OBSERVATION PER HR: HCPCS

## 2019-01-01 PROCEDURE — 83735 ASSAY OF MAGNESIUM: CPT

## 2019-01-01 PROCEDURE — 89055 LEUKOCYTE ASSESSMENT FECAL: CPT

## 2019-01-01 PROCEDURE — 1036F TOBACCO NON-USER: CPT | Performed by: FAMILY MEDICINE

## 2019-01-01 PROCEDURE — 1090F PRES/ABSN URINE INCON ASSESS: CPT | Performed by: FAMILY MEDICINE

## 2019-01-01 PROCEDURE — 82746 ASSAY OF FOLIC ACID SERUM: CPT

## 2019-01-01 PROCEDURE — 85014 HEMATOCRIT: CPT

## 2019-01-01 PROCEDURE — 87425 ROTAVIRUS AG IA: CPT

## 2019-01-01 PROCEDURE — 80061 LIPID PANEL: CPT

## 2019-01-01 PROCEDURE — 84466 ASSAY OF TRANSFERRIN: CPT

## 2019-01-01 PROCEDURE — 84100 ASSAY OF PHOSPHORUS: CPT

## 2019-01-01 PROCEDURE — 83690 ASSAY OF LIPASE: CPT

## 2019-01-01 PROCEDURE — 85610 PROTHROMBIN TIME: CPT

## 2019-01-01 PROCEDURE — 2580000003 HC RX 258: Performed by: EMERGENCY MEDICINE

## 2019-01-01 PROCEDURE — 2580000003 HC RX 258: Performed by: STUDENT IN AN ORGANIZED HEALTH CARE EDUCATION/TRAINING PROGRAM

## 2019-01-01 PROCEDURE — 3044F HG A1C LEVEL LT 7.0%: CPT | Performed by: FAMILY MEDICINE

## 2019-01-01 PROCEDURE — 81003 URINALYSIS AUTO W/O SCOPE: CPT

## 2019-01-01 PROCEDURE — 2022F DILAT RTA XM EVC RTNOPTHY: CPT | Performed by: FAMILY MEDICINE

## 2019-01-01 PROCEDURE — 82947 ASSAY GLUCOSE BLOOD QUANT: CPT

## 2019-01-01 PROCEDURE — G8399 PT W/DXA RESULTS DOCUMENT: HCPCS | Performed by: FAMILY MEDICINE

## 2019-01-01 RX ORDER — FENTANYL CITRATE 50 UG/ML
25 INJECTION, SOLUTION INTRAMUSCULAR; INTRAVENOUS ONCE
Status: COMPLETED | OUTPATIENT
Start: 2019-01-01 | End: 2019-01-01

## 2019-01-01 RX ORDER — MAGNESIUM SULFATE IN WATER 40 MG/ML
2 INJECTION, SOLUTION INTRAVENOUS ONCE
Status: COMPLETED | OUTPATIENT
Start: 2019-01-01 | End: 2019-01-01

## 2019-01-01 RX ORDER — FAMOTIDINE 20 MG/1
20 TABLET, FILM COATED ORAL 2 TIMES DAILY
Qty: 60 TABLET | Refills: 1 | Status: SHIPPED | OUTPATIENT
Start: 2019-01-01 | End: 2019-01-01 | Stop reason: SDUPTHER

## 2019-01-01 RX ORDER — GLYCOPYRROLATE 0.2 MG/ML
0.4 INJECTION INTRAMUSCULAR; INTRAVENOUS EVERY 4 HOURS PRN
Status: DISCONTINUED | OUTPATIENT
Start: 2019-01-01 | End: 2019-01-01 | Stop reason: HOSPADM

## 2019-01-01 RX ORDER — MORPHINE SULFATE 4 MG/ML
2 INJECTION, SOLUTION INTRAMUSCULAR; INTRAVENOUS ONCE
Status: DISCONTINUED | OUTPATIENT
Start: 2019-01-01 | End: 2019-01-01 | Stop reason: HOSPADM

## 2019-01-01 RX ORDER — POTASSIUM CHLORIDE 20 MEQ/1
20 TABLET, EXTENDED RELEASE ORAL DAILY
Qty: 60 TABLET | Refills: 3 | Status: SHIPPED | OUTPATIENT
Start: 2019-01-01

## 2019-01-01 RX ORDER — FERROUS SULFATE 325(65) MG
325 TABLET ORAL
Qty: 30 TABLET | Refills: 3 | Status: SHIPPED | OUTPATIENT
Start: 2019-01-01

## 2019-01-01 RX ORDER — FORMOTEROL FUMARATE 20 UG/2ML
20 SOLUTION RESPIRATORY (INHALATION) 2 TIMES DAILY
Status: DISCONTINUED | OUTPATIENT
Start: 2019-01-01 | End: 2019-01-01 | Stop reason: HOSPADM

## 2019-01-01 RX ORDER — GABAPENTIN 300 MG/1
600 CAPSULE ORAL 2 TIMES DAILY
Qty: 120 CAPSULE | Refills: 2 | OUTPATIENT
Start: 2019-01-01 | End: 2019-04-22

## 2019-01-01 RX ORDER — SODIUM CHLORIDE 9 MG/ML
INJECTION, SOLUTION INTRAVENOUS
Status: DISPENSED
Start: 2019-01-01 | End: 2019-01-01

## 2019-01-01 RX ORDER — BUMETANIDE 2 MG/1
2 TABLET ORAL EVERY OTHER DAY
Qty: 30 TABLET | Refills: 3 | Status: SHIPPED | OUTPATIENT
Start: 2019-01-01

## 2019-01-01 RX ORDER — GABAPENTIN 300 MG/1
600 CAPSULE ORAL 2 TIMES DAILY
Qty: 120 CAPSULE | Refills: 0 | Status: SHIPPED | OUTPATIENT
Start: 2019-01-01 | End: 2019-02-24

## 2019-01-01 RX ORDER — SODIUM CHLORIDE 0.9 % (FLUSH) 0.9 %
SYRINGE (ML) INJECTION
Status: COMPLETED
Start: 2019-01-01 | End: 2019-01-01

## 2019-01-01 RX ORDER — HEPARIN SODIUM 1000 [USP'U]/ML
40 INJECTION, SOLUTION INTRAVENOUS; SUBCUTANEOUS PRN
Status: DISCONTINUED | OUTPATIENT
Start: 2019-01-01 | End: 2019-01-01 | Stop reason: HOSPADM

## 2019-01-01 RX ORDER — HEPARIN SODIUM 1000 [USP'U]/ML
80 INJECTION, SOLUTION INTRAVENOUS; SUBCUTANEOUS ONCE
Status: COMPLETED | OUTPATIENT
Start: 2019-01-01 | End: 2019-01-01

## 2019-01-01 RX ORDER — IPRATROPIUM BROMIDE AND ALBUTEROL SULFATE 2.5; .5 MG/3ML; MG/3ML
3 SOLUTION RESPIRATORY (INHALATION)
Status: DISCONTINUED | OUTPATIENT
Start: 2019-01-01 | End: 2019-01-01 | Stop reason: HOSPADM

## 2019-01-01 RX ORDER — 0.9 % SODIUM CHLORIDE 0.9 %
1000 INTRAVENOUS SOLUTION INTRAVENOUS ONCE
Status: COMPLETED | OUTPATIENT
Start: 2019-01-01 | End: 2019-01-01

## 2019-01-01 RX ORDER — ATORVASTATIN CALCIUM 10 MG/1
10 TABLET, FILM COATED ORAL NIGHTLY
Qty: 30 TABLET | Refills: 3 | Status: SHIPPED | OUTPATIENT
Start: 2019-01-01

## 2019-01-01 RX ORDER — MORPHINE SULFATE 2 MG/ML
2 INJECTION, SOLUTION INTRAMUSCULAR; INTRAVENOUS
Status: DISCONTINUED | OUTPATIENT
Start: 2019-01-01 | End: 2019-01-01 | Stop reason: HOSPADM

## 2019-01-01 RX ORDER — SODIUM CHLORIDE 0.9 % (FLUSH) 0.9 %
10 SYRINGE (ML) INJECTION PRN
Status: DISCONTINUED | OUTPATIENT
Start: 2019-01-01 | End: 2019-01-01 | Stop reason: HOSPADM

## 2019-01-01 RX ORDER — PANTOPRAZOLE SODIUM 40 MG/10ML
40 INJECTION, POWDER, LYOPHILIZED, FOR SOLUTION INTRAVENOUS 2 TIMES DAILY
Status: DISCONTINUED | OUTPATIENT
Start: 2019-01-01 | End: 2019-01-01 | Stop reason: HOSPADM

## 2019-01-01 RX ORDER — SODIUM CHLORIDE 0.9 % (FLUSH) 0.9 %
10 SYRINGE (ML) INJECTION EVERY 12 HOURS SCHEDULED
Status: DISCONTINUED | OUTPATIENT
Start: 2019-01-01 | End: 2019-01-01 | Stop reason: HOSPADM

## 2019-01-01 RX ORDER — POTASSIUM CHLORIDE 20 MEQ/1
40 TABLET, EXTENDED RELEASE ORAL ONCE
Status: DISCONTINUED | OUTPATIENT
Start: 2019-01-01 | End: 2019-01-01

## 2019-01-01 RX ORDER — FAMOTIDINE 20 MG/1
20 TABLET, FILM COATED ORAL 2 TIMES DAILY
Qty: 60 TABLET | Refills: 1 | Status: SHIPPED | OUTPATIENT
Start: 2019-01-01

## 2019-01-01 RX ORDER — ROPINIROLE 1 MG/1
0.5 TABLET, FILM COATED ORAL NIGHTLY
Qty: 7 TABLET | Refills: 0 | Status: SHIPPED | OUTPATIENT
Start: 2019-01-01

## 2019-01-01 RX ORDER — LACTOSE-REDUCED FOOD 0.06G-1/ML
1 LIQUID (ML) ORAL
Qty: 90 CAN | Refills: 2 | Status: SHIPPED | OUTPATIENT
Start: 2019-01-01

## 2019-01-01 RX ORDER — CALCIUM CITRATE 1040MG
1 TABLET ORAL DAILY
Qty: 90 TABLET | Refills: 0 | Status: SHIPPED | OUTPATIENT
Start: 2019-01-01

## 2019-01-01 RX ORDER — ACETAMINOPHEN 325 MG/1
650 TABLET ORAL EVERY 4 HOURS PRN
Status: DISCONTINUED | OUTPATIENT
Start: 2019-01-01 | End: 2019-01-01 | Stop reason: HOSPADM

## 2019-01-01 RX ORDER — 0.9 % SODIUM CHLORIDE 0.9 %
500 INTRAVENOUS SOLUTION INTRAVENOUS ONCE
Status: DISCONTINUED | OUTPATIENT
Start: 2019-01-01 | End: 2019-01-01 | Stop reason: HOSPADM

## 2019-01-01 RX ORDER — FOLIC ACID 1 MG/1
1 TABLET ORAL DAILY
Status: DISCONTINUED | OUTPATIENT
Start: 2019-01-01 | End: 2019-01-01 | Stop reason: HOSPADM

## 2019-01-01 RX ORDER — FENTANYL CITRATE 50 UG/ML
25 INJECTION, SOLUTION INTRAMUSCULAR; INTRAVENOUS
Status: DISCONTINUED | OUTPATIENT
Start: 2019-01-01 | End: 2019-01-01 | Stop reason: HOSPADM

## 2019-01-01 RX ORDER — 0.9 % SODIUM CHLORIDE 0.9 %
250 INTRAVENOUS SOLUTION INTRAVENOUS ONCE
Status: DISCONTINUED | OUTPATIENT
Start: 2019-01-01 | End: 2019-01-01 | Stop reason: HOSPADM

## 2019-01-01 RX ORDER — LACTOSE-REDUCED FOOD 0.06G-1/ML
1 LIQUID (ML) ORAL
Qty: 90 CAN | Refills: 2 | Status: SHIPPED | OUTPATIENT
Start: 2019-01-01 | End: 2019-01-01 | Stop reason: SDUPTHER

## 2019-01-01 RX ORDER — IPRATROPIUM BROMIDE AND ALBUTEROL SULFATE 2.5; .5 MG/3ML; MG/3ML
3 SOLUTION RESPIRATORY (INHALATION) CONTINUOUS
Status: DISCONTINUED | OUTPATIENT
Start: 2019-01-01 | End: 2019-01-01

## 2019-01-01 RX ORDER — LORAZEPAM 2 MG/ML
0.5 INJECTION INTRAMUSCULAR EVERY 4 HOURS PRN
Status: DISCONTINUED | OUTPATIENT
Start: 2019-01-01 | End: 2019-01-01 | Stop reason: HOSPADM

## 2019-01-01 RX ORDER — ROPINIROLE 1 MG/1
0.5 TABLET, FILM COATED ORAL NIGHTLY
Qty: 7 TABLET | Refills: 0 | Status: SHIPPED | OUTPATIENT
Start: 2019-01-01 | End: 2019-01-01 | Stop reason: SDUPTHER

## 2019-01-01 RX ORDER — MORPHINE SULFATE 4 MG/ML
2 INJECTION, SOLUTION INTRAMUSCULAR; INTRAVENOUS ONCE
Status: COMPLETED | OUTPATIENT
Start: 2019-01-01 | End: 2019-01-01

## 2019-01-01 RX ORDER — HEPARIN SODIUM 10000 [USP'U]/100ML
18 INJECTION, SOLUTION INTRAVENOUS CONTINUOUS
Status: DISCONTINUED | OUTPATIENT
Start: 2019-01-01 | End: 2019-01-01 | Stop reason: HOSPADM

## 2019-01-01 RX ORDER — BUDESONIDE 0.5 MG/2ML
0.5 INHALANT ORAL 2 TIMES DAILY
Status: DISCONTINUED | OUTPATIENT
Start: 2019-01-01 | End: 2019-01-01 | Stop reason: HOSPADM

## 2019-01-01 RX ORDER — IPRATROPIUM BROMIDE AND ALBUTEROL SULFATE 2.5; .5 MG/3ML; MG/3ML
1 SOLUTION RESPIRATORY (INHALATION) EVERY 4 HOURS PRN
Status: DISCONTINUED | OUTPATIENT
Start: 2019-01-01 | End: 2019-01-01 | Stop reason: HOSPADM

## 2019-01-01 RX ORDER — IPRATROPIUM BROMIDE AND ALBUTEROL SULFATE 2.5; .5 MG/3ML; MG/3ML
3 SOLUTION RESPIRATORY (INHALATION) EVERY 4 HOURS
Status: DISCONTINUED | OUTPATIENT
Start: 2019-01-01 | End: 2019-01-01

## 2019-01-01 RX ORDER — POTASSIUM CHLORIDE 29.8 MG/ML
20 INJECTION INTRAVENOUS ONCE
Status: DISCONTINUED | OUTPATIENT
Start: 2019-01-01 | End: 2019-01-01

## 2019-01-01 RX ORDER — POTASSIUM CHLORIDE 29.8 MG/ML
40 INJECTION INTRAVENOUS EVERY 4 HOURS
Status: DISCONTINUED | OUTPATIENT
Start: 2019-01-01 | End: 2019-01-01 | Stop reason: CLARIF

## 2019-01-01 RX ORDER — FOLIC ACID 1 MG/1
1 TABLET ORAL DAILY
Qty: 30 TABLET | Refills: 3 | Status: SHIPPED | OUTPATIENT
Start: 2019-01-01

## 2019-01-01 RX ORDER — DEXTROSE AND SODIUM CHLORIDE 5; .9 G/100ML; G/100ML
INJECTION, SOLUTION INTRAVENOUS ONCE
Status: DISCONTINUED | OUTPATIENT
Start: 2019-01-01 | End: 2019-01-01

## 2019-01-01 RX ORDER — HEPARIN SODIUM 1000 [USP'U]/ML
80 INJECTION, SOLUTION INTRAVENOUS; SUBCUTANEOUS PRN
Status: DISCONTINUED | OUTPATIENT
Start: 2019-01-01 | End: 2019-01-01 | Stop reason: HOSPADM

## 2019-01-01 RX ORDER — MORPHINE SULFATE 4 MG/ML
INJECTION, SOLUTION INTRAMUSCULAR; INTRAVENOUS
Status: COMPLETED
Start: 2019-01-01 | End: 2019-01-01

## 2019-01-01 RX ORDER — 0.9 % SODIUM CHLORIDE 0.9 %
10 VIAL (ML) INJECTION 2 TIMES DAILY
Status: DISCONTINUED | OUTPATIENT
Start: 2019-01-01 | End: 2019-01-01 | Stop reason: HOSPADM

## 2019-01-01 RX ORDER — ATORVASTATIN CALCIUM 10 MG/1
10 TABLET, FILM COATED ORAL NIGHTLY
Status: DISCONTINUED | OUTPATIENT
Start: 2019-01-01 | End: 2019-01-01 | Stop reason: HOSPADM

## 2019-01-01 RX ORDER — CHOLESTYRAMINE 4 G/9G
1 POWDER, FOR SUSPENSION ORAL
Qty: 90 PACKET | Refills: 1 | Status: SHIPPED | OUTPATIENT
Start: 2019-01-01

## 2019-01-01 RX ORDER — ONDANSETRON 2 MG/ML
4 INJECTION INTRAMUSCULAR; INTRAVENOUS ONCE
Status: COMPLETED | OUTPATIENT
Start: 2019-01-01 | End: 2019-01-01

## 2019-01-01 RX ADMIN — FENTANYL CITRATE 25 MCG: 50 INJECTION, SOLUTION INTRAMUSCULAR; INTRAVENOUS at 23:34

## 2019-01-01 RX ADMIN — TAZOBACTAM SODIUM AND PIPERACILLIN SODIUM 3.38 G: 375; 3 INJECTION, SOLUTION INTRAVENOUS at 16:27

## 2019-01-01 RX ADMIN — FENTANYL CITRATE 25 MCG: 50 INJECTION, SOLUTION INTRAMUSCULAR; INTRAVENOUS at 20:23

## 2019-01-01 RX ADMIN — IPRATROPIUM BROMIDE AND ALBUTEROL SULFATE 3 ML: .5; 3 SOLUTION RESPIRATORY (INHALATION) at 21:58

## 2019-01-01 RX ADMIN — ONDANSETRON 4 MG: 2 INJECTION INTRAMUSCULAR; INTRAVENOUS at 08:04

## 2019-01-01 RX ADMIN — FENTANYL CITRATE 25 MCG: 50 INJECTION, SOLUTION INTRAMUSCULAR; INTRAVENOUS at 09:46

## 2019-01-01 RX ADMIN — SODIUM CHLORIDE 1000 ML: 9 INJECTION, SOLUTION INTRAVENOUS at 14:11

## 2019-01-01 RX ADMIN — ROFLUMILAST 500 MCG: 500 TABLET ORAL at 18:35

## 2019-01-01 RX ADMIN — FENTANYL CITRATE 25 MCG: 50 INJECTION, SOLUTION INTRAMUSCULAR; INTRAVENOUS at 11:36

## 2019-01-01 RX ADMIN — ATORVASTATIN CALCIUM 10 MG: 10 TABLET, FILM COATED ORAL at 20:14

## 2019-01-01 RX ADMIN — MAGNESIUM SULFATE HEPTAHYDRATE 2 G: 40 INJECTION, SOLUTION INTRAVENOUS at 18:35

## 2019-01-01 RX ADMIN — Medication 10 ML: at 20:14

## 2019-01-01 RX ADMIN — Medication 10 ML: at 20:40

## 2019-01-01 RX ADMIN — IOPAMIDOL 80 ML: 755 INJECTION, SOLUTION INTRAVENOUS at 00:29

## 2019-01-01 RX ADMIN — FOLIC ACID 1 MG: 1 TABLET ORAL at 18:35

## 2019-01-01 RX ADMIN — MORPHINE SULFATE 2 MG: 2 INJECTION, SOLUTION INTRAMUSCULAR; INTRAVENOUS at 10:02

## 2019-01-01 RX ADMIN — PANTOPRAZOLE SODIUM 40 MG: 40 INJECTION, POWDER, FOR SOLUTION INTRAVENOUS at 20:13

## 2019-01-01 RX ADMIN — Medication 250 MG: at 17:17

## 2019-01-01 RX ADMIN — HEPARIN SODIUM AND DEXTROSE 18 UNITS/KG/HR: 10000; 5 INJECTION INTRAVENOUS at 18:55

## 2019-01-01 RX ADMIN — TAZOBACTAM SODIUM AND PIPERACILLIN SODIUM 3.38 G: 375; 3 INJECTION, SOLUTION INTRAVENOUS at 08:11

## 2019-01-01 RX ADMIN — ATORVASTATIN CALCIUM 10 MG: 10 TABLET, FILM COATED ORAL at 20:40

## 2019-01-01 RX ADMIN — ACETAMINOPHEN 650 MG: 325 TABLET, FILM COATED ORAL at 09:27

## 2019-01-01 RX ADMIN — BUDESONIDE 500 MCG: 0.5 INHALANT RESPIRATORY (INHALATION) at 06:19

## 2019-01-01 RX ADMIN — FENTANYL CITRATE 25 MCG: 50 INJECTION, SOLUTION INTRAMUSCULAR; INTRAVENOUS at 17:08

## 2019-01-01 RX ADMIN — FORMOTEROL FUMARATE DIHYDRATE 20 MCG: 20 SOLUTION RESPIRATORY (INHALATION) at 06:18

## 2019-01-01 RX ADMIN — TAZOBACTAM SODIUM AND PIPERACILLIN SODIUM 3.38 G: 375; 3 INJECTION, SOLUTION INTRAVENOUS at 23:43

## 2019-01-01 RX ADMIN — POTASSIUM CHLORIDE 40 MEQ: 149 INJECTION, SOLUTION, CONCENTRATE INTRAVENOUS at 14:11

## 2019-01-01 RX ADMIN — WATER 1 G: 1 INJECTION INTRAMUSCULAR; INTRAVENOUS; SUBCUTANEOUS at 13:10

## 2019-01-01 RX ADMIN — SODIUM BICARBONATE: 84 INJECTION, SOLUTION INTRAVENOUS at 03:38

## 2019-01-01 RX ADMIN — FENTANYL CITRATE 25 MCG: 50 INJECTION, SOLUTION INTRAMUSCULAR; INTRAVENOUS at 15:58

## 2019-01-01 RX ADMIN — POTASSIUM CHLORIDE 40 MEQ: 149 INJECTION, SOLUTION, CONCENTRATE INTRAVENOUS at 16:20

## 2019-01-01 RX ADMIN — FENTANYL CITRATE 25 MCG: 50 INJECTION, SOLUTION INTRAMUSCULAR; INTRAVENOUS at 17:53

## 2019-01-01 RX ADMIN — BUDESONIDE 500 MCG: 0.5 INHALANT RESPIRATORY (INHALATION) at 17:30

## 2019-01-01 RX ADMIN — Medication 250 MG: at 17:54

## 2019-01-01 RX ADMIN — IPRATROPIUM BROMIDE AND ALBUTEROL SULFATE 3 ML: .5; 3 SOLUTION RESPIRATORY (INHALATION) at 13:54

## 2019-01-01 RX ADMIN — Medication 10 ML: at 11:37

## 2019-01-01 RX ADMIN — Medication 250 MG: at 23:43

## 2019-01-01 RX ADMIN — METRONIDAZOLE 500 MG: 500 INJECTION, SOLUTION INTRAVENOUS at 13:10

## 2019-01-01 RX ADMIN — FOLIC ACID 1 MG: 1 TABLET ORAL at 08:11

## 2019-01-01 RX ADMIN — FENTANYL CITRATE 25 MCG: 50 INJECTION, SOLUTION INTRAMUSCULAR; INTRAVENOUS at 09:51

## 2019-01-01 RX ADMIN — MORPHINE SULFATE 2 MG: 4 INJECTION, SOLUTION INTRAMUSCULAR; INTRAVENOUS at 01:23

## 2019-01-01 RX ADMIN — ENOXAPARIN SODIUM 40 MG: 100 INJECTION SUBCUTANEOUS at 14:11

## 2019-01-01 RX ADMIN — FENTANYL CITRATE 25 MCG: 50 INJECTION, SOLUTION INTRAMUSCULAR; INTRAVENOUS at 07:46

## 2019-01-01 RX ADMIN — SODIUM CHLORIDE 250 ML: 9 INJECTION, SOLUTION INTRAVENOUS at 15:06

## 2019-01-01 RX ADMIN — SODIUM BICARBONATE: 84 INJECTION, SOLUTION INTRAVENOUS at 17:46

## 2019-01-01 RX ADMIN — SODIUM CHLORIDE 1000 ML: 9 INJECTION, SOLUTION INTRAVENOUS at 11:36

## 2019-01-01 RX ADMIN — FENTANYL CITRATE 25 MCG: 50 INJECTION, SOLUTION INTRAMUSCULAR; INTRAVENOUS at 14:11

## 2019-01-01 RX ADMIN — SODIUM CHLORIDE 1000 ML: 9 INJECTION, SOLUTION INTRAVENOUS at 08:04

## 2019-01-01 RX ADMIN — GLYCOPYRROLATE 0.4 MG: 0.2 INJECTION, SOLUTION INTRAMUSCULAR; INTRAVENOUS at 07:58

## 2019-01-01 RX ADMIN — PANTOPRAZOLE SODIUM 40 MG: 40 INJECTION, POWDER, FOR SOLUTION INTRAVENOUS at 20:40

## 2019-01-01 RX ADMIN — PANTOPRAZOLE SODIUM 40 MG: 40 INJECTION, POWDER, FOR SOLUTION INTRAVENOUS at 08:11

## 2019-01-01 RX ADMIN — FORMOTEROL FUMARATE DIHYDRATE 20 MCG: 20 SOLUTION RESPIRATORY (INHALATION) at 17:29

## 2019-01-01 RX ADMIN — FENTANYL CITRATE 25 MCG: 50 INJECTION, SOLUTION INTRAMUSCULAR; INTRAVENOUS at 03:35

## 2019-01-01 RX ADMIN — FENTANYL CITRATE 25 MCG: 50 INJECTION, SOLUTION INTRAMUSCULAR; INTRAVENOUS at 13:40

## 2019-01-01 RX ADMIN — MORPHINE SULFATE 2 MG: 4 INJECTION, SOLUTION INTRAMUSCULAR; INTRAVENOUS at 01:11

## 2019-01-01 RX ADMIN — Medication 10 ML: at 08:12

## 2019-01-01 RX ADMIN — SODIUM BICARBONATE: 84 INJECTION, SOLUTION INTRAVENOUS at 13:40

## 2019-01-01 RX ADMIN — IPRATROPIUM BROMIDE AND ALBUTEROL SULFATE 3 ML: .5; 3 SOLUTION RESPIRATORY (INHALATION) at 09:59

## 2019-01-01 RX ADMIN — ROFLUMILAST 500 MCG: 500 TABLET ORAL at 08:11

## 2019-01-01 RX ADMIN — TAZOBACTAM SODIUM AND PIPERACILLIN SODIUM 3.38 G: 375; 3 INJECTION, SOLUTION INTRAVENOUS at 17:54

## 2019-01-01 RX ADMIN — HEPARIN SODIUM AND DEXTROSE 18 UNITS/KG/HR: 10000; 5 INJECTION INTRAVENOUS at 20:39

## 2019-01-01 RX ADMIN — Medication 250 MG: at 11:37

## 2019-01-01 RX ADMIN — Medication 250 MG: at 06:25

## 2019-01-01 RX ADMIN — FENTANYL CITRATE 25 MCG: 50 INJECTION, SOLUTION INTRAMUSCULAR; INTRAVENOUS at 08:05

## 2019-01-01 RX ADMIN — FENTANYL CITRATE 25 MCG: 50 INJECTION, SOLUTION INTRAMUSCULAR; INTRAVENOUS at 20:26

## 2019-01-01 RX ADMIN — IPRATROPIUM BROMIDE AND ALBUTEROL SULFATE 3 AMPULE: .5; 3 SOLUTION RESPIRATORY (INHALATION) at 23:14

## 2019-01-01 RX ADMIN — Medication 10 ML: at 08:11

## 2019-01-01 RX ADMIN — FENTANYL CITRATE 25 MCG: 50 INJECTION, SOLUTION INTRAMUSCULAR; INTRAVENOUS at 11:37

## 2019-01-01 RX ADMIN — Medication 10 ML: at 08:13

## 2019-01-01 RX ADMIN — HEPARIN SODIUM 3880 UNITS: 1000 INJECTION, SOLUTION INTRAVENOUS; SUBCUTANEOUS at 18:52

## 2019-01-01 RX ADMIN — IOPAMIDOL 80 ML: 755 INJECTION, SOLUTION INTRAVENOUS at 11:26

## 2019-01-01 RX ADMIN — MORPHINE SULFATE 2 MG: 2 INJECTION, SOLUTION INTRAMUSCULAR; INTRAVENOUS at 08:02

## 2019-01-01 ASSESSMENT — PAIN DESCRIPTION - PROGRESSION
CLINICAL_PROGRESSION: NOT CHANGED
CLINICAL_PROGRESSION: GRADUALLY WORSENING
CLINICAL_PROGRESSION: NOT CHANGED

## 2019-01-01 ASSESSMENT — PAIN DESCRIPTION - ORIENTATION
ORIENTATION: RIGHT;LEFT;LOWER;MID;UPPER
ORIENTATION: LOWER;LEFT;RIGHT
ORIENTATION: MID;LOWER;LEFT;RIGHT
ORIENTATION: MID

## 2019-01-01 ASSESSMENT — ENCOUNTER SYMPTOMS
WHEEZING: 0
VOMITING: 0
EYE DISCHARGE: 0
DIARRHEA: 1
NAUSEA: 0
PHOTOPHOBIA: 0
ABDOMINAL PAIN: 1
EYE PAIN: 0
VOMITING: 0
SHORTNESS OF BREATH: 0
ABDOMINAL DISTENTION: 0
CHOKING: 0
RHINORRHEA: 0
ABDOMINAL PAIN: 0
EYE PAIN: 0
EYE PAIN: 0
TROUBLE SWALLOWING: 0
PHOTOPHOBIA: 0
BLOOD IN STOOL: 0
VOMITING: 1
COUGH: 0
SORE THROAT: 0
ABDOMINAL DISTENTION: 0
RHINORRHEA: 0
TROUBLE SWALLOWING: 0
SHORTNESS OF BREATH: 1
BACK PAIN: 0
PHOTOPHOBIA: 0
SHORTNESS OF BREATH: 0
CHOKING: 0
NAUSEA: 1
BLOOD IN STOOL: 0
CONSTIPATION: 0
CHEST TIGHTNESS: 0
SORE THROAT: 0
BACK PAIN: 0
BACK PAIN: 0
EYE DISCHARGE: 0
TROUBLE SWALLOWING: 0
COUGH: 0
DIARRHEA: 0
WHEEZING: 0
CHEST TIGHTNESS: 0
SHORTNESS OF BREATH: 0
CHOKING: 0
ABDOMINAL DISTENTION: 0
EYE DISCHARGE: 0
CONSTIPATION: 0
RHINORRHEA: 0
CHEST TIGHTNESS: 0
BLOOD IN STOOL: 1
CONSTIPATION: 0
NAUSEA: 0
WHEEZING: 0
COUGH: 0
EYE REDNESS: 0
ABDOMINAL DISTENTION: 1
ABDOMINAL PAIN: 1
SORE THROAT: 0
SINUS PRESSURE: 0
VOMITING: 0
DIARRHEA: 1

## 2019-01-01 ASSESSMENT — PAIN SCALES - GENERAL
PAINLEVEL_OUTOF10: 3
PAINLEVEL_OUTOF10: 0
PAINLEVEL_OUTOF10: 10
PAINLEVEL_OUTOF10: 8
PAINLEVEL_OUTOF10: 8
PAINLEVEL_OUTOF10: 0
PAINLEVEL_OUTOF10: 10
PAINLEVEL_OUTOF10: 8
PAINLEVEL_OUTOF10: 9
PAINLEVEL_OUTOF10: 10
PAINLEVEL_OUTOF10: 10
PAINLEVEL_OUTOF10: 0
PAINLEVEL_OUTOF10: 8
PAINLEVEL_OUTOF10: 10
PAINLEVEL_OUTOF10: 5
PAINLEVEL_OUTOF10: 0
PAINLEVEL_OUTOF10: 10
PAINLEVEL_OUTOF10: 8
PAINLEVEL_OUTOF10: 4
PAINLEVEL_OUTOF10: 2
PAINLEVEL_OUTOF10: 10
PAINLEVEL_OUTOF10: 10
PAINLEVEL_OUTOF10: 5
PAINLEVEL_OUTOF10: 7
PAINLEVEL_OUTOF10: 0
PAINLEVEL_OUTOF10: 8
PAINLEVEL_OUTOF10: 3
PAINLEVEL_OUTOF10: 10
PAINLEVEL_OUTOF10: 10
PAINLEVEL_OUTOF10: 8

## 2019-01-01 ASSESSMENT — PAIN DESCRIPTION - DESCRIPTORS
DESCRIPTORS: JABBING;STABBING
DESCRIPTORS: SHARP;SHOOTING
DESCRIPTORS: JABBING
DESCRIPTORS: JABBING;STABBING
DESCRIPTORS: JABBING;STABBING
DESCRIPTORS: JABBING
DESCRIPTORS: JABBING
DESCRIPTORS: ACHING;DISCOMFORT;SHARP;PRESSURE

## 2019-01-01 ASSESSMENT — PAIN DESCRIPTION - FREQUENCY
FREQUENCY: CONTINUOUS

## 2019-01-01 ASSESSMENT — PAIN SCALES - PAIN ASSESSMENT IN ADVANCED DEMENTIA (PAINAD)
BODYLANGUAGE: 2
CONSOLABILITY: 0
TOTALSCORE: 3
BREATHING: 1
NEGVOCALIZATION: 0
FACIALEXPRESSION: 0

## 2019-01-01 ASSESSMENT — PAIN DESCRIPTION - LOCATION
LOCATION: ABDOMEN

## 2019-01-01 ASSESSMENT — PAIN DESCRIPTION - PAIN TYPE
TYPE: ACUTE PAIN

## 2019-01-01 ASSESSMENT — PAIN DESCRIPTION - ONSET
ONSET: ON-GOING

## 2019-01-01 ASSESSMENT — PAIN - FUNCTIONAL ASSESSMENT
PAIN_FUNCTIONAL_ASSESSMENT: PREVENTS OR INTERFERES WITH ALL ACTIVE AND SOME PASSIVE ACTIVITIES
PAIN_FUNCTIONAL_ASSESSMENT: PREVENTS OR INTERFERES SOME ACTIVE ACTIVITIES AND ADLS
PAIN_FUNCTIONAL_ASSESSMENT: PREVENTS OR INTERFERES SOME ACTIVE ACTIVITIES AND ADLS
PAIN_FUNCTIONAL_ASSESSMENT: PREVENTS OR INTERFERES WITH ALL ACTIVE AND SOME PASSIVE ACTIVITIES

## 2019-01-30 PROBLEM — A41.9 SEPSIS (HCC): Status: ACTIVE | Noted: 2019-01-01

## 2019-01-30 PROBLEM — K55.1 CHRONIC MESENTERIC ISCHEMIA (HCC): Status: ACTIVE | Noted: 2019-01-01

## 2019-01-30 PROBLEM — K52.9 COLITIS: Status: ACTIVE | Noted: 2019-01-01

## 2019-02-09 PROBLEM — K55.9 MESENTERIC ISCHEMIA (HCC): Status: ACTIVE | Noted: 2019-01-01

## 2019-02-09 PROBLEM — R65.21 SEPTIC SHOCK (HCC): Status: ACTIVE | Noted: 2019-01-01

## 2019-02-09 PROBLEM — A41.9 SEPTIC SHOCK (HCC): Status: ACTIVE | Noted: 2019-01-01

## 2019-02-14 LAB
BLOOD CULTURE, ROUTINE: NORMAL
CULTURE, BLOOD 2: NORMAL

## 2019-02-15 LAB
EKG ATRIAL RATE: 124 BPM
EKG P AXIS: 26 DEGREES
EKG P-R INTERVAL: 128 MS
EKG Q-T INTERVAL: 344 MS
EKG QRS DURATION: 126 MS
EKG QTC CALCULATION (BAZETT): 494 MS
EKG R AXIS: 21 DEGREES
EKG T AXIS: 38 DEGREES
EKG VENTRICULAR RATE: 124 BPM